# Patient Record
Sex: MALE | Race: BLACK OR AFRICAN AMERICAN | Employment: UNEMPLOYED | URBAN - METROPOLITAN AREA
[De-identification: names, ages, dates, MRNs, and addresses within clinical notes are randomized per-mention and may not be internally consistent; named-entity substitution may affect disease eponyms.]

---

## 2019-09-16 ENCOUNTER — OP HISTORICAL/CONVERTED ENCOUNTER (OUTPATIENT)
Dept: OTHER | Age: 64
End: 2019-09-16

## 2019-09-30 ENCOUNTER — OP HISTORICAL/CONVERTED ENCOUNTER (OUTPATIENT)
Dept: OTHER | Age: 64
End: 2019-09-30

## 2019-10-07 ENCOUNTER — OP HISTORICAL/CONVERTED ENCOUNTER (OUTPATIENT)
Dept: OTHER | Age: 64
End: 2019-10-07

## 2020-02-27 ENCOUNTER — IP HISTORICAL/CONVERTED ENCOUNTER (OUTPATIENT)
Dept: OTHER | Age: 65
End: 2020-02-27

## 2020-03-13 ENCOUNTER — OP HISTORICAL/CONVERTED ENCOUNTER (OUTPATIENT)
Dept: OTHER | Age: 65
End: 2020-03-13

## 2021-01-15 ENCOUNTER — APPOINTMENT (OUTPATIENT)
Dept: GENERAL RADIOLOGY | Age: 66
DRG: 177 | End: 2021-01-15
Attending: EMERGENCY MEDICINE
Payer: MEDICARE

## 2021-01-15 ENCOUNTER — HOSPITAL ENCOUNTER (INPATIENT)
Age: 66
LOS: 11 days | Discharge: HOME HEALTH | DRG: 177 | End: 2021-01-26
Attending: FAMILY MEDICINE | Admitting: FAMILY MEDICINE
Payer: MEDICARE

## 2021-01-15 DIAGNOSIS — J12.82 PNEUMONIA DUE TO COVID-19 VIRUS: ICD-10-CM

## 2021-01-15 DIAGNOSIS — U07.1 PNEUMONIA DUE TO COVID-19 VIRUS: ICD-10-CM

## 2021-01-15 DIAGNOSIS — R53.1 GENERALIZED WEAKNESS: ICD-10-CM

## 2021-01-15 DIAGNOSIS — J96.01 ACUTE RESPIRATORY FAILURE WITH HYPOXIA (HCC): Primary | ICD-10-CM

## 2021-01-15 PROBLEM — J18.9 BILATERAL PNEUMONIA: Status: ACTIVE | Noted: 2021-01-15

## 2021-01-15 LAB
ALBUMIN SERPL-MCNC: 2.8 G/DL (ref 3.5–5)
ALBUMIN/GLOB SERPL: 0.5 {RATIO} (ref 1.1–2.2)
ALP SERPL-CCNC: 61 U/L (ref 45–117)
ALT SERPL-CCNC: 27 U/L (ref 12–78)
ANION GAP SERPL CALC-SCNC: 14 MMOL/L (ref 5–15)
APTT PPP: 34.2 SEC (ref 23–35.7)
AST SERPL W P-5'-P-CCNC: 28 U/L (ref 15–37)
BASOPHILS # BLD: 0 K/UL (ref 0–0.1)
BASOPHILS NFR BLD: 0 % (ref 0–1)
BILIRUB SERPL-MCNC: 0.4 MG/DL (ref 0.2–1)
BNP SERPL-MCNC: 605 PG/ML
BUN SERPL-MCNC: 102 MG/DL (ref 6–20)
BUN/CREAT SERPL: 19 (ref 12–20)
CA-I BLD-MCNC: 8.8 MG/DL (ref 8.5–10.1)
CHLORIDE SERPL-SCNC: 98 MMOL/L (ref 97–108)
CO2 SERPL-SCNC: 20 MMOL/L (ref 21–32)
CREAT SERPL-MCNC: 5.37 MG/DL (ref 0.7–1.3)
CRP SERPL-MCNC: 13.5 MG/DL (ref 0–0.6)
D DIMER PPP FEU-MCNC: 2.69 UG/ML(FEU)
DIFFERENTIAL METHOD BLD: ABNORMAL
EOSINOPHIL # BLD: 0 K/UL (ref 0–0.4)
EOSINOPHIL NFR BLD: 0 % (ref 0–7)
ERYTHROCYTE [DISTWIDTH] IN BLOOD BY AUTOMATED COUNT: 15.2 % (ref 11.5–14.5)
GLOBULIN SER CALC-MCNC: 5.2 G/DL (ref 2–4)
GLUCOSE SERPL-MCNC: 99 MG/DL (ref 65–100)
HCT VFR BLD AUTO: 31.1 % (ref 36.6–50.3)
HGB BLD-MCNC: 10.5 G/DL (ref 12.1–17)
IMM GRANULOCYTES # BLD AUTO: 0 K/UL (ref 0–0.04)
IMM GRANULOCYTES NFR BLD AUTO: 0 % (ref 0–0.5)
INR PPP: 1.3 (ref 0.9–1.1)
LACTATE SERPL-SCNC: 1.2 MMOL/L (ref 0.4–2)
LDH SERPL L TO P-CCNC: 367 U/L (ref 85–241)
LYMPHOCYTES # BLD: 0.4 K/UL (ref 0.8–3.5)
LYMPHOCYTES NFR BLD: 6 % (ref 12–49)
MAGNESIUM SERPL-MCNC: 2.8 MG/DL (ref 1.6–2.4)
MCH RBC QN AUTO: 26.3 PG (ref 26–34)
MCHC RBC AUTO-ENTMCNC: 33.8 G/DL (ref 30–36.5)
MCV RBC AUTO: 77.8 FL (ref 80–99)
MONOCYTES # BLD: 0.2 K/UL (ref 0–1)
MONOCYTES NFR BLD: 3 % (ref 5–13)
NEUTS SEG # BLD: 5.9 K/UL (ref 1.8–8)
NEUTS SEG NFR BLD: 91 % (ref 32–75)
PLATELET # BLD AUTO: 281 K/UL (ref 150–400)
PMV BLD AUTO: 10.1 FL (ref 8.9–12.9)
POTASSIUM SERPL-SCNC: 3.8 MMOL/L (ref 3.5–5.1)
PROCALCITONIN SERPL-MCNC: 0.28 NG/ML
PROT SERPL-MCNC: 8 G/DL (ref 6.4–8.2)
PROTHROMBIN TIME: 15.7 SEC (ref 11.9–14.7)
RBC # BLD AUTO: 4 M/UL (ref 4.1–5.7)
SODIUM SERPL-SCNC: 132 MMOL/L (ref 136–145)
THERAPEUTIC RANGE,PTTT: NORMAL SEC (ref 68–109)
TROPONIN I SERPL-MCNC: <0.05 NG/ML
TSH SERPL DL<=0.05 MIU/L-ACNC: 1.08 UIU/ML (ref 0.36–3.74)
WBC # BLD AUTO: 6.5 K/UL (ref 4.1–11.1)

## 2021-01-15 PROCEDURE — 84145 PROCALCITONIN (PCT): CPT

## 2021-01-15 PROCEDURE — 85025 COMPLETE CBC W/AUTO DIFF WBC: CPT

## 2021-01-15 PROCEDURE — 96374 THER/PROPH/DIAG INJ IV PUSH: CPT

## 2021-01-15 PROCEDURE — 74011000250 HC RX REV CODE- 250: Performed by: FAMILY MEDICINE

## 2021-01-15 PROCEDURE — 36415 COLL VENOUS BLD VENIPUNCTURE: CPT

## 2021-01-15 PROCEDURE — 83615 LACTATE (LD) (LDH) ENZYME: CPT

## 2021-01-15 PROCEDURE — 83735 ASSAY OF MAGNESIUM: CPT

## 2021-01-15 PROCEDURE — 84484 ASSAY OF TROPONIN QUANT: CPT

## 2021-01-15 PROCEDURE — 82728 ASSAY OF FERRITIN: CPT

## 2021-01-15 PROCEDURE — 86140 C-REACTIVE PROTEIN: CPT

## 2021-01-15 PROCEDURE — 83605 ASSAY OF LACTIC ACID: CPT

## 2021-01-15 PROCEDURE — 65270000029 HC RM PRIVATE

## 2021-01-15 PROCEDURE — 83880 ASSAY OF NATRIURETIC PEPTIDE: CPT

## 2021-01-15 PROCEDURE — 93005 ELECTROCARDIOGRAM TRACING: CPT

## 2021-01-15 PROCEDURE — 85610 PROTHROMBIN TIME: CPT

## 2021-01-15 PROCEDURE — 84443 ASSAY THYROID STIM HORMONE: CPT

## 2021-01-15 PROCEDURE — 74011250636 HC RX REV CODE- 250/636: Performed by: FAMILY MEDICINE

## 2021-01-15 PROCEDURE — 87040 BLOOD CULTURE FOR BACTERIA: CPT

## 2021-01-15 PROCEDURE — 80053 COMPREHEN METABOLIC PANEL: CPT

## 2021-01-15 PROCEDURE — 99285 EMERGENCY DEPT VISIT HI MDM: CPT

## 2021-01-15 PROCEDURE — 71045 X-RAY EXAM CHEST 1 VIEW: CPT

## 2021-01-15 PROCEDURE — 85379 FIBRIN DEGRADATION QUANT: CPT

## 2021-01-15 PROCEDURE — 85730 THROMBOPLASTIN TIME PARTIAL: CPT

## 2021-01-15 PROCEDURE — 74011250636 HC RX REV CODE- 250/636: Performed by: PHYSICIAN ASSISTANT

## 2021-01-15 RX ORDER — ALBUTEROL SULFATE 90 UG/1
2 AEROSOL, METERED RESPIRATORY (INHALATION) ONCE
Status: DISPENSED | OUTPATIENT
Start: 2021-01-15 | End: 2021-01-16

## 2021-01-15 RX ORDER — DEXAMETHASONE SODIUM PHOSPHATE 4 MG/ML
6 INJECTION, SOLUTION INTRA-ARTICULAR; INTRALESIONAL; INTRAMUSCULAR; INTRAVENOUS; SOFT TISSUE ONCE
Status: COMPLETED | OUTPATIENT
Start: 2021-01-16 | End: 2021-01-16

## 2021-01-15 RX ORDER — MAGNESIUM SULFATE 100 %
4 CRYSTALS MISCELLANEOUS AS NEEDED
Status: DISCONTINUED | OUTPATIENT
Start: 2021-01-15 | End: 2021-01-26 | Stop reason: HOSPADM

## 2021-01-15 RX ORDER — SODIUM CHLORIDE 0.9 % (FLUSH) 0.9 %
5-40 SYRINGE (ML) INJECTION AS NEEDED
Status: DISCONTINUED | OUTPATIENT
Start: 2021-01-15 | End: 2021-01-21

## 2021-01-15 RX ORDER — DEXTROSE 50 % IN WATER (D50W) INTRAVENOUS SYRINGE
25-50 AS NEEDED
Status: DISCONTINUED | OUTPATIENT
Start: 2021-01-15 | End: 2021-01-26 | Stop reason: HOSPADM

## 2021-01-15 RX ORDER — DEXAMETHASONE SODIUM PHOSPHATE 10 MG/ML
6 INJECTION INTRAMUSCULAR; INTRAVENOUS ONCE
Status: COMPLETED | OUTPATIENT
Start: 2021-01-15 | End: 2021-01-15

## 2021-01-15 RX ORDER — HYDRALAZINE HYDROCHLORIDE 25 MG/1
25 TABLET, FILM COATED ORAL
Status: ACTIVE | OUTPATIENT
Start: 2021-01-15 | End: 2021-01-16

## 2021-01-15 RX ORDER — SODIUM CHLORIDE 0.9 % (FLUSH) 0.9 %
5-40 SYRINGE (ML) INJECTION EVERY 8 HOURS
Status: DISCONTINUED | OUTPATIENT
Start: 2021-01-15 | End: 2021-01-21

## 2021-01-15 RX ORDER — ONDANSETRON 2 MG/ML
4 INJECTION INTRAMUSCULAR; INTRAVENOUS
Status: DISCONTINUED | OUTPATIENT
Start: 2021-01-15 | End: 2021-01-21

## 2021-01-15 RX ORDER — ACETAMINOPHEN 650 MG/1
650 SUPPOSITORY RECTAL
Status: DISCONTINUED | OUTPATIENT
Start: 2021-01-15 | End: 2021-01-26 | Stop reason: HOSPADM

## 2021-01-15 RX ORDER — POLYETHYLENE GLYCOL 3350 17 G/17G
17 POWDER, FOR SOLUTION ORAL DAILY PRN
Status: DISCONTINUED | OUTPATIENT
Start: 2021-01-15 | End: 2021-01-26 | Stop reason: HOSPADM

## 2021-01-15 RX ORDER — HEPARIN SODIUM 5000 [USP'U]/ML
5000 INJECTION, SOLUTION INTRAVENOUS; SUBCUTANEOUS EVERY 8 HOURS
Status: DISCONTINUED | OUTPATIENT
Start: 2021-01-15 | End: 2021-01-26 | Stop reason: HOSPADM

## 2021-01-15 RX ORDER — ACETAMINOPHEN 325 MG/1
650 TABLET ORAL
Status: DISCONTINUED | OUTPATIENT
Start: 2021-01-15 | End: 2021-01-26 | Stop reason: HOSPADM

## 2021-01-15 RX ORDER — INSULIN LISPRO 100 [IU]/ML
INJECTION, SOLUTION INTRAVENOUS; SUBCUTANEOUS
Status: DISCONTINUED | OUTPATIENT
Start: 2021-01-16 | End: 2021-01-26 | Stop reason: HOSPADM

## 2021-01-15 RX ORDER — PROMETHAZINE HYDROCHLORIDE 25 MG/1
12.5 TABLET ORAL
Status: DISCONTINUED | OUTPATIENT
Start: 2021-01-15 | End: 2021-01-26 | Stop reason: HOSPADM

## 2021-01-15 RX ADMIN — AZITHROMYCIN DIHYDRATE 500 MG: 500 INJECTION, POWDER, LYOPHILIZED, FOR SOLUTION INTRAVENOUS at 21:27

## 2021-01-15 RX ADMIN — SODIUM CHLORIDE 1000 ML: 9 INJECTION, SOLUTION INTRAVENOUS at 18:30

## 2021-01-15 RX ADMIN — CEFTRIAXONE SODIUM 1 G: 1 INJECTION, POWDER, FOR SOLUTION INTRAMUSCULAR; INTRAVENOUS at 21:27

## 2021-01-15 RX ADMIN — HEPARIN SODIUM 5000 UNITS: 5000 INJECTION INTRAVENOUS; SUBCUTANEOUS at 21:27

## 2021-01-15 RX ADMIN — DEXAMETHASONE SODIUM PHOSPHATE 6 MG: 10 INJECTION, SOLUTION INTRAMUSCULAR; INTRAVENOUS at 18:30

## 2021-01-15 NOTE — ED NOTES
Pts niece would like to be contacted with updates. Patient Brent pace with this. [phone] Mike Reynoso.

## 2021-01-15 NOTE — ED TRIAGE NOTES
[facility] pt went to [facility] c/o cough for a week and weakness for 2-3 day, a rapid COVID was done and pt tested positive; upon EMS arrival pt's o2 sats were 92 on RA, after placing pt o 2 l/min pt;s O2 sats went up to 98%

## 2021-01-15 NOTE — ED PROVIDER NOTES
EMERGENCY DEPARTMENT HISTORY AND PHYSICAL EXAM      Date: 1/15/2021  Patient Name: Wong Chilel    History of Presenting Illness     Chief Complaint   Patient presents with    Positive For Covid-19       History Provided By: Patient and EMS    HPI: Wong Chilel, 77 y.o. male with a past medical history significant hypertension and CKD4, prostate cancer presents to the ED with cc of shortness of breath and generalized weakness, worsening over the last week. Patient tested positive for COVID-19 a few days ago. He has not been eating or drinking well over the last few days. Patient is not a cigarette smoker, no history of COPD or asthma. He does not wear oxygen at home. Upon EMS arrival, patient was hypoxic at 92%. Oxygen saturation increased to 98% after 2 L via nasal cannula. Current associated symptoms include mild diarrhea. Patient specifically denies chest pain, abdominal pain, nausea, vomiting, recent travel, leg swelling, syncope, palpitations, wheezing. There are no other complaints, changes, or physical findings at this time.     PCP: Wei Palmer,     Current Facility-Administered Medications   Medication Dose Route Frequency Provider Last Rate Last Admin    albuterol (PROVENTIL HFA, VENTOLIN HFA, PROAIR HFA) inhaler 2 Puff  2 Puff Inhalation ONCE Pita Mckoy PA-C   Stopped at 01/15/21 2044    sodium chloride (NS) flush 5-40 mL  5-40 mL IntraVENous Q8H Benjamín Sarabia MD        sodium chloride (NS) flush 5-40 mL  5-40 mL IntraVENous PRN Benjamín Sarabia MD        acetaminophen (TYLENOL) tablet 650 mg  650 mg Oral Q6H PRN Bnejamín Sarabia MD        Or    acetaminophen (TYLENOL) suppository 650 mg  650 mg Rectal Q6H PRN Benjamín Sarabia MD        polyethylene glycol (MIRALAX) packet 17 g  17 g Oral DAILY PRN Benjamín Sarabia MD        promethazine (PHENERGAN) tablet 12.5 mg  12.5 mg Oral Q6H PRN Benjamín Sarabia MD        Or    ondansetron [facility]) injection 4 mg  4 mg IntraVENous Q6H PRN Jolanta Dyer MD        heparin (porcine) injection 5,000 Units  5,000 Units SubCUTAneous Q8H Jolanta Dyer MD   5,000 Units at 01/15/21 2127    azithromycin (ZITHROMAX) 500 mg in 0.9% sodium chloride 250 mL (VIAL-MATE)  500 mg IntraVENous Q24H Jolanta Dyer MD   500 mg at 01/15/21 2127    cefTRIAXone (ROCEPHIN) 1 g in sterile water (preservative free) 10 mL IV syringe  1 g IntraVENous Q24H Jolanta Dyer MD   1 g at 01/15/21 2127       Past History     Past Medical History:  Past Medical History:   Diagnosis Date    CKD (chronic kidney disease) stage 4, GFR 15-29 ml/min ([facility])     DM (diabetes mellitus) [facility] [address]     HTN (hypertension)        Past Surgical History:  History reviewed. No pertinent surgical history. Family History:  Family History   Problem Relation Age of Onset    Coronary Artery Disease Mother     Heart Failure Mother     Pacemaker Sister        Social History:  Social History     Tobacco Use    Smoking status: Never Smoker    Smokeless tobacco: Never Used   Substance Use Topics    Alcohol use: Not Currently    Drug use: Not Currently       Allergies:  No Known Allergies      Review of Systems   Review of Systems   Constitutional: Positive for activity change, appetite change and fatigue. Negative for chills and fever. HENT: Negative for congestion, ear pain, rhinorrhea and trouble swallowing. Eyes: Negative for pain and visual disturbance. Respiratory: Positive for cough and shortness of breath. Negative for wheezing. Cardiovascular: Negative for chest pain. Gastrointestinal: Positive for diarrhea. Negative for abdominal pain, nausea and vomiting. Genitourinary: Negative for decreased urine volume, difficulty urinating, dysuria and hematuria. Musculoskeletal: Negative for arthralgias and myalgias. Skin: Negative for rash. Neurological: Positive for weakness. Negative for headaches. Hematological: Negative for adenopathy. Psychiatric/Behavioral: The patient is not nervous/anxious. All other systems reviewed and are negative. Physical Exam   Physical Exam  Vitals signs and nursing note reviewed. Constitutional:       General: He is not in acute distress. Appearance: He is well-developed. He is ill-appearing. HENT:      Head: Normocephalic and atraumatic. Mouth/Throat:      Mouth: Mucous membranes are moist.   Eyes:      Extraocular Movements: Extraocular movements intact. Pupils: Pupils are equal, round, and reactive to light. Cardiovascular:      Rate and Rhythm: Normal rate and regular rhythm. Pulses: Normal pulses. Heart sounds: Normal heart sounds. Pulmonary:      Effort: Pulmonary effort is normal. No tachypnea or respiratory distress. Breath sounds: Decreased breath sounds present. No wheezing, rhonchi or rales. Abdominal:      General: Abdomen is flat. Bowel sounds are normal.      Palpations: Abdomen is soft. Tenderness: There is no abdominal tenderness. Musculoskeletal:      Right lower leg: No edema. Left lower leg: No edema. Skin:     General: Skin is warm and dry. Capillary Refill: Capillary refill takes less than 2 seconds. Findings: No rash. Neurological:      General: No focal deficit present. Mental Status: He is alert and oriented to person, place, and time. Cranial Nerves: No cranial nerve deficit. Motor: Motor function is intact.    Psychiatric:         Mood and Affect: Mood normal.         Behavior: Behavior normal.         Diagnostic Study Results     Labs -     Recent Results (from the past 48 hour(s))   CBC WITH AUTOMATED DIFF    Collection Time: 01/15/21  5:15 PM   Result Value Ref Range    WBC 6.5 4.1 - 11.1 K/uL    RBC 4.00 (L) 4.10 - 5.70 M/uL    HGB 10.5 (L) 12.1 - 17.0 g/dL    HCT 31.1 (L) 36.6 - 50.3 %    MCV 77.8 (L) 80.0 - 99.0 FL    MCH 26.3 26.0 - 34.0 PG    MCHC 33.8 30.0 - 36.5 g/dL    RDW 15.2 (H) 11.5 - 14.5 % PLATELET 218 896 - 044 K/uL    MPV 10.1 8.9 - 12.9 FL    NEUTROPHILS 91 (H) 32 - 75 %    LYMPHOCYTES 6 (L) 12 - 49 %    MONOCYTES 3 (L) 5 - 13 %    EOSINOPHILS 0 0 - 7 %    BASOPHILS 0 0 - 1 %    IMMATURE GRANULOCYTES 0 0.0 - 0.5 %    ABS. NEUTROPHILS 5.9 1.8 - 8.0 K/UL    ABS. LYMPHOCYTES 0.4 (L) 0.8 - 3.5 K/UL    ABS. MONOCYTES 0.2 0.0 - 1.0 K/UL    ABS. EOSINOPHILS 0.0 0.0 - 0.4 K/UL    ABS. BASOPHILS 0.0 0.0 - 0.1 K/UL    ABS. IMM. GRANS. 0.0 0.00 - 0.04 K/UL    DF AUTOMATED     METABOLIC PANEL, COMPREHENSIVE    Collection Time: 01/15/21  5:15 PM   Result Value Ref Range    Sodium 132 (L) 136 - 145 mmol/L    Potassium 3.8 3.5 - 5.1 mmol/L    Chloride 98 97 - 108 mmol/L    CO2 20 (L) 21 - 32 mmol/L    Anion gap 14 5 - 15 mmol/L    Glucose 99 65 - 100 mg/dL     (H) 6 - 20 mg/dL    Creatinine 5.37 (H) 0.70 - 1.30 mg/dL    BUN/Creatinine ratio 19 12 - 20      GFR est AA 13 (L) >60 ml/min/1.73m2    GFR est non-AA 11 (L) >60 ml/min/1.73m2    Calcium 8.8 8.5 - 10.1 mg/dL    Bilirubin, total 0.4 0.2 - 1.0 mg/dL    AST (SGOT) 28 15 - 37 U/L    ALT (SGPT) 27 12 - 78 U/L    Alk.  phosphatase 61 45 - 117 U/L    Protein, total 8.0 6.4 - 8.2 g/dL    Albumin 2.8 (L) 3.5 - 5.0 g/dL    Globulin 5.2 (H) 2.0 - 4.0 g/dL    A-G Ratio 0.5 (L) 1.1 - 2.2     TROPONIN I    Collection Time: 01/15/21  5:30 PM   Result Value Ref Range    Troponin-I, Qt. <0.05 <0.05 ng/mL   BNP    Collection Time: 01/15/21  5:30 PM   Result Value Ref Range    NT pro- (H) <125 pg/mL   MAGNESIUM    Collection Time: 01/15/21  5:30 PM   Result Value Ref Range    Magnesium 2.8 (H) 1.6 - 2.4 mg/dL   TSH 3RD GENERATION    Collection Time: 01/15/21  5:30 PM   Result Value Ref Range    TSH 1.08 0.36 - 3.74 uIU/mL   LD    Collection Time: 01/15/21  5:30 PM   Result Value Ref Range     (H) 85 - 241 U/L   C REACTIVE PROTEIN, QT    Collection Time: 01/15/21  5:30 PM   Result Value Ref Range    C-Reactive protein 13.50 (H) 0.00 - 0.60 mg/dL   D DIMER    Collection Time: 01/15/21  5:30 PM   Result Value Ref Range    D DIMER 2.69 (H) <0.50 ug/ml(FEU)   LACTIC ACID    Collection Time: 01/15/21  5:30 PM   Result Value Ref Range    Lactic acid 1.2 0.4 - 2.0 mmol/L   PROCALCITONIN    Collection Time: 01/15/21  5:30 PM   Result Value Ref Range    Procalcitonin 0.28 (H) 0 ng/mL   PROTHROMBIN TIME + INR    Collection Time: 01/15/21  5:30 PM   Result Value Ref Range    Prothrombin time 15.7 (H) 11.9 - 14.7 sec    INR 1.3 (H) 0.9 - 1.1     PTT    Collection Time: 01/15/21  5:30 PM   Result Value Ref Range    aPTT 34.2 23.0 - 35.7 sec    aPTT, therapeutic range   68 - 109 sec       Radiologic Studies -   XR Results (most recent):  Results from Hospital Encounter encounter on 01/15/21   XR CHEST SNGL V    Narrative Chest, frontal view, 1/15/2021    History: Covid-19 positive. Comparison: Including chest 11/26/2015. Findings: There is surgical hardware in the cervical spine. The cardiac  silhouette is within normal limits. The lungs are underexpanded. There are  airspace opacities have been the lungs most pronounced in the perihilar regions  and bases compatible with Covid-19 pneumonia. Hydrostatic edema could also be  present. No pneumothorax is identified. Degenerative changes are present in  the thoracic spine. Impression Impression: Airspace opacities in the lungs compatible with Covid-19 pneumonia. CT Results  (Last 48 hours)    None            Medical Decision Making and ED Course   I am the first provider for this patient. I reviewed the vital signs, available nursing notes, past medical history, past surgical history, family history and social history. Vital Signs-Reviewed the patient's vital signs.   Patient Vitals for the past 12 hrs:   Temp Pulse Resp BP SpO2   01/15/21 2124     97 %   01/15/21 2123     (!) 87 %   01/15/21 2033  75 24 (!) 145/86 95 %   01/15/21 1758  83 27 136/88 96 %   01/15/21 1649 98.3 °F (36.8 °C) 85 16 (!) 148/89 98 %       EKG interpretation: (Preliminary)  Normal sinus rhythm at 78 bpm, no ST-T wave changes, read by Dr. Lila Crane at 2105    Records Reviewed: Nursing Notes, Old Medical Records, Previous Radiology Studies and Previous Laboratory Studies    Provider Notes (Medical Decision Making):       MDM  Number of Diagnoses or Management Options  Acute respiratory failure with hypoxia ([facility] [address]  Generalized weakness  Pneumonia due to COVID-19 virus  Diagnosis management comments: Differentials include COVID-19 pneumonia, acute respiratory failure secondary to COVID-19, pleural effusion, pulmonary edema, congestive heart failure, electrolyte disturbance, dehydration    This is a 78-year-old male with a history of diabetes and hypertension, recently tested positive for COVID-19. Presenting with hypoxia via EMS. Attempted to wean patient off of oxygen. He dropped down to 86% on room air without exertion. Will admit for oxygen therapy and further observation. Amount and/or Complexity of Data Reviewed  Clinical lab tests: ordered and reviewed  Tests in the radiology section of CPT®: ordered and reviewed          ED Course:   Initial assessment performed. The patients presenting problems have been discussed, and they are in agreement with the care plan formulated and outlined with them. I have encouraged them to ask questions as they arise throughout their visit. 9:11 PM  Progress Note:  Patient was re-evaluated at bedside. Saturating 86% on room air. Consult Note:  9:11 PM  Orlando Everett PA-C spoke with Dr. Tanvi Shafer  Specialty: Internal Medicine  Discussed pt's hx, disposition, and available diagnostic and imaging results. Reviewed care plans. Dr. Tanvi Shafer will evaluate the patient for admission. Admit Note:  9:11 PM  Pt is being admitted by Dr. Tanvi Shafer. The results of their tests and reason(s) for their admission have been discussed with pt and/or available family.  They convey agreement and understanding for the need to be admitted and for admission diagnosis. Procedures       Rashawn Lozada PA-C    Procedures       CRITICAL CARE NOTE :  6:22 PM  Amount of Critical Care Time: _30___(minutes)__    IMPENDING DETERIORATION -Airway and Respiratory  ASSOCIATED RISK FACTORS - Hypoxia  MANAGEMENT- Bedside Assessment and Supervision of Care  INTERPRETATION -  Xrays, ECG and Blood Pressure  INTERVENTIONS - Supplemental oxygen  CASE REVIEW - Hospitalist and Nursing  TREATMENT RESPONSE -Improved and Stable  PERFORMED BY - Mid-Level    NOTES   :  I have spent critical care time involved in lab review, consultations with specialist, family decision- making, bedside attention and documentation. This time excludes time spent in any separate billed procedures. During this entire length of time I was immediately available to the patient . Rashawn Lozada PA-C        Disposition     Admitted         Diagnosis     Clinical Impression:   1. Acute respiratory failure with hypoxia ([address]    2. Pneumonia due to COVID-19 virus    3. Generalized weakness        Attestations:    Rashawn Lozada PA-C    Please note that this dictation was completed with [employer], the computer voice recognition software. Quite often unanticipated grammatical, syntax, homophones, and other interpretive errors are inadvertently transcribed by the computer software. Please disregard these errors. Please excuse any errors that have escaped final proofreading. Thank you.

## 2021-01-16 LAB
ANION GAP SERPL CALC-SCNC: 14 MMOL/L (ref 5–15)
BUN SERPL-MCNC: 101 MG/DL (ref 6–20)
BUN/CREAT SERPL: 20 (ref 12–20)
CA-I BLD-MCNC: 8.3 MG/DL (ref 8.5–10.1)
CHLORIDE SERPL-SCNC: 102 MMOL/L (ref 97–108)
CO2 SERPL-SCNC: 18 MMOL/L (ref 21–32)
COVID-19 RAPID TEST, COVR: DETECTED
CREAT SERPL-MCNC: 5 MG/DL (ref 0.7–1.3)
ERYTHROCYTE [DISTWIDTH] IN BLOOD BY AUTOMATED COUNT: 15.3 % (ref 11.5–14.5)
FERRITIN SERPL-MCNC: 350 NG/ML (ref 26–388)
GLUCOSE BLD STRIP.AUTO-MCNC: 145 MG/DL (ref 65–100)
GLUCOSE BLD STRIP.AUTO-MCNC: 149 MG/DL (ref 65–100)
GLUCOSE BLD STRIP.AUTO-MCNC: 177 MG/DL (ref 65–100)
GLUCOSE SERPL-MCNC: 157 MG/DL (ref 65–100)
HCT VFR BLD AUTO: 27.3 % (ref 36.6–50.3)
HGB BLD-MCNC: 9.8 G/DL (ref 12.1–17)
MAGNESIUM SERPL-MCNC: 2.8 MG/DL (ref 1.6–2.4)
MCH RBC QN AUTO: 28 PG (ref 26–34)
MCHC RBC AUTO-ENTMCNC: 35.9 G/DL (ref 30–36.5)
MCV RBC AUTO: 78 FL (ref 80–99)
PERFORMED BY, TECHID: ABNORMAL
PLATELET # BLD AUTO: 264 K/UL (ref 150–400)
PMV BLD AUTO: 10.2 FL (ref 8.9–12.9)
POTASSIUM SERPL-SCNC: 3.3 MMOL/L (ref 3.5–5.1)
RBC # BLD AUTO: 3.5 M/UL (ref 4.1–5.7)
SARS-COV-2, COV2: NORMAL
SODIUM SERPL-SCNC: 134 MMOL/L (ref 136–145)
SPECIMEN SOURCE: ABNORMAL
WBC # BLD AUTO: 5.2 K/UL (ref 4.1–11.1)

## 2021-01-16 PROCEDURE — 94760 N-INVAS EAR/PLS OXIMETRY 1: CPT

## 2021-01-16 PROCEDURE — 80048 BASIC METABOLIC PNL TOTAL CA: CPT

## 2021-01-16 PROCEDURE — 74011250636 HC RX REV CODE- 250/636: Performed by: PHYSICIAN ASSISTANT

## 2021-01-16 PROCEDURE — 65270000029 HC RM PRIVATE

## 2021-01-16 PROCEDURE — 74011250637 HC RX REV CODE- 250/637: Performed by: PHYSICIAN ASSISTANT

## 2021-01-16 PROCEDURE — 87635 SARS-COV-2 COVID-19 AMP PRB: CPT

## 2021-01-16 PROCEDURE — 82962 GLUCOSE BLOOD TEST: CPT

## 2021-01-16 PROCEDURE — 74011000250 HC RX REV CODE- 250: Performed by: FAMILY MEDICINE

## 2021-01-16 PROCEDURE — 83735 ASSAY OF MAGNESIUM: CPT

## 2021-01-16 PROCEDURE — 36415 COLL VENOUS BLD VENIPUNCTURE: CPT

## 2021-01-16 PROCEDURE — 85027 COMPLETE CBC AUTOMATED: CPT

## 2021-01-16 PROCEDURE — 74011250636 HC RX REV CODE- 250/636: Performed by: FAMILY MEDICINE

## 2021-01-16 RX ORDER — PANTOPRAZOLE SODIUM 40 MG/1
40 TABLET, DELAYED RELEASE ORAL
Status: DISCONTINUED | OUTPATIENT
Start: 2021-01-16 | End: 2021-01-16

## 2021-01-16 RX ORDER — LEVOTHYROXINE SODIUM 25 UG/1
50 TABLET ORAL
Status: DISCONTINUED | OUTPATIENT
Start: 2021-01-17 | End: 2021-01-26 | Stop reason: HOSPADM

## 2021-01-16 RX ORDER — IVERMECTIN 3 MG/1
12 TABLET ORAL ONCE
Status: DISPENSED | OUTPATIENT
Start: 2021-01-16 | End: 2021-01-16

## 2021-01-16 RX ORDER — POTASSIUM CHLORIDE 750 MG/1
40 TABLET, FILM COATED, EXTENDED RELEASE ORAL ONCE
Status: COMPLETED | OUTPATIENT
Start: 2021-01-16 | End: 2021-01-16

## 2021-01-16 RX ORDER — ZINC SULFATE 50(220)MG
1 CAPSULE ORAL DAILY
Status: DISCONTINUED | OUTPATIENT
Start: 2021-01-16 | End: 2021-01-24

## 2021-01-16 RX ORDER — CALCITRIOL 0.25 UG/1
0.25 CAPSULE ORAL DAILY
COMMUNITY

## 2021-01-16 RX ORDER — HYDRALAZINE HYDROCHLORIDE 50 MG/1
100 TABLET, FILM COATED ORAL 3 TIMES DAILY
COMMUNITY

## 2021-01-16 RX ORDER — FAMOTIDINE 20 MG/1
20 TABLET, FILM COATED ORAL 2 TIMES DAILY
COMMUNITY

## 2021-01-16 RX ORDER — ALLOPURINOL 100 MG/1
100 TABLET ORAL DAILY
Status: DISCONTINUED | OUTPATIENT
Start: 2021-01-16 | End: 2021-01-26 | Stop reason: HOSPADM

## 2021-01-16 RX ORDER — FUROSEMIDE 40 MG/1
40 TABLET ORAL DAILY
COMMUNITY
End: 2021-01-26

## 2021-01-16 RX ORDER — CALCITRIOL 0.25 UG/1
0.25 CAPSULE ORAL DAILY
Status: DISCONTINUED | OUTPATIENT
Start: 2021-01-16 | End: 2021-01-26 | Stop reason: HOSPADM

## 2021-01-16 RX ORDER — ASCORBIC ACID 500 MG
500 TABLET ORAL DAILY
Status: DISCONTINUED | OUTPATIENT
Start: 2021-01-16 | End: 2021-01-24

## 2021-01-16 RX ORDER — LEVOTHYROXINE SODIUM 50 UG/1
50 TABLET ORAL
COMMUNITY

## 2021-01-16 RX ORDER — FAMOTIDINE 20 MG/1
20 TABLET, FILM COATED ORAL 2 TIMES DAILY
Status: DISCONTINUED | OUTPATIENT
Start: 2021-01-16 | End: 2021-01-26 | Stop reason: HOSPADM

## 2021-01-16 RX ORDER — CHOLECALCIFEROL TAB 125 MCG (5000 UNIT) 125 MCG
5000 TAB ORAL DAILY
Status: DISCONTINUED | OUTPATIENT
Start: 2021-01-16 | End: 2021-01-26 | Stop reason: HOSPADM

## 2021-01-16 RX ORDER — DEXAMETHASONE SODIUM PHOSPHATE 4 MG/ML
4 INJECTION, SOLUTION INTRA-ARTICULAR; INTRALESIONAL; INTRAMUSCULAR; INTRAVENOUS; SOFT TISSUE EVERY 8 HOURS
Status: DISCONTINUED | OUTPATIENT
Start: 2021-01-16 | End: 2021-01-20

## 2021-01-16 RX ORDER — AMLODIPINE BESYLATE 5 MG/1
10 TABLET ORAL DAILY
Status: DISCONTINUED | OUTPATIENT
Start: 2021-01-16 | End: 2021-01-26 | Stop reason: HOSPADM

## 2021-01-16 RX ORDER — ALLOPURINOL 100 MG/1
100 TABLET ORAL DAILY
COMMUNITY

## 2021-01-16 RX ORDER — HYDRALAZINE HYDROCHLORIDE 50 MG/1
100 TABLET, FILM COATED ORAL 3 TIMES DAILY
Status: DISCONTINUED | OUTPATIENT
Start: 2021-01-16 | End: 2021-01-26 | Stop reason: HOSPADM

## 2021-01-16 RX ORDER — ATENOLOL 100 MG/1
50 TABLET ORAL DAILY
COMMUNITY

## 2021-01-16 RX ORDER — AMLODIPINE BESYLATE 5 MG/1
5 TABLET ORAL DAILY
Status: DISCONTINUED | OUTPATIENT
Start: 2021-01-16 | End: 2021-01-16

## 2021-01-16 RX ORDER — ATENOLOL 50 MG/1
50 TABLET ORAL DAILY
Status: DISCONTINUED | OUTPATIENT
Start: 2021-01-16 | End: 2021-01-24

## 2021-01-16 RX ORDER — AMLODIPINE BESYLATE 10 MG/1
10 TABLET ORAL DAILY
COMMUNITY

## 2021-01-16 RX ADMIN — Medication 1 CAPSULE: at 09:02

## 2021-01-16 RX ADMIN — PANTOPRAZOLE SODIUM 40 MG: 40 TABLET, DELAYED RELEASE ORAL at 09:02

## 2021-01-16 RX ADMIN — FAMOTIDINE 20 MG: 20 TABLET, FILM COATED ORAL at 20:33

## 2021-01-16 RX ADMIN — ALLOPURINOL 100 MG: 100 TABLET ORAL at 11:27

## 2021-01-16 RX ADMIN — CHOLECALCIFEROL TAB 125 MCG (5000 UNIT) 5000 UNITS: 125 TAB at 09:02

## 2021-01-16 RX ADMIN — CEFTRIAXONE SODIUM 1 G: 1 INJECTION, POWDER, FOR SOLUTION INTRAMUSCULAR; INTRAVENOUS at 20:32

## 2021-01-16 RX ADMIN — POTASSIUM CHLORIDE 40 MEQ: 750 TABLET, FILM COATED, EXTENDED RELEASE ORAL at 09:02

## 2021-01-16 RX ADMIN — OXYCODONE HYDROCHLORIDE AND ACETAMINOPHEN 500 MG: 500 TABLET ORAL at 09:02

## 2021-01-16 RX ADMIN — HYDRALAZINE HYDROCHLORIDE 100 MG: 50 TABLET, FILM COATED ORAL at 20:33

## 2021-01-16 RX ADMIN — FAMOTIDINE 20 MG: 20 TABLET, FILM COATED ORAL at 11:26

## 2021-01-16 RX ADMIN — DEXAMETHASONE SODIUM PHOSPHATE 4 MG: 4 INJECTION, SOLUTION INTRA-ARTICULAR; INTRALESIONAL; INTRAMUSCULAR; INTRAVENOUS; SOFT TISSUE at 20:33

## 2021-01-16 RX ADMIN — DEXAMETHASONE SODIUM PHOSPHATE 4 MG: 4 INJECTION, SOLUTION INTRA-ARTICULAR; INTRALESIONAL; INTRAMUSCULAR; INTRAVENOUS; SOFT TISSUE at 16:20

## 2021-01-16 RX ADMIN — HEPARIN SODIUM 5000 UNITS: 5000 INJECTION INTRAVENOUS; SUBCUTANEOUS at 20:33

## 2021-01-16 RX ADMIN — HEPARIN SODIUM 5000 UNITS: 5000 INJECTION INTRAVENOUS; SUBCUTANEOUS at 16:20

## 2021-01-16 RX ADMIN — Medication 10 ML: at 20:57

## 2021-01-16 RX ADMIN — DEXAMETHASONE SODIUM PHOSPHATE 6 MG: 4 INJECTION, SOLUTION INTRA-ARTICULAR; INTRALESIONAL; INTRAMUSCULAR; INTRAVENOUS; SOFT TISSUE at 09:04

## 2021-01-16 RX ADMIN — AZITHROMYCIN DIHYDRATE 500 MG: 500 INJECTION, POWDER, LYOPHILIZED, FOR SOLUTION INTRAVENOUS at 20:32

## 2021-01-16 RX ADMIN — AMLODIPINE BESYLATE 5 MG: 5 TABLET ORAL at 09:02

## 2021-01-16 NOTE — CONSULTS
PULMONARY CONSULT  [facility]    Name: Mable Anaya MRN: [id number]   : 1955 Hospital: [address] [place] [address]   Date: 2021  Admission date: 1/15/2021 Hospital Day: 2       HPI:     Hospital Problems  Date Reviewed: 1/15/2021          Codes Class Noted POA    Bilateral pneumonia ICD-10-CM: J18.9  ICD-9-CM: 558  1/15/2021 Yes        Acute respiratory failure with hypoxia [facility] ICD-10-CM: J96.01  ICD-9-CM: 518.81  1/15/2021 Yes        COVID-19 ICD-10-CM: U07.1  ICD-9-CM: 079.89  1/15/2021 Yes                   [x] High complexity decision making was performed  [x] See my orders for details      Subjective/Initial History:     I was asked by Edsel Osler, MD to see Mable Anaya  a 77 y.o.  male in consultation     Excerpts from admission 1/15/2021 or consult notes as follows:   69-year-old male came in because of shortness of breath and dyspnea nonproductive cough patient was tested positive for COVID-19 several days ago then he started having worsening of his symptoms of shortness of breath dyspnea cough chest x-ray shows bilateral infiltrate consistent with COVID-19 he was hypoxic he was put on oxygen via nasal cannula 4 L so now pulmonary consult was called for further evaluation.       No Known Allergies     MAR reviewed and pertinent medications noted or modified as needed     Current Facility-Administered Medications   Medication    zinc sulfate (ZINCATE) 220 (50) mg capsule 1 Cap    ascorbic acid (vitamin C) (VITAMIN C) tablet 500 mg    cholecalciferol (VITAMIN D3) tablet 5,000 Units    dexamethasone (DECADRON) 4 mg/mL injection 4 mg    allopurinoL (ZYLOPRIM) tablet 100 mg    amLODIPine (NORVASC) tablet 10 mg    atenoloL (TENORMIN) tablet 50 mg    calcitRIOL (ROCALTROL) capsule 0.25 mcg    famotidine (PEPCID) tablet 20 mg    hydrALAZINE (APRESOLINE) tablet 100 mg    [START ON 2021] levothyroxine (SYNTHROID) tablet 50 mcg    ivermectin (STROMECTOL) tablet 6 mg    sodium chloride (NS) flush 5-40 mL    sodium chloride (NS) flush 5-40 mL    acetaminophen (TYLENOL) tablet 650 mg    Or    acetaminophen (TYLENOL) suppository 650 mg    polyethylene glycol (MIRALAX) packet 17 g    promethazine (PHENERGAN) tablet 12.5 mg    Or    ondansetron (ZOFRAN) injection 4 mg    heparin (porcine) injection 5,000 Units    azithromycin (ZITHROMAX) 500 mg in 0.9% sodium chloride 250 mL (VIAL-MATE)    cefTRIAXone (ROCEPHIN) 1 g in sterile water (preservative free) 10 mL IV syringe    glucose chewable tablet 16 g    dextrose (D50W) injection syrg 12.5-25 g    glucagon (GLUCAGEN) injection 1 mg    insulin lispro (HUMALOG) injection    hydrALAZINE (APRESOLINE) tablet 25 mg     Current Outpatient Medications   Medication Sig    atenoloL (TENORMIN) 100 mg tablet Take 50 mg by mouth daily.  hydrALAZINE (APRESOLINE) 50 mg tablet Take 100 mg by mouth three (3) times daily.  famotidine (Pepcid AC) 20 mg tablet Take 20 mg by mouth two (2) times a day.  levothyroxine (SYNTHROID) 50 mcg tablet Take 50 mcg by mouth Daily (before breakfast).  calcitRIOL (ROCALTROL) 0.25 mcg capsule Take 0.25 mcg by mouth daily.  allopurinoL (ZYLOPRIM) 100 mg tablet Take 100 mg by mouth daily. 1.5 tab    furosemide (Lasix) 40 mg tablet Take 40 mg by mouth daily.  amLODIPine (Norvasc) 10 mg tablet Take 10 mg by mouth daily. Patient PCP: Guillermina Sanderson DO  PMH:  has a past medical history of CKD (chronic kidney disease) stage 4, GFR 15-29 ml/min ([address] DM (diabetes mellitus) ([address] H/O cervical spine surgery, and HTN (hypertension). PSH:   has a past surgical history that includes hx orthopaedic. FHX: family history includes Coronary Artery Disease in his mother; Heart Failure in his mother; Pacemaker in his sister. SHX:  reports that he has never smoked. He has never used smokeless tobacco. He reports previous alcohol use. He reports previous drug use. ROS:    Review of Systems   Constitutional: Positive for malaise/fatigue. HENT: Negative. Eyes: Negative. Respiratory: Positive for cough and shortness of breath. Cardiovascular: Positive for orthopnea. Gastrointestinal: Negative. Genitourinary: Negative. Musculoskeletal: Negative. Neurological: Negative. Endo/Heme/Allergies: Negative. Psychiatric/Behavioral: Negative. Objective:     Vital Signs: Telemetry:    normal sinus rhythm Intake/Output:   Visit Vitals  BP (!) 140/74   Pulse (!) 57   Temp 98.3 °F (36.8 °C)   Resp 18   Ht 5' 7\" (1.702 m)   Wt 114.3 kg (252 lb)   SpO2 98%   BMI 39.47 kg/m²       Temp (24hrs), Av.3 °F (36.8 °C), Min:98.3 °F (36.8 °C), Max:98.3 °F (36.8 °C)        O2 Device: Nasal cannula O2 Flow Rate (L/min): 4 l/min       Wt Readings from Last 4 Encounters:   01/15/21 114.3 kg (252 lb)          Intake/Output Summary (Last 24 hours) at 2021 1133  Last data filed at 1/15/2021 1930  Gross per 24 hour   Intake 1000 ml   Output    Net 1000 ml       Last shift:      No intake/output data recorded. Last 3 shifts:  190 -  0700  In: 1000 [I.V.:1000]  Out: -        Physical Exam:     Physical Exam   Constitutional: He appears distressed. HENT:   Head: Normocephalic and atraumatic. Eyes: Pupils are equal, round, and reactive to light. Conjunctivae are normal.   Neck: Normal range of motion. Neck supple. Cardiovascular: Normal rate and regular rhythm. Pulmonary/Chest: He is in respiratory distress. Abdominal: Soft. Bowel sounds are normal.   Musculoskeletal:         General: Edema present. Neurological: He is alert.         Labs:    Recent Labs     21  0101 01/15/21  1730 01/15/21  1715   WBC 5.2  --  6.5   HGB 9.8*  --  10.5*     --  281   INR  --  1.3*  --    APTT  --  34.2  --      Recent Labs     21  0101 01/15/21  1730 01/15/21  1715   *  --  132*   K 3.3*  --  3.8     --  98   CO2 18*  --  20*   GLU 157*  --  99   *  --  102*   CREA 5.00*  --  5.37*   CA 8.3*  --  8.8   MG 2.8* 2.8*  --    LAC  --  1.2  --    ALB  --   --  2.8*   ALT  --   --  27     No results for input(s): PH, PCO2, PO2, HCO3, FIO2 in the last 72 hours. Recent Labs     01/15/21  1730   TROIQ <0.05     No results found for: BNPP, BNP   No results found for: CULT  Lab Results   Component Value Date/Time    TSH 1.08 01/15/2021 05:30 PM       Imaging:    CXR Results  (Last 48 hours)               01/15/21 1726  XR CHEST SNGL V Final result    Impression:  Impression: Airspace opacities in the lungs compatible with Covid-19 pneumonia. Narrative:  Chest, frontal view, 1/15/2021       History: Covid-19 positive. Comparison: Including chest 11/26/2015. Findings: There is surgical hardware in the cervical spine. The cardiac   silhouette is within normal limits. The lungs are underexpanded. There are   airspace opacities have been the lungs most pronounced in the perihilar regions   and bases compatible with Covid-19 pneumonia. Hydrostatic edema could also be   present. No pneumothorax is identified. Degenerative changes are present in   the thoracic spine. No results found for this or any previous visit. IMPRESSION:   1. Acute hypoxic respiratory failure  2. COVID-19 pneumonia  3. History of hypertension diabetes mellitus  Body mass index is 39.47 kg/m². 4. Acute on chronic kidney disease creatinine is 5 his GFR is 14 not a candidate for remdesivir  5. Will start patient on ivermectin 12 mg 1 dose  6. Pt is requiring Drug therapy requiring intensive monitoring for toxicity  7. Pt is unstable, unpredictable needing inpatient monitoring; is acutely ill and at high risk of sudden decline and decompensation with severe consequenses and continued end organ dysfunction and failure  8. Prognosis guarded       RECOMMENDATIONS/PLAN:     1.  Patient is on nasal Cannula oxygen as salvage oxygen delivery device to provide high concentration of oxygen to overcome refractory hypoxia;  2. Continue with Decadron Zithromax and Rocephin  3. Intubate and place on vent if NIV fails  4. Agree with Empiric IV antibiotics pending culture results   5. Follow culture results  6. Supplemental O2 to keep sats > 93%  7. Aspiration precautions  8. Labs to follow electrolytes, renal function and and blood counts  9. Glucose monitoring and SSI  10. Bronchial hygiene with respiratory therapy techniques, bronchodilators  11. DVT, SUP prophylaxis         This care involved high complexity medical decision making: I personally:  · Reviewed the flowsheet and previous days notes  · Reviewed and summarized records or history from previous days note or discussions with staff, family  · High Risk Drug therapy requiring intensive monitoring for toxicity: eg steroids, pressors, antibiotics  · Reviewed and/or ordered Clinical lab tests  · Reviewed images and/or ordered Radiology tests  · Reviewed the patients ECG / Telemetry  · Reviewed and/or adjusted NiPPV settings  · Called and arranged for Radiologic procedures or interventions  · performed or ordered Diagnostic endoscopies with identified risk factors.   · discussed my assessment/management with : Nursing, Hospitalist and Family for coordination of care       Time spent in ER 55 min        Yifan Rivera MD

## 2021-01-16 NOTE — CONSULTS
PULMONARY CONSULT  [facility]    Name: Ernestina Milton MRN: [id number]   : 1955 Hospital: [facility]   Date: 2021  Admission date: 1/15/2021 Hospital Day: 2       HPI:     Hospital Problems  Date Reviewed: 1/15/2021          Codes Class Noted POA    Bilateral pneumonia ICD-10-CM: J18.9  ICD-9-CM: 932  1/15/2021 Yes        Acute respiratory failure with hypoxia [facility] ICD-10-CM: J96.01  ICD-9-CM: 518.81  1/15/2021 Yes        COVID-19 ICD-10-CM: U07.1  ICD-9-CM: 079.89  1/15/2021 Yes                   [x] High complexity decision making was performed  [x] See my orders for details      Subjective/Initial History:     I was asked by Aida Lewis MD to see Ernestina Milton  a 77 y.o.  male in consultation     Excerpts from admission 1/15/2021 or consult notes as follows:   68-year-old male came in because of shortness of breath and dyspnea nonproductive cough patient was tested positive for COVID-19 several days ago then he started having worsening of his symptoms of shortness of breath dyspnea cough chest x-ray shows bilateral infiltrate consistent with COVID-19 he was hypoxic he was put on oxygen via nasal cannula 4 L so now pulmonary consult was called for further evaluation.       No Known Allergies     MAR reviewed and pertinent medications noted or modified as needed     Current Facility-Administered Medications   Medication    zinc sulfate (ZINCATE) 220 (50) mg capsule 1 Cap    ascorbic acid (vitamin C) (VITAMIN C) tablet 500 mg    cholecalciferol (VITAMIN D3) tablet 5,000 Units    dexamethasone (DECADRON) 4 mg/mL injection 4 mg    allopurinoL (ZYLOPRIM) tablet 100 mg    amLODIPine (NORVASC) tablet 10 mg    atenoloL (TENORMIN) tablet 50 mg    calcitRIOL (ROCALTROL) capsule 0.25 mcg    famotidine (PEPCID) tablet 20 mg    hydrALAZINE (APRESOLINE) tablet 100 mg    [START ON 2021] levothyroxine (SYNTHROID) tablet 50 mcg    sodium chloride (NS) flush 5-40 mL    sodium chloride (NS) flush 5-40 mL    acetaminophen (TYLENOL) tablet 650 mg    Or    acetaminophen (TYLENOL) suppository 650 mg    polyethylene glycol (MIRALAX) packet 17 g    promethazine (PHENERGAN) tablet 12.5 mg    Or    ondansetron (ZOFRAN) injection 4 mg    heparin (porcine) injection 5,000 Units    azithromycin (ZITHROMAX) 500 mg in 0.9% sodium chloride 250 mL (VIAL-MATE)    cefTRIAXone (ROCEPHIN) 1 g in sterile water (preservative free) 10 mL IV syringe    glucose chewable tablet 16 g    dextrose (D50W) injection syrg 12.5-25 g    glucagon (GLUCAGEN) injection 1 mg    insulin lispro (HUMALOG) injection    hydrALAZINE (APRESOLINE) tablet 25 mg     Current Outpatient Medications   Medication Sig    atenoloL (TENORMIN) 100 mg tablet Take 50 mg by mouth daily.  hydrALAZINE (APRESOLINE) 50 mg tablet Take 100 mg by mouth three (3) times daily.  famotidine (Pepcid AC) 20 mg tablet Take 20 mg by mouth two (2) times a day.  levothyroxine (SYNTHROID) 50 mcg tablet Take 50 mcg by mouth Daily (before breakfast).  calcitRIOL (ROCALTROL) 0.25 mcg capsule Take 0.25 mcg by mouth daily.  allopurinoL (ZYLOPRIM) 100 mg tablet Take 100 mg by mouth daily. 1.5 tab    furosemide (Lasix) 40 mg tablet Take 40 mg by mouth daily.  amLODIPine (Norvasc) 10 mg tablet Take 10 mg by mouth daily. Patient PCP: Jeremy Bull DO  PMH:  has a past medical history of CKD (chronic kidney disease) stage 4, GFR 15-29 ml/min ([facility] [address] DM (diabetes mellitus) ([facility] [address] H/O cervical spine surgery, and HTN (hypertension). PSH:   has a past surgical history that includes hx orthopaedic. FHX: family history includes Coronary Artery Disease in his mother; Heart Failure in his mother; Pacemaker in his sister. SHX:  reports that he has never smoked. He has never used smokeless tobacco. He reports previous alcohol use. He reports previous drug use.      ROS:    Review of Systems   Constitutional: Positive for malaise/fatigue. HENT: Negative. Eyes: Negative. Respiratory: Positive for cough and shortness of breath. Cardiovascular: Positive for orthopnea. Gastrointestinal: Negative. Genitourinary: Negative. Musculoskeletal: Negative. Neurological: Negative. Endo/Heme/Allergies: Negative. Psychiatric/Behavioral: Negative. Objective:     Vital Signs: Telemetry:    normal sinus rhythm Intake/Output:   Visit Vitals  BP (!) 140/74   Pulse (!) 57   Temp 98.3 °F (36.8 °C)   Resp 18   Ht 5' 7\" (1.702 m)   Wt 114.3 kg (252 lb)   SpO2 98%   BMI 39.47 kg/m²       Temp (24hrs), Av.3 °F (36.8 °C), Min:98.3 °F (36.8 °C), Max:98.3 °F (36.8 °C)        O2 Device: Nasal cannula O2 Flow Rate (L/min): 4 l/min       Wt Readings from Last 4 Encounters:   01/15/21 114.3 kg (252 lb)          Intake/Output Summary (Last 24 hours) at 2021 1123  Last data filed at 1/15/2021 1930  Gross per 24 hour   Intake 1000 ml   Output    Net 1000 ml       Last shift:      No intake/output data recorded. Last 3 shifts:  190 -  0700  In: 1000 [I.V.:1000]  Out: -        Physical Exam:     Physical Exam   Constitutional: He appears distressed. HENT:   Head: Normocephalic and atraumatic. Eyes: Pupils are equal, round, and reactive to light. Conjunctivae are normal.   Neck: Normal range of motion. Neck supple. Cardiovascular: Normal rate and regular rhythm. Pulmonary/Chest: He is in respiratory distress. Abdominal: Soft. Bowel sounds are normal.   Musculoskeletal:         General: Edema present. Neurological: He is alert.         Labs:    Recent Labs     01/16/21  0101 01/15/21  1730 01/15/21  1715   WBC 5.2  --  6.5   HGB 9.8*  --  10.5*     --  281   INR  --  1.3*  --    APTT  --  34.2  --      Recent Labs     01/16/21  0101 01/15/21  1730 01/15/21  1715   *  --  132*   K 3.3*  --  3.8     --  98   CO2 18*  --  20*   *  --  99   *  --  102*   CREA 5.00* --  5.37*   CA 8.3*  --  8.8   MG 2.8* 2.8*  --    LAC  --  1.2  --    ALB  --   --  2.8*   ALT  --   --  27     No results for input(s): PH, PCO2, PO2, HCO3, FIO2 in the last 72 hours. Recent Labs     01/15/21  1730   TROIQ <0.05     No results found for: BNPP, BNP   No results found for: CULT  Lab Results   Component Value Date/Time    TSH 1.08 01/15/2021 05:30 PM       Imaging:    CXR Results  (Last 48 hours)               01/15/21 1726  XR CHEST SNGL V Final result    Impression:  Impression: Airspace opacities in the lungs compatible with Covid-19 pneumonia. Narrative:  Chest, frontal view, 1/15/2021       History: Covid-19 positive. Comparison: Including chest 11/26/2015. Findings: There is surgical hardware in the cervical spine. The cardiac   silhouette is within normal limits. The lungs are underexpanded. There are   airspace opacities have been the lungs most pronounced in the perihilar regions   and bases compatible with Covid-19 pneumonia. Hydrostatic edema could also be   present. No pneumothorax is identified. Degenerative changes are present in   the thoracic spine. No results found for this or any previous visit. IMPRESSION:   1. Acute hypoxic respiratory failure  2. COVID-19 pneumonia  3. Chronic Obstructive Pulmonary Disease with Severe Acute Exacerbation requiring inpatient hospitalization and management; has very poor airway clearance. Increased work of breathing  4. Body mass index is 39.47 kg/m². 5. Acute on chronic kidney disease creatinine is 5 his GFR is 14 not a candidate for remdesivir  6. Will start patient on ivermectin 12 mg 1 dose  7. Pt is requiring Drug therapy requiring intensive monitoring for toxicity  8. Pt is unstable, unpredictable needing inpatient monitoring; is acutely ill and at high risk of sudden decline and decompensation with severe consequenses and continued end organ dysfunction and failure  9.  Prognosis guarded RECOMMENDATIONS/PLAN:     1. Patient is on nasal Cannula oxygen as salvage oxygen delivery device to provide high concentration of oxygen to overcome refractory hypoxia;  2. Continue with Decadron Zithromax and Rocephin  3. Intubate and place on vent if NIV fails  4. Agree with Empiric IV antibiotics pending culture results   5. Follow culture results  6. Supplemental O2 to keep sats > 93%  7. Aspiration precautions  8. Labs to follow electrolytes, renal function and and blood counts  9. Glucose monitoring and SSI  10. Bronchial hygiene with respiratory therapy techniques, bronchodilators  11. DVT, SUP prophylaxis         This care involved high complexity medical decision making: I personally:  · Reviewed the flowsheet and previous days notes  · Reviewed and summarized records or history from previous days note or discussions with staff, family  · High Risk Drug therapy requiring intensive monitoring for toxicity: eg steroids, pressors, antibiotics  · Reviewed and/or ordered Clinical lab tests  · Reviewed images and/or ordered Radiology tests  · Reviewed the patients ECG / Telemetry  · Reviewed and/or adjusted NiPPV settings  · Called and arranged for Radiologic procedures or interventions  · performed or ordered Diagnostic endoscopies with identified risk factors.   · discussed my assessment/management with : Nursing, Hospitalist and Family for coordination of care       Time spent in ER 55 min        Rashid Malloy MD

## 2021-01-16 NOTE — ED NOTES
TRANSFER - OUT REPORT:    Verbal report given to Maurice Miranda on [place]  being transferred to [phone] for routine progression of care       Report consisted of patients Situation, Background, Assessment and   Recommendations(SBAR). Information from the following report(s) SBAR was reviewed with the receiving nurse. Lines:   Peripheral IV 01/16/21 Right Antecubital (Active)   Site Assessment Clean, dry, & intact 01/16/21 0943   Phlebitis Assessment 0 01/16/21 0943   Infiltration Assessment 0 01/16/21 0943   Dressing Status Clean, dry, & intact 01/16/21 1168        Opportunity for questions and clarification was provided.       Patient transported with:   [employer]

## 2021-01-16 NOTE — PROGRESS NOTES
Hospitalist Progress Note    Subjective:   Daily Progress Note: 1/16/2021 8:26 AM    Hospital Course: Patient is a 78-year-old black male with a history of type 2 diabetes, hypertension, CKD stage IV presented to the ER on 1/15/2021 for generalized weakness, nonproductive cough, shortness of breath. Patient reports that he tested positive for Covid several days ago. In the last 24 hours he had decreased oral intake, loss of appetite, worsening shortness of breath, subjective fever and chills. In the ED patient's oxygen saturation was in the 80s while ambulating and therefore was placed on supplemental oxygen. Chest x-ray showed signs concerning for bilateral pneumonia suspicious for COVID-19. He was given 1 L of hydration with 1 dose of Decadron. D-dimer elevated 2.69. , lactic acid 1.2, procalcitonin 0.28, C-reactive protein 13.50. Patient continues on supplemental oxygen. Patient is on IV Decadron, azithromycin, Rocephin. Add on zinc, vitamin C, vitamin D. Will consult pulmonology to see if patient is a candidate for remdesivir. Subjective: Patient says he is feeling better. Says he is a little SOB.  No chest pain    Current Facility-Administered Medications   Medication Dose Route Frequency    potassium chloride SR (KLOR-CON 10) tablet 40 mEq  40 mEq Oral ONCE    zinc sulfate (ZINCATE) 220 (50) mg capsule 1 Cap  1 Cap Oral DAILY    ascorbic acid (vitamin C) (VITAMIN C) tablet 500 mg  500 mg Oral DAILY    cholecalciferol (VITAMIN D3) tablet 5,000 Units  5,000 Units Oral DAILY    albuterol (PROVENTIL HFA, VENTOLIN HFA, PROAIR HFA) inhaler 2 Puff  2 Puff Inhalation ONCE    sodium chloride (NS) flush 5-40 mL  5-40 mL IntraVENous Q8H    sodium chloride (NS) flush 5-40 mL  5-40 mL IntraVENous PRN    acetaminophen (TYLENOL) tablet 650 mg  650 mg Oral Q6H PRN    Or    acetaminophen (TYLENOL) suppository 650 mg  650 mg Rectal Q6H PRN    polyethylene glycol (MIRALAX) packet 17 g  17 g Oral DAILY PRN    promethazine (PHENERGAN) tablet 12.5 mg  12.5 mg Oral Q6H PRN    Or    ondansetron (ZOFRAN) injection 4 mg  4 mg IntraVENous Q6H PRN    heparin (porcine) injection 5,000 Units  5,000 Units SubCUTAneous Q8H    azithromycin (ZITHROMAX) 500 mg in 0.9% sodium chloride 250 mL (VIAL-MATE)  500 mg IntraVENous Q24H    cefTRIAXone (ROCEPHIN) 1 g in sterile water (preservative free) 10 mL IV syringe  1 g IntraVENous Q24H    glucose chewable tablet 16 g  4 Tab Oral PRN    dextrose (D50W) injection syrg 12.5-25 g  25-50 mL IntraVENous PRN    glucagon (GLUCAGEN) injection 1 mg  1 mg IntraMUSCular PRN    insulin lispro (HUMALOG) injection   SubCUTAneous AC&HS    hydrALAZINE (APRESOLINE) tablet 25 mg  25 mg Oral Q6H PRN    dexamethasone (DECADRON) 4 mg/mL injection 6 mg  6 mg IntraVENous ONCE     No current outpatient medications on file. Review of Systems  Constitutional: No fevers, No chills, No sweats, No fatigue, No Weakness  Eyes: No redness  Ears, nose, mouth, throat, and face: No nasal congestion, No sore throat, No voice change  Respiratory: ++ Shortness of Breath, ++ cough, No wheezing  Cardiovascular: No chest pain, No palpitations, No extremity edema  Gastrointestinal: No nausea, No vomiting, No diarrhea, No abdominal pain  Genitourinary: No frequency, No dysuria, No hematuria  Integument/breast: No skin lesion(s)   Neurological: No Confusion, No headaches, No dizziness      Objective:     Visit Vitals  BP (!) 151/77 (BP 1 Location: Left arm, BP Patient Position: At rest)   Pulse 60   Temp 98.3 °F (36.8 °C)   Resp 20   Ht 5' 7\" (1.702 m)   Wt 114.3 kg (252 lb)   SpO2 96%   BMI 39.47 kg/m²    O2 Flow Rate (L/min): 4 l/min O2 Device: Nasal cannula    Temp (24hrs), Av.3 °F (36.8 °C), Min:98.3 °F (36.8 °C), Max:98.3 °F (36.8 °C)      No intake/output data recorded.   1 -  0700  In: 1000 [I.V.:1000]  Out: -     PHYSICAL EXAM:  Constitutional: No acute distress  Skin: Extremities and face reveal no rashes. HEENT: Sclerae anicteric. Extra-occular muscles are intact. No oral ulcers. The neck is supple and no masses. Cardiovascular: Regular rate and rhythm. Respiratory:  Nonlabored, diminished  GI: Abdomen nondistended, soft, and nontender. Normal active bowel sounds. Musculoskeletal: No pitting edema of the lower legs. Able to move all ext  Neurological:  Patient is alert and oriented. Cranial nerves II-XII grossly intact  Psychiatric: Mood appears appropriate       Data Review    Recent Results (from the past 24 hour(s))   CBC WITH AUTOMATED DIFF    Collection Time: 01/15/21  5:15 PM   Result Value Ref Range    WBC 6.5 4.1 - 11.1 K/uL    RBC 4.00 (L) 4.10 - 5.70 M/uL    HGB 10.5 (L) 12.1 - 17.0 g/dL    HCT 31.1 (L) 36.6 - 50.3 %    MCV 77.8 (L) 80.0 - 99.0 FL    MCH 26.3 26.0 - 34.0 PG    MCHC 33.8 30.0 - 36.5 g/dL    RDW 15.2 (H) 11.5 - 14.5 %    PLATELET 277 831 - 183 K/uL    MPV 10.1 8.9 - 12.9 FL    NEUTROPHILS 91 (H) 32 - 75 %    LYMPHOCYTES 6 (L) 12 - 49 %    MONOCYTES 3 (L) 5 - 13 %    EOSINOPHILS 0 0 - 7 %    BASOPHILS 0 0 - 1 %    IMMATURE GRANULOCYTES 0 0.0 - 0.5 %    ABS. NEUTROPHILS 5.9 1.8 - 8.0 K/UL    ABS. LYMPHOCYTES 0.4 (L) 0.8 - 3.5 K/UL    ABS. MONOCYTES 0.2 0.0 - 1.0 K/UL    ABS. EOSINOPHILS 0.0 0.0 - 0.4 K/UL    ABS. BASOPHILS 0.0 0.0 - 0.1 K/UL    ABS. IMM.  GRANS. 0.0 0.00 - 0.04 K/UL    DF AUTOMATED     METABOLIC PANEL, COMPREHENSIVE    Collection Time: 01/15/21  5:15 PM   Result Value Ref Range    Sodium 132 (L) 136 - 145 mmol/L    Potassium 3.8 3.5 - 5.1 mmol/L    Chloride 98 97 - 108 mmol/L    CO2 20 (L) 21 - 32 mmol/L    Anion gap 14 5 - 15 mmol/L    Glucose 99 65 - 100 mg/dL     (H) 6 - 20 mg/dL    Creatinine 5.37 (H) 0.70 - 1.30 mg/dL    BUN/Creatinine ratio 19 12 - 20      GFR est AA 13 (L) >60 ml/min/1.73m2    GFR est non-AA 11 (L) >60 ml/min/1.73m2    Calcium 8.8 8.5 - 10.1 mg/dL    Bilirubin, total 0.4 0.2 - 1.0 mg/dL    AST (SGOT) 28 15 - 37 U/L    ALT (SGPT) 27 12 - 78 U/L    Alk.  phosphatase 61 45 - 117 U/L    Protein, total 8.0 6.4 - 8.2 g/dL    Albumin 2.8 (L) 3.5 - 5.0 g/dL    Globulin 5.2 (H) 2.0 - 4.0 g/dL    A-G Ratio 0.5 (L) 1.1 - 2.2     TROPONIN I    Collection Time: 01/15/21  5:30 PM   Result Value Ref Range    Troponin-I, Qt. <0.05 <0.05 ng/mL   BNP    Collection Time: 01/15/21  5:30 PM   Result Value Ref Range    NT pro- (H) <125 pg/mL   MAGNESIUM    Collection Time: 01/15/21  5:30 PM   Result Value Ref Range    Magnesium 2.8 (H) 1.6 - 2.4 mg/dL   TSH 3RD GENERATION    Collection Time: 01/15/21  5:30 PM   Result Value Ref Range    TSH 1.08 0.36 - 3.74 uIU/mL   LD    Collection Time: 01/15/21  5:30 PM   Result Value Ref Range     (H) 85 - 241 U/L   C REACTIVE PROTEIN, QT    Collection Time: 01/15/21  5:30 PM   Result Value Ref Range    C-Reactive protein 13.50 (H) 0.00 - 0.60 mg/dL   D DIMER    Collection Time: 01/15/21  5:30 PM   Result Value Ref Range    D DIMER 2.69 (H) <0.50 ug/ml(FEU)   LACTIC ACID    Collection Time: 01/15/21  5:30 PM   Result Value Ref Range    Lactic acid 1.2 0.4 - 2.0 mmol/L   PROCALCITONIN    Collection Time: 01/15/21  5:30 PM   Result Value Ref Range    Procalcitonin 0.28 (H) 0 ng/mL   PROTHROMBIN TIME + INR    Collection Time: 01/15/21  5:30 PM   Result Value Ref Range    Prothrombin time 15.7 (H) 11.9 - 14.7 sec    INR 1.3 (H) 0.9 - 1.1     PTT    Collection Time: 01/15/21  5:30 PM   Result Value Ref Range    aPTT 34.2 23.0 - 35.7 sec    aPTT, therapeutic range   68 - 086 sec   METABOLIC PANEL, BASIC    Collection Time: 01/16/21  1:01 AM   Result Value Ref Range    Sodium 134 (L) 136 - 145 mmol/L    Potassium 3.3 (L) 3.5 - 5.1 mmol/L    Chloride 102 97 - 108 mmol/L    CO2 18 (L) 21 - 32 mmol/L    Anion gap 14 5 - 15 mmol/L    Glucose 157 (H) 65 - 100 mg/dL     (H) 6 - 20 mg/dL    Creatinine 5.00 (H) 0.70 - 1.30 mg/dL    BUN/Creatinine ratio 20 12 - 20      GFR est AA 14 (L) >60 ml/min/1.73m2    GFR est non-AA 12 (L) >60 ml/min/1.73m2    Calcium 8.3 (L) 8.5 - 10.1 mg/dL   MAGNESIUM    Collection Time: 01/16/21  1:01 AM   Result Value Ref Range    Magnesium 2.8 (H) 1.6 - 2.4 mg/dL   CBC W/O DIFF    Collection Time: 01/16/21  1:01 AM   Result Value Ref Range    WBC 5.2 4.1 - 11.1 K/uL    RBC 3.50 (L) 4.10 - 5.70 M/uL    HGB 9.8 (L) 12.1 - 17.0 g/dL    HCT 27.3 (L) 36.6 - 50.3 %    MCV 78.0 (L) 80.0 - 99.0 FL    MCH 28.0 26.0 - 34.0 PG    MCHC 35.9 30.0 - 36.5 g/dL    RDW 15.3 (H) 11.5 - 14.5 %    PLATELET 326 330 - 692 K/uL    MPV 10.2 8.9 - 12.9 FL   GLUCOSE, POC    Collection Time: 01/16/21  8:09 AM   Result Value Ref Range    Glucose (POC) 145 (H) 65 - 100 mg/dL    Performed by [address] [place] [address]        Radiology review: Chest xray    Assessment:   1. Bilateral pneumonia concerning for COVID-19  2. Type 2 diabetes  3. Hypertension  4. Obesity  5. CKD stage IV    Plan:    1. Oxygen saturation within normal limits on 2. He has been afebrile. WBC within normal limits. D-dimer 2.69. , procalcitonin 0.28, lactic acid 1.2, C-reactive protein 13.50. He is on IV Rocephin and azithromycin. Will add on IV Decadron. On zinc, vitamin C, vitamin D. Consult pulmonology  2. Blood glucose level stable. Continue with insulin sliding scale. May need to make further adjustments as he is on IV steroids. 3.  Patient cannot remember his home medication for blood pressure control. Blood pressure is elevated. We will start Norvasc. 4.   with a serum creatinine of 5.00. Potassium 3.3. Will replenish. We will continue to monitor renal function. May consider nephrology consult. 5.  CBC BMP in a.m. CODE STATUS Full     DVT prophylaxis: Heparin  Ulcer prophylaxis: Protonix    Care Plan discussed with: Patient/Family and Nurse    Total time spent with patient: 34 minutes.

## 2021-01-16 NOTE — H&P
History and Physical    Patient: Malinda Camp MRN: [id number]  SSN: [id number]    YOB: 1955  Age: 77 y.o. Sex: male      Subjective:      Chief Complaint: Shortness of breath. HPI: Malinda Camp is a 77 y.o. male with past medical history of noninsulin-dependent diabetes mellitus type 2, hypertension and chronic kidney disease stage IV (unknown baseline creatinine) presenting to the ER with complaints of generalized weakness, nonproductive cough and shortness of breath. Mr. Bazzi  reports a positive COVID-19 test several days ago. Over the past 24 hours, he has had increased generalized weakness, decreased p.o. intake, loss of appetite, worsening shortness of breath and dyspnea on exertion. He has had a subjective fever and chills. This evening, emergency medical services were called. On arrival to patient's home, EMS reports that oxygen saturation was 92% on room air. Patient was placed on 2 L of oxygen via nasal cannula and oxygen saturations improved to 98%. On arrival to the ER, temperature is 98.3 °F, blood pressure 148/89, pulse 85, respirations 16 and oxygen saturation was 98% on 2 L of oxygen via nasal cannula. Chest x-ray has bilateral opacities. Abnormal laboratory work includes increased procalcitonin, CRP and LDH. Mr. Rose Mary Palomares was hydrated with 1 L normal saline and given 1 dose of Decadron IV. With ambulation, Mr. Rose Mary Palomares became hypoxic with oxygen saturations into the mid 80s. Hospital service has been asked to admit Mr. Rose Mary Palomares for further treatment and evaluation of acute hypoxic respiratory failure secondary to COVID-19 and bilateral pneumonia. Mr. Rose Mary Palomares was a poor historian. He is unsure of his home medication list.  He does endorse being on an oral medication for diabetes mellitus type 2. I reviewed past medical history, past surgical history, family history and social history with patient at the time of admission.   Mr. Rose Mary Palomares lives alone and is a full code. Niece, Ms. Anupama Cao, can be reached at [phone] in the event of an emergency. Mr. Chung Valente uses a walker to assist with ambulation. Past Medical History:   Diagnosis Date    CKD (chronic kidney disease) stage 4, GFR 15-29 ml/min ([facility])     DM [facility]diabetes mellitus) ([facility] [address]     H/O cervical spine surgery     HTN (hypertension)      Past Surgical History:   Procedure Laterality Date    HX ORTHOPAEDIC      Spine Surgery      Family History   Problem Relation Age of Onset    Coronary Artery Disease Mother     Heart Failure Mother     Pacemaker Sister      Social History     Tobacco Use    Smoking status: Never Smoker    Smokeless tobacco: Never Used   Substance Use Topics    Alcohol use: Not Currently      Prior to Admission medications    Not on File        No Known Allergies    Review of Systems:  Constitutional: Positive for subjective fever, chills, fatigue, weakness Denies unexplained weight loss, night sweats. Head, Eyes, Ears, Nose, Mouth, Throat: Denies nasal congestion, sore throat, rhinorrhea, earache, ringing of the ears, difficulty hearing, facial pain, facial swelling. Respiratory: Positive for shortness of breath,dyspnea on exertion, non-productive cough. Denies sputum production, hemoptysis. Denies use of oxygen at home. Cardiovascular: Denies chest pain, irregular heart beat, racing pulse, lower extremity edema, dizziness, dyspnea on exertion, orthopnea. Gastrointestinal: Positive for anorexia, decrease PO intake. Denies nausea, vomiting, diarrhea, constipation, abdominal pain, acid reflux, melena, hematochezia, change in bowel habits. Endocrine: Denies intolerance to heat or cold. Denies polyuria, polydipsia, polyphagia. Denies recent weight changes. Genitourinary: Denies increased urinary frequency, dysuria, hematuria, urinary incontinence, increased urinary frequency. Integument/Breast: Denies rash, itching or new skin lesions.   Musculoskeletal: Denies joint swelling, joint pain, myalgias, neck pain, back pain. Neurological: Denies headaches, dizziness, confusion, tremors, numbness/tingling, paresthesias, weakness, problems with balance, loss of consciousness. Hematologic: Denies easy bleeding, easy bruising, lymphadenopathy. Behavioral/Psychiatric: Denies anxiety, depression, increased irritability, mood swings, delusions, hallucination, SI/HI. Objective:     Vitals:    01/15/21 2033 01/15/21 2123 01/15/21 2124 01/15/21 2130   BP: (!) 145/86   (!) 145/84   Pulse: 75   81   Resp: 24   19   Temp:       SpO2: 95% (!) 87% 97% 96%   Weight:       Height:            Physical Exam:  General: Alert and Oriented x 3. Cooperative and friendly. No acute distress. Nourished and well developed. Head/Eyes: Normocephalic, atraumatic, EOMI, PERRLA. Nose/Mouth: Turbinates within normal limits, No drainage. Mucous membranes are moist.   Throat and Neck: No masses, JVD, thyromegaly or lymphadenopathy appreciated. Cervical spine has good range of motion without pain. Lungs: Decreased breath sounds bilaterally with rhonchi. No wheezing or crackles. Good air movement bilaterally. Symmetric chest rise with respirations. Heart: Regular rate and rhythm. Normal S1/S2. No appreciated murmurs, rubs or gallops. Abdomen: Soft, non-tender, non-distended. Bowel sounds present in all four quadrants. No masses appreciated. Extremities:  Atraumatic. Able to move all extremities symmetrically. No abnormal bony protuberances appreciated. Back: No pain with palpation over spinous processes or paraspinal musculature. No CVA tenderness. Skin: Clean, dry and intact without appreciated lesions. Neurologic: A&Ox3. Cranial nerves 2-12 are grossly intact. No focal deficits. Psychiatric: Normal affect, normal thought process, good eye contact.      Recent Results (from the past 24 hour(s))   CBC WITH AUTOMATED DIFF    Collection Time: 01/15/21  5:15 PM   Result Value Ref Range    WBC 6.5 4.1 - 11.1 K/uL RBC 4.00 (L) 4.10 - 5.70 M/uL    HGB 10.5 (L) 12.1 - 17.0 g/dL    HCT 31.1 (L) 36.6 - 50.3 %    MCV 77.8 (L) 80.0 - 99.0 FL    MCH 26.3 26.0 - 34.0 PG    MCHC 33.8 30.0 - 36.5 g/dL    RDW 15.2 (H) 11.5 - 14.5 %    PLATELET 152 034 - 906 K/uL    MPV 10.1 8.9 - 12.9 FL    NEUTROPHILS 91 (H) 32 - 75 %    LYMPHOCYTES 6 (L) 12 - 49 %    MONOCYTES 3 (L) 5 - 13 %    EOSINOPHILS 0 0 - 7 %    BASOPHILS 0 0 - 1 %    IMMATURE GRANULOCYTES 0 0.0 - 0.5 %    ABS. NEUTROPHILS 5.9 1.8 - 8.0 K/UL    ABS. LYMPHOCYTES 0.4 (L) 0.8 - 3.5 K/UL    ABS. MONOCYTES 0.2 0.0 - 1.0 K/UL    ABS. EOSINOPHILS 0.0 0.0 - 0.4 K/UL    ABS. BASOPHILS 0.0 0.0 - 0.1 K/UL    ABS. IMM. GRANS. 0.0 0.00 - 0.04 K/UL    DF AUTOMATED     METABOLIC PANEL, COMPREHENSIVE    Collection Time: 01/15/21  5:15 PM   Result Value Ref Range    Sodium 132 (L) 136 - 145 mmol/L    Potassium 3.8 3.5 - 5.1 mmol/L    Chloride 98 97 - 108 mmol/L    CO2 20 (L) 21 - 32 mmol/L    Anion gap 14 5 - 15 mmol/L    Glucose 99 65 - 100 mg/dL     (H) 6 - 20 mg/dL    Creatinine 5.37 (H) 0.70 - 1.30 mg/dL    BUN/Creatinine ratio 19 12 - 20      GFR est AA 13 (L) >60 ml/min/1.73m2    GFR est non-AA 11 (L) >60 ml/min/1.73m2    Calcium 8.8 8.5 - 10.1 mg/dL    Bilirubin, total 0.4 0.2 - 1.0 mg/dL    AST (SGOT) 28 15 - 37 U/L    ALT (SGPT) 27 12 - 78 U/L    Alk.  phosphatase 61 45 - 117 U/L    Protein, total 8.0 6.4 - 8.2 g/dL    Albumin 2.8 (L) 3.5 - 5.0 g/dL    Globulin 5.2 (H) 2.0 - 4.0 g/dL    A-G Ratio 0.5 (L) 1.1 - 2.2     TROPONIN I    Collection Time: 01/15/21  5:30 PM   Result Value Ref Range    Troponin-I, Qt. <0.05 <0.05 ng/mL   BNP    Collection Time: 01/15/21  5:30 PM   Result Value Ref Range    NT pro- (H) <125 pg/mL   MAGNESIUM    Collection Time: 01/15/21  5:30 PM   Result Value Ref Range    Magnesium 2.8 (H) 1.6 - 2.4 mg/dL   TSH 3RD GENERATION    Collection Time: 01/15/21  5:30 PM   Result Value Ref Range    TSH 1.08 0.36 - 3.74 uIU/mL   LD    Collection Time: 01/15/21  5:30 PM   Result Value Ref Range     (H) 85 - 241 U/L   C REACTIVE PROTEIN, QT    Collection Time: 01/15/21  5:30 PM   Result Value Ref Range    C-Reactive protein 13.50 (H) 0.00 - 0.60 mg/dL   D DIMER    Collection Time: 01/15/21  5:30 PM   Result Value Ref Range    D DIMER 2.69 (H) <0.50 ug/ml(FEU)   LACTIC ACID    Collection Time: 01/15/21  5:30 PM   Result Value Ref Range    Lactic acid 1.2 0.4 - 2.0 mmol/L   PROCALCITONIN    Collection Time: 01/15/21  5:30 PM   Result Value Ref Range    Procalcitonin 0.28 (H) 0 ng/mL   PROTHROMBIN TIME + INR    Collection Time: 01/15/21  5:30 PM   Result Value Ref Range    Prothrombin time 15.7 (H) 11.9 - 14.7 sec    INR 1.3 (H) 0.9 - 1.1     PTT    Collection Time: 01/15/21  5:30 PM   Result Value Ref Range    aPTT 34.2 23.0 - 35.7 sec    aPTT, therapeutic range   68 - 109 sec       Assessment:     Ariadna Cuevas is a 77 y.o. male who presents with shortness of breath, subjective fever, chills, generalized weakness and decreased p.o. intake. Mr. Laqueta Epley had a positive COVID-19 test several days ago. Admit for acute hypoxic respiratory failure secondary to COVID-19 and bilateral pneumonia. Plan:     1. Admit to telemetry bed. 2. Order albuterol PRN SOB or wheezing. Order scheduled IV Decadron. 3. Continue oxygen via nasal cannula for oxygen saturations <93%. 4. Continue isolation precautions. 5. Monitor blood cultures. Order sputum culture. Start IV ceftriaxone and azithromycin. 6. For history of non-insulin-dependent diabetes mellitus type 2, check blood glucose with meals and at bedtime. Order sensitive sliding scale insulin. 7. For history of hypertension, patient is unsure of home hypertensive medications. Order hydralazine 25 mg p.o. every 6 hours as needed for systolic blood pressure greater than 140. GI PPX: Diet ordered. DVT PPX: Heparin SQ.      Signed By: Jaylen Christopher MD     January 15, 2021

## 2021-01-16 NOTE — PROGRESS NOTES
Reason for Admission:   Pneumonitis, COVID 19                   RUR Score: 15%                 PCP: First and Last name:     Name of Practice: [address]   Are you a current patient: Yes/No: yes   Approximate date of last visit: 1/14/21   Can you participate in a virtual visit if needed:     Do you (patient/family) have any concerns for transition/discharge? no                Plan for utilizing home health:       Current Advanced Directive/Advance Care Plan:              Transition of Care Plan:   Home with follow up office visit. Spoke with the patient. The patient lives alone,modified independent in ADL,  ambulate with walker (s/p Cervical Spine surgery). His niece, Juma [facility] [phone] is his NOK .

## 2021-01-17 ENCOUNTER — APPOINTMENT (OUTPATIENT)
Dept: GENERAL RADIOLOGY | Age: 66
DRG: 177 | End: 2021-01-17
Attending: INTERNAL MEDICINE
Payer: MEDICARE

## 2021-01-17 LAB
ANION GAP SERPL CALC-SCNC: 11 MMOL/L (ref 5–15)
ARTERIAL PATENCY WRIST A: ABNORMAL
BASE DEFICIT BLDA-SCNC: 6.2 MMOL/L (ref 0–2)
BASOPHILS # BLD: 0 K/UL (ref 0–0.1)
BASOPHILS NFR BLD: 0 % (ref 0–1)
BDY SITE: ABNORMAL
BUN SERPL-MCNC: 92 MG/DL (ref 6–20)
BUN/CREAT SERPL: 22 (ref 12–20)
CA-I BLD-MCNC: 8.3 MG/DL (ref 8.5–10.1)
CHLORIDE SERPL-SCNC: 107 MMOL/L (ref 97–108)
CO2 SERPL-SCNC: 20 MMOL/L (ref 21–32)
CREAT SERPL-MCNC: 4.15 MG/DL (ref 0.7–1.3)
CRP SERPL-MCNC: 6.52 MG/DL (ref 0–0.6)
DIFFERENTIAL METHOD BLD: ABNORMAL
EOSINOPHIL # BLD: 0 K/UL (ref 0–0.4)
EOSINOPHIL NFR BLD: 0 % (ref 0–7)
ERYTHROCYTE [DISTWIDTH] IN BLOOD BY AUTOMATED COUNT: 15.2 % (ref 11.5–14.5)
GAS FLOW.O2 O2 DELIVERY SYS: 1 L/MIN
GLUCOSE BLD STRIP.AUTO-MCNC: 150 MG/DL (ref 65–100)
GLUCOSE BLD STRIP.AUTO-MCNC: 152 MG/DL (ref 65–100)
GLUCOSE BLD STRIP.AUTO-MCNC: 153 MG/DL (ref 65–100)
GLUCOSE BLD STRIP.AUTO-MCNC: 156 MG/DL (ref 65–100)
GLUCOSE BLD STRIP.AUTO-MCNC: 174 MG/DL (ref 65–100)
GLUCOSE SERPL-MCNC: 154 MG/DL (ref 65–100)
HCO3 BLDA-SCNC: 19 MMOL/L (ref 22–26)
HCT VFR BLD AUTO: 27.6 % (ref 36.6–50.3)
HGB BLD-MCNC: 9.5 G/DL (ref 12.1–17)
IMM GRANULOCYTES # BLD AUTO: 0.1 K/UL (ref 0–0.04)
IMM GRANULOCYTES NFR BLD AUTO: 1 % (ref 0–0.5)
LYMPHOCYTES # BLD: 0.4 K/UL (ref 0.8–3.5)
LYMPHOCYTES NFR BLD: 4 % (ref 12–49)
MCH RBC QN AUTO: 26.6 PG (ref 26–34)
MCHC RBC AUTO-ENTMCNC: 34.4 G/DL (ref 30–36.5)
MCV RBC AUTO: 77.3 FL (ref 80–99)
MONOCYTES # BLD: 0.3 K/UL (ref 0–1)
MONOCYTES NFR BLD: 4 % (ref 5–13)
NEUTS SEG # BLD: 7.4 K/UL (ref 1.8–8)
NEUTS SEG NFR BLD: 91 % (ref 32–75)
PCO2 BLDA: 29 MMHG (ref 35–45)
PERFORMED BY, TECHID: ABNORMAL
PH BLDA: 7.39 [PH] (ref 7.35–7.45)
PLATELET # BLD AUTO: 322 K/UL (ref 150–400)
PMV BLD AUTO: 10.3 FL (ref 8.9–12.9)
PO2 BLDA: 64 MMHG (ref 75–100)
POTASSIUM SERPL-SCNC: 3.5 MMOL/L (ref 3.5–5.1)
PROCALCITONIN SERPL-MCNC: 0.11 NG/ML
RBC # BLD AUTO: 3.57 M/UL (ref 4.1–5.7)
SAO2 % BLD: 93 %
SAO2% DEVICE SAO2% SENSOR NAME: ABNORMAL
SODIUM SERPL-SCNC: 138 MMOL/L (ref 136–145)
WBC # BLD AUTO: 8 K/UL (ref 4.1–11.1)

## 2021-01-17 PROCEDURE — 74011250637 HC RX REV CODE- 250/637: Performed by: PHYSICIAN ASSISTANT

## 2021-01-17 PROCEDURE — 82803 BLOOD GASES ANY COMBINATION: CPT

## 2021-01-17 PROCEDURE — 74011250636 HC RX REV CODE- 250/636: Performed by: PHYSICIAN ASSISTANT

## 2021-01-17 PROCEDURE — 74011000250 HC RX REV CODE- 250: Performed by: FAMILY MEDICINE

## 2021-01-17 PROCEDURE — 71045 X-RAY EXAM CHEST 1 VIEW: CPT

## 2021-01-17 PROCEDURE — 85025 COMPLETE CBC W/AUTO DIFF WBC: CPT

## 2021-01-17 PROCEDURE — 74011250636 HC RX REV CODE- 250/636: Performed by: FAMILY MEDICINE

## 2021-01-17 PROCEDURE — 86140 C-REACTIVE PROTEIN: CPT

## 2021-01-17 PROCEDURE — 65270000029 HC RM PRIVATE

## 2021-01-17 PROCEDURE — 80048 BASIC METABOLIC PNL TOTAL CA: CPT

## 2021-01-17 PROCEDURE — 36415 COLL VENOUS BLD VENIPUNCTURE: CPT

## 2021-01-17 PROCEDURE — 82962 GLUCOSE BLOOD TEST: CPT

## 2021-01-17 PROCEDURE — 84145 PROCALCITONIN (PCT): CPT

## 2021-01-17 RX ADMIN — DEXAMETHASONE SODIUM PHOSPHATE 4 MG: 4 INJECTION, SOLUTION INTRA-ARTICULAR; INTRALESIONAL; INTRAMUSCULAR; INTRAVENOUS; SOFT TISSUE at 05:15

## 2021-01-17 RX ADMIN — DEXAMETHASONE SODIUM PHOSPHATE 4 MG: 4 INJECTION, SOLUTION INTRA-ARTICULAR; INTRALESIONAL; INTRAMUSCULAR; INTRAVENOUS; SOFT TISSUE at 20:33

## 2021-01-17 RX ADMIN — LEVOTHYROXINE SODIUM 50 MCG: 0.03 TABLET ORAL at 09:20

## 2021-01-17 RX ADMIN — CEFTRIAXONE SODIUM 1 G: 1 INJECTION, POWDER, FOR SOLUTION INTRAMUSCULAR; INTRAVENOUS at 20:38

## 2021-01-17 RX ADMIN — HYDRALAZINE HYDROCHLORIDE 100 MG: 50 TABLET, FILM COATED ORAL at 20:31

## 2021-01-17 RX ADMIN — FAMOTIDINE 20 MG: 20 TABLET, FILM COATED ORAL at 09:19

## 2021-01-17 RX ADMIN — CHOLECALCIFEROL TAB 125 MCG (5000 UNIT) 5000 UNITS: 125 TAB at 09:20

## 2021-01-17 RX ADMIN — CALCITRIOL CAPSULES 0.25 MCG 0.25 MCG: 0.25 CAPSULE ORAL at 09:20

## 2021-01-17 RX ADMIN — DEXAMETHASONE SODIUM PHOSPHATE 4 MG: 4 INJECTION, SOLUTION INTRA-ARTICULAR; INTRALESIONAL; INTRAMUSCULAR; INTRAVENOUS; SOFT TISSUE at 16:30

## 2021-01-17 RX ADMIN — HYDRALAZINE HYDROCHLORIDE 100 MG: 50 TABLET, FILM COATED ORAL at 16:31

## 2021-01-17 RX ADMIN — ALLOPURINOL 100 MG: 100 TABLET ORAL at 09:19

## 2021-01-17 RX ADMIN — OXYCODONE HYDROCHLORIDE AND ACETAMINOPHEN 500 MG: 500 TABLET ORAL at 09:20

## 2021-01-17 RX ADMIN — HYDRALAZINE HYDROCHLORIDE 100 MG: 50 TABLET, FILM COATED ORAL at 09:19

## 2021-01-17 RX ADMIN — AMLODIPINE BESYLATE 10 MG: 5 TABLET ORAL at 09:20

## 2021-01-17 RX ADMIN — HEPARIN SODIUM 5000 UNITS: 5000 INJECTION INTRAVENOUS; SUBCUTANEOUS at 05:15

## 2021-01-17 RX ADMIN — FAMOTIDINE 20 MG: 20 TABLET, FILM COATED ORAL at 20:31

## 2021-01-17 RX ADMIN — Medication 10 ML: at 14:00

## 2021-01-17 RX ADMIN — AZITHROMYCIN DIHYDRATE 500 MG: 500 INJECTION, POWDER, LYOPHILIZED, FOR SOLUTION INTRAVENOUS at 20:32

## 2021-01-17 RX ADMIN — HEPARIN SODIUM 5000 UNITS: 5000 INJECTION INTRAVENOUS; SUBCUTANEOUS at 20:32

## 2021-01-17 RX ADMIN — Medication 10 ML: at 05:15

## 2021-01-17 RX ADMIN — Medication 1 CAPSULE: at 09:20

## 2021-01-17 RX ADMIN — HEPARIN SODIUM 5000 UNITS: 5000 INJECTION INTRAVENOUS; SUBCUTANEOUS at 16:30

## 2021-01-17 NOTE — PROGRESS NOTES
Admission skin assessment done by author and KAELA Shelyb RN. Skin is intact. Moisture associated redness noted in abdominal folds and groin.

## 2021-01-17 NOTE — PROGRESS NOTES
Hospitalist Progress Note    Subjective:   Daily Progress Note: 1/17/2021 8:26 AM    Hospital Course: Patient is a 73-year-old black male with a history of type 2 diabetes, hypertension, CKD stage IV presented to the ER on 1/15/2021 for generalized weakness, nonproductive cough, shortness of breath. Patient reports that he tested positive for Covid several days ago. In the last 24 hours he had decreased oral intake, loss of appetite, worsening shortness of breath, subjective fever and chills. In the ED patient's oxygen saturation was in the 80s while ambulating and therefore was placed on supplemental oxygen. Chest x-ray showed signs concerning for bilateral pneumonia suspicious for COVID-19. He was given 1 L of hydration with 1 dose of Decadron. D-dimer elevated 2.69. , lactic acid 1.2, procalcitonin 0.28, C-reactive protein 13.50. Patient continues on supplemental oxygen. Patient is on IV Decadron, azithromycin, Rocephin. Add on zinc, vitamin C, vitamin D. Pulmonary Consulted. Subjective: Patient denies any SOB, chest pain or cough. Is on 4L NC at 95%.      Current Facility-Administered Medications   Medication Dose Route Frequency    zinc sulfate (ZINCATE) 220 (50) mg capsule 1 Cap  1 Cap Oral DAILY    ascorbic acid (vitamin C) (VITAMIN C) tablet 500 mg  500 mg Oral DAILY    cholecalciferol (VITAMIN D3) tablet 5,000 Units  5,000 Units Oral DAILY    dexamethasone (DECADRON) 4 mg/mL injection 4 mg  4 mg IntraVENous Q8H    allopurinoL (ZYLOPRIM) tablet 100 mg  100 mg Oral DAILY    amLODIPine (NORVASC) tablet 10 mg  10 mg Oral DAILY    [Held by provider] atenoloL (TENORMIN) tablet 50 mg  50 mg Oral DAILY    calcitRIOL (ROCALTROL) capsule 0.25 mcg  0.25 mcg Oral DAILY    famotidine (PEPCID) tablet 20 mg  20 mg Oral BID    hydrALAZINE (APRESOLINE) tablet 100 mg  100 mg Oral TID    levothyroxine (SYNTHROID) tablet 50 mcg  50 mcg Oral ACB    sodium chloride (NS) flush 5-40 mL  5-40 mL IntraVENous Q8H    sodium chloride (NS) flush 5-40 mL  5-40 mL IntraVENous PRN    acetaminophen (TYLENOL) tablet 650 mg  650 mg Oral Q6H PRN    Or    acetaminophen (TYLENOL) suppository 650 mg  650 mg Rectal Q6H PRN    polyethylene glycol (MIRALAX) packet 17 g  17 g Oral DAILY PRN    promethazine (PHENERGAN) tablet 12.5 mg  12.5 mg Oral Q6H PRN    Or    ondansetron (ZOFRAN) injection 4 mg  4 mg IntraVENous Q6H PRN    heparin (porcine) injection 5,000 Units  5,000 Units SubCUTAneous Q8H    azithromycin (ZITHROMAX) 500 mg in 0.9% sodium chloride 250 mL (VIAL-MATE)  500 mg IntraVENous Q24H    cefTRIAXone (ROCEPHIN) 1 g in sterile water (preservative free) 10 mL IV syringe  1 g IntraVENous Q24H    glucose chewable tablet 16 g  4 Tab Oral PRN    dextrose (D50W) injection syrg 12.5-25 g  25-50 mL IntraVENous PRN    glucagon (GLUCAGEN) injection 1 mg  1 mg IntraMUSCular PRN    insulin lispro (HUMALOG) injection   SubCUTAneous AC&HS        Review of Systems  Constitutional: No fevers, No chills, No sweats, No fatigue, +Weakness  Eyes: No redness  Ears, nose, mouth, throat, and face: No nasal congestion, No sore throat, No voice change  Respiratory: ++ Shortness of Breath, ++ cough, No wheezing  Cardiovascular: No chest pain, No palpitations, No extremity edema  Gastrointestinal: No nausea, No vomiting, No diarrhea, No abdominal pain  Genitourinary: No frequency, No dysuria, No hematuria  Integument/breast: No skin lesion(s)   Neurological: No Confusion, No headaches, No dizziness      Objective:     Visit Vitals  /76   Pulse 73   Temp 97.8 °F (36.6 °C)   Resp 20   Ht 5' 7\" (1.702 m)   Wt 114.3 kg (252 lb)   SpO2 93%   BMI 39.47 kg/m²    O2 Flow Rate (L/min): 1 l/min O2 Device: Nasal cannula    Temp (24hrs), Av.8 °F (36.6 °C), Min:97.8 °F (36.6 °C), Max:97.8 °F (36.6 °C)      No intake/output data recorded.   01/15 1901 -  0700  In: 1000 [I.V.:1000]  Out: -     PHYSICAL EXAM:  Constitutional: No acute distress  Skin: Extremities and face reveal no rashes. HEENT: Sclerae anicteric. Extra-occular muscles are intact. No oral ulcers. The neck is supple and no masses. Cardiovascular: Regular rate and rhythm. Respiratory:  Nonlabored, diminished, no wheezing  GI: Abdomen nondistended, soft, and nontender. Normal active bowel sounds. Musculoskeletal: No pitting edema of the lower legs. Able to move all ext  Neurological:  Patient is alert and oriented. Cranial nerves II-XII grossly intact  Psychiatric: Mood appears appropriate       Data Review    Recent Results (from the past 24 hour(s))   SARS-COV-2    Collection Time: 01/16/21  9:00 AM   Result Value Ref Range    SARS-CoV-2 Nasopharyngeal     COVID-19 RAPID TEST    Collection Time: 01/16/21  9:00 AM   Result Value Ref Range    Specimen source Nasopharyngeal      COVID-19 rapid test DETECTED (A) Not Detected     GLUCOSE, POC    Collection Time: 01/16/21 12:28 PM   Result Value Ref Range    Glucose (POC) 149 (H) 65 - 100 mg/dL    Performed by Christophe Stewart    GLUCOSE, POC    Collection Time: 01/16/21  8:06 PM   Result Value Ref Range    Glucose (POC) 177 (H) 65 - 100 mg/dL    Performed by [facility]    BLOOD GAS, ARTERIAL    Collection Time: 01/17/21  4:20 AM   Result Value Ref Range    pH 7.39 7.35 - 7.45      PCO2 29 (L) 35 - 45 mmHg    PO2 64 (L) 75 - 100 mmHg    O2 SAT 93 (L) >95 %    BICARBONATE 19 (L) 22 - 26 mmol/L    BASE DEFICIT 6.2 (H) 0 - 2 mmol/L    O2 METHOD Nasal Cannula      O2 FLOW RATE 1 L/min    SITE Right Radial      OMKAR'S TEST PASS         Radiology review: Chest xray    Assessment:   1. Bilateral pneumonia concerning for COVID-19  2. Type 2 diabetes  3. Hypertension  4. Obesity  5. CKD stage IV  6. Hypothyroidism. Plan:    1. Oxygen saturation within normal limits on 4L. Wean to 3L. He has been afebrile. WBC within normal limits. D-dimer 2.69.   , procalcitonin 0.28, lactic acid 1.2, C-reactive protein 13. 50. He is on IV Rocephin and azithromycin,  IV Decadron. On zinc, vitamin C, vitamin D. Consult pulmonology  2. Blood glucose level stable. Continue with insulin sliding scale. May need to make further adjustments as he is on IV steroids. 3.  BP stable. Holding atenolol due to bradycardia. On norvasc and hydralzine. 4.  Renal function pending for today. We will continue to monitor. May consider nephology consult if worsens. 5.  On synthroid  6. CBC and BMP in the AM   7. PT/OT     CODE STATUS Full     DVT prophylaxis: Heparin  Ulcer prophylaxis: Protonix    Care Plan discussed with: Patient/Family and Nurse    Total time spent with patient: 33 minutes.

## 2021-01-17 NOTE — CONSULTS
PULMONARY NOTE  [facility]    Name: Wong Chilel MRN: [id number]   : 1955 Hospital: [facility]   Date: 2021  Admission date: 1/15/2021 Hospital Day: 3       HPI:     Hospital Problems  Date Reviewed: 1/15/2021          Codes Class Noted POA    Bilateral pneumonia ICD-10-CM: J18.9  ICD-9-CM: 796  1/15/2021 Yes        Acute respiratory failure with hypoxia [facility] ICD-10-CM: J96.01  ICD-9-CM: 518.81  1/15/2021 Yes        COVID-19 ICD-10-CM: U07.1  ICD-9-CM: 079.89  1/15/2021 Yes                   [x] High complexity decision making was performed  [x] See my orders for details      Subjective/Initial History:     I was asked by Roni Spencer MD to see Wong Chilel  a 77 y.o. African American male in consultation     Excerpts from admission 1/15/2021 or consult notes as follows:   61-year-old male came in because of shortness of breath and dyspnea nonproductive cough patient was tested positive for COVID-19 several days ago then he started having worsening of his symptoms of shortness of breath dyspnea cough chest x-ray shows bilateral infiltrate consistent with COVID-19 he was hypoxic he was put on oxygen via nasal cannula 4 L so now pulmonary consult was called for further evaluation.       No Known Allergies     MAR reviewed and pertinent medications noted or modified as needed     Current Facility-Administered Medications   Medication    zinc sulfate (ZINCATE) 220 (50) mg capsule 1 Cap    ascorbic acid (vitamin C) (VITAMIN C) tablet 500 mg    cholecalciferol (VITAMIN D3) tablet 5,000 Units    dexamethasone (DECADRON) 4 mg/mL injection 4 mg    allopurinoL (ZYLOPRIM) tablet 100 mg    amLODIPine (NORVASC) tablet 10 mg    [Held by provider] atenoloL (TENORMIN) tablet 50 mg    calcitRIOL (ROCALTROL) capsule 0.25 mcg    famotidine (PEPCID) tablet 20 mg    hydrALAZINE (APRESOLINE) tablet 100 mg    levothyroxine (SYNTHROID) tablet 50 mcg    sodium chloride (NS) flush 5-40 mL    sodium chloride (NS) flush 5-40 mL    acetaminophen (TYLENOL) tablet 650 mg    Or    acetaminophen (TYLENOL) suppository 650 mg    polyethylene glycol (MIRALAX) packet 17 g    promethazine (PHENERGAN) tablet 12.5 mg    Or    ondansetron (ZOFRAN) injection 4 mg    heparin (porcine) injection 5,000 Units    azithromycin (ZITHROMAX) 500 mg in 0.9% sodium chloride 250 mL (VIAL-MATE)    cefTRIAXone (ROCEPHIN) 1 g in sterile water (preservative free) 10 mL IV syringe    glucose chewable tablet 16 g    dextrose (D50W) injection syrg 12.5-25 g    glucagon (GLUCAGEN) injection 1 mg    insulin lispro (HUMALOG) injection      Patient PCP: Tonia Biggs DO  PMH:  has a past medical history of CKD (chronic kidney disease) stage 4, GFR 15-29 ml/min ([facility] [address] DM (diabetes mellitus) ([facility] [address] H/O cervical spine surgery, and HTN (hypertension). PSH:   has a past surgical history that includes hx orthopaedic. FHX: family history includes Coronary Artery Disease in his mother; Heart Failure in his mother; Pacemaker in his sister. SHX:  reports that he has never smoked. He has never used smokeless tobacco. He reports previous alcohol use. He reports previous drug use. ROS:    Review of Systems   Constitutional: Positive for malaise/fatigue. HENT: Negative. Eyes: Negative. Respiratory: Positive for cough and shortness of breath. Cardiovascular: Positive for orthopnea. Gastrointestinal: Negative. Genitourinary: Negative. Musculoskeletal: Negative. Neurological: Negative. Endo/Heme/Allergies: Negative. Psychiatric/Behavioral: Negative.          Objective:     Vital Signs: Telemetry:    normal sinus rhythm Intake/Output:   Visit Vitals  BP (!) 143/85 (BP 1 Location: Left arm, BP Patient Position: At rest)   Pulse 84   Temp (!) 96.3 °F (35.7 °C)   Resp 20   Ht 5' 7\" (1.702 m)   Wt 114.3 kg (252 lb)   SpO2 93%   BMI 39.47 kg/m²       Temp (24hrs), Av.3 °F (36.3 °C), Min:96.3 °F (35.7 °C), Max:97.8 °F (36.6 °C)        O2 Device: Nasal cannula O2 Flow Rate (L/min): 3 l/min       Wt Readings from Last 4 Encounters:   01/15/21 114.3 kg (252 lb)        No intake or output data in the 24 hours ending 01/17/21 1219    Last shift:      No intake/output data recorded. Last 3 shifts: 01/15 1901 - 01/17 0700  In: 1000 [I.V.:1000]  Out: -        Physical Exam:     Physical Exam   Constitutional: He appears distressed. HENT:   Head: Normocephalic and atraumatic. Eyes: Pupils are equal, round, and reactive to light. Conjunctivae are normal.   Neck: Normal range of motion. Neck supple. Cardiovascular: Normal rate and regular rhythm. Pulmonary/Chest: He is in respiratory distress. Abdominal: Soft. Bowel sounds are normal.   Musculoskeletal:         General: Edema present. Neurological: He is alert. Labs:    Recent Labs     01/17/21  1000 01/16/21  0101 01/15/21  1730 01/15/21  1715   WBC 8.0 5.2  --  6.5   HGB 9.5* 9.8*  --  10.5*    264  --  281   INR  --   --  1.3*  --    APTT  --   --  34.2  --      Recent Labs     01/17/21  1000 01/16/21  0101 01/15/21  1730 01/15/21  1715    134*  --  132*   K 3.5 3.3*  --  3.8    102  --  98   CO2 20* 18*  --  20*   * 157*  --  99   BUN 92* 101*  --  102*   CREA 4.15* 5.00*  --  5.37*   CA 8.3* 8.3*  --  8.8   MG  --  2.8* 2.8*  --    LAC  --   --  1.2  --    ALB  --   --   --  2.8*   ALT  --   --   --  27     Recent Labs     01/17/21  0420   PH 7.39   PCO2 29*   PO2 64*   HCO3 19*     Recent Labs     01/15/21  1730   TROIQ <0.05     No results found for: BNPP, BNP   Lab Results   Component Value Date/Time    Culture result: No growth 1 day 01/15/2021 05:30 PM     Lab Results   Component Value Date/Time    TSH 1.08 01/15/2021 05:30 PM       Imaging:    CXR Results  (Last 48 hours)               01/17/21 1020  XR CHEST PORT Final result    Impression:  Findings/impression:       Lungs are underinflated.  Scattered interstitial/hazy airspace disease, decreased   compared to prior examination. Small pleural effusions are suspected. No evidence of pneumothorax. Cardiomediastinal contours are stable. Visualized osseous structures are unchanged. Narrative:  Study: XR CHEST PORT       Clinical indication: covid       Comparison: Chest x-ray 1/15/2021.           01/15/21 1726  XR CHEST SNGL V Final result    Impression:  Impression: Airspace opacities in the lungs compatible with Covid-19 pneumonia. Narrative:  Chest, frontal view, 1/15/2021       History: Covid-19 positive. Comparison: Including chest 11/26/2015. Findings: There is surgical hardware in the cervical spine. The cardiac   silhouette is within normal limits. The lungs are underexpanded. There are   airspace opacities have been the lungs most pronounced in the perihilar regions   and bases compatible with Covid-19 pneumonia. Hydrostatic edema could also be   present. No pneumothorax is identified. Degenerative changes are present in   the thoracic spine. No results found for this or any previous visit. IMPRESSION:   1. Acute hypoxic respiratory failure  2. COVID-19 pneumonia  3. History of hypertension diabetes mellitus  Body mass index is 39.47 kg/m². 4. Acute on chronic kidney disease creatinine is 5 his GFR is 14 not a candidate for remdesivir  5. He recieved  ivermectin 12 mg 1 dose yesterday  6. Bradycardia resolved off beta-blocker  7. Pt is requiring Drug therapy requiring intensive monitoring for toxicity  8. Pt is unstable, unpredictable needing inpatient monitoring; is acutely ill and at high risk of sudden decline and decompensation with severe consequenses and continued end organ dysfunction and failure  9. Prognosis guarded       RECOMMENDATIONS/PLAN:     1.  Patient is on 3 L nasal Cannula oxygen as salvage oxygen delivery device to provide high concentration of oxygen to overcome refractory hypoxia;  2. Continue with Decadron Zithromax and Rocephin  3. Intubate and place on vent if NIV fails  4. Agree with Empiric IV antibiotics pending culture results   5. Follow culture results  6. Supplemental O2 to keep sats > 93%  7. Aspiration precautions  8. Labs to follow electrolytes, renal function and and blood counts  9. Glucose monitoring and SSI  10. Bronchial hygiene with respiratory therapy techniques, bronchodilators  11. DVT, SUP prophylaxis         This care involved high complexity medical decision making: I personally:  · Reviewed the flowsheet and previous days notes  · Reviewed and summarized records or history from previous days note or discussions with staff, family  · High Risk Drug therapy requiring intensive monitoring for toxicity: eg steroids, pressors, antibiotics  · Reviewed and/or ordered Clinical lab tests  · Reviewed images and/or ordered Radiology tests  · Reviewed the patients ECG / Telemetry  · Reviewed and/or adjusted NiPPV settings  · Called and arranged for Radiologic procedures or interventions  · performed or ordered Diagnostic endoscopies with identified risk factors.   · discussed my assessment/management with : Nursing, Hospitalist and Family for coordination of care  · Time spent in patient care 30 min          Jefferson Francisco MD

## 2021-01-18 LAB
ANION GAP SERPL CALC-SCNC: 12 MMOL/L (ref 5–15)
ATRIAL RATE: 78 BPM
BASOPHILS # BLD: 0 K/UL (ref 0–0.1)
BASOPHILS NFR BLD: 0 % (ref 0–1)
BUN SERPL-MCNC: 88 MG/DL (ref 6–20)
BUN/CREAT SERPL: 20 (ref 12–20)
CA-I BLD-MCNC: 8.4 MG/DL (ref 8.5–10.1)
CALCULATED P AXIS, ECG09: 60 DEGREES
CALCULATED R AXIS, ECG10: 49 DEGREES
CALCULATED T AXIS, ECG11: 68 DEGREES
CHLORIDE SERPL-SCNC: 106 MMOL/L (ref 97–108)
CO2 SERPL-SCNC: 19 MMOL/L (ref 21–32)
CREAT SERPL-MCNC: 4.31 MG/DL (ref 0.7–1.3)
DIAGNOSIS, 93000: NORMAL
DIFFERENTIAL METHOD BLD: ABNORMAL
EOSINOPHIL # BLD: 0 K/UL (ref 0–0.4)
EOSINOPHIL NFR BLD: 0 % (ref 0–7)
ERYTHROCYTE [DISTWIDTH] IN BLOOD BY AUTOMATED COUNT: 15.3 % (ref 11.5–14.5)
GLUCOSE BLD STRIP.AUTO-MCNC: 140 MG/DL (ref 65–100)
GLUCOSE BLD STRIP.AUTO-MCNC: 150 MG/DL (ref 65–100)
GLUCOSE BLD STRIP.AUTO-MCNC: 160 MG/DL (ref 65–100)
GLUCOSE BLD STRIP.AUTO-MCNC: 169 MG/DL (ref 65–100)
GLUCOSE BLD STRIP.AUTO-MCNC: 177 MG/DL (ref 65–100)
GLUCOSE SERPL-MCNC: 145 MG/DL (ref 65–100)
HCT VFR BLD AUTO: 29.8 % (ref 36.6–50.3)
HGB BLD-MCNC: 10.4 G/DL (ref 12.1–17)
IMM GRANULOCYTES # BLD AUTO: 0 K/UL
IMM GRANULOCYTES NFR BLD AUTO: 0 %
LYMPHOCYTES # BLD: 0.6 K/UL (ref 0.8–3.5)
LYMPHOCYTES NFR BLD: 5 % (ref 12–49)
MCH RBC QN AUTO: 26.9 PG (ref 26–34)
MCHC RBC AUTO-ENTMCNC: 34.9 G/DL (ref 30–36.5)
MCV RBC AUTO: 77 FL (ref 80–99)
MONOCYTES # BLD: 0.1 K/UL (ref 0–1)
MONOCYTES NFR BLD: 1 % (ref 5–13)
NEUTS BAND NFR BLD MANUAL: 2 % (ref 0–6)
NEUTS SEG # BLD: 11 K/UL (ref 1.8–8)
NEUTS SEG NFR BLD: 92 % (ref 32–75)
NRBC # BLD: 0.06 K/UL (ref 0–0.01)
NRBC BLD-RTO: 0.5 PER 100 WBC
P-R INTERVAL, ECG05: 158 MS
PERFORMED BY, TECHID: ABNORMAL
PLATELET # BLD AUTO: 409 K/UL (ref 150–400)
PMV BLD AUTO: 9.6 FL (ref 8.9–12.9)
POTASSIUM SERPL-SCNC: 3.6 MMOL/L (ref 3.5–5.1)
Q-T INTERVAL, ECG07: 408 MS
QRS DURATION, ECG06: 106 MS
QTC CALCULATION (BEZET), ECG08: 465 MS
RBC # BLD AUTO: 3.87 M/UL (ref 4.1–5.7)
RBC MORPH BLD: ABNORMAL
SODIUM SERPL-SCNC: 137 MMOL/L (ref 136–145)
VENTRICULAR RATE, ECG03: 78 BPM
WBC # BLD AUTO: 11.7 K/UL (ref 4.1–11.1)

## 2021-01-18 PROCEDURE — 82962 GLUCOSE BLOOD TEST: CPT

## 2021-01-18 PROCEDURE — 65270000029 HC RM PRIVATE

## 2021-01-18 PROCEDURE — 74011250636 HC RX REV CODE- 250/636: Performed by: FAMILY MEDICINE

## 2021-01-18 PROCEDURE — 97530 THERAPEUTIC ACTIVITIES: CPT

## 2021-01-18 PROCEDURE — 97165 OT EVAL LOW COMPLEX 30 MIN: CPT

## 2021-01-18 PROCEDURE — 80048 BASIC METABOLIC PNL TOTAL CA: CPT

## 2021-01-18 PROCEDURE — 94760 N-INVAS EAR/PLS OXIMETRY 1: CPT

## 2021-01-18 PROCEDURE — 97161 PT EVAL LOW COMPLEX 20 MIN: CPT

## 2021-01-18 PROCEDURE — 36415 COLL VENOUS BLD VENIPUNCTURE: CPT

## 2021-01-18 PROCEDURE — 77010033678 HC OXYGEN DAILY

## 2021-01-18 PROCEDURE — 85025 COMPLETE CBC W/AUTO DIFF WBC: CPT

## 2021-01-18 PROCEDURE — 74011250636 HC RX REV CODE- 250/636: Performed by: PHYSICIAN ASSISTANT

## 2021-01-18 PROCEDURE — 74011000250 HC RX REV CODE- 250: Performed by: FAMILY MEDICINE

## 2021-01-18 PROCEDURE — 74011250637 HC RX REV CODE- 250/637: Performed by: PHYSICIAN ASSISTANT

## 2021-01-18 RX ADMIN — LEVOTHYROXINE SODIUM 50 MCG: 0.03 TABLET ORAL at 05:52

## 2021-01-18 RX ADMIN — FAMOTIDINE 20 MG: 20 TABLET, FILM COATED ORAL at 19:38

## 2021-01-18 RX ADMIN — HEPARIN SODIUM 5000 UNITS: 5000 INJECTION INTRAVENOUS; SUBCUTANEOUS at 15:45

## 2021-01-18 RX ADMIN — OXYCODONE HYDROCHLORIDE AND ACETAMINOPHEN 500 MG: 500 TABLET ORAL at 10:13

## 2021-01-18 RX ADMIN — HYDRALAZINE HYDROCHLORIDE 100 MG: 50 TABLET, FILM COATED ORAL at 19:38

## 2021-01-18 RX ADMIN — CHOLECALCIFEROL TAB 125 MCG (5000 UNIT) 5000 UNITS: 125 TAB at 10:13

## 2021-01-18 RX ADMIN — Medication 1 CAPSULE: at 10:13

## 2021-01-18 RX ADMIN — ALLOPURINOL 100 MG: 100 TABLET ORAL at 10:13

## 2021-01-18 RX ADMIN — Medication 10 ML: at 00:41

## 2021-01-18 RX ADMIN — AMLODIPINE BESYLATE 10 MG: 5 TABLET ORAL at 10:13

## 2021-01-18 RX ADMIN — DEXAMETHASONE SODIUM PHOSPHATE 4 MG: 4 INJECTION, SOLUTION INTRA-ARTICULAR; INTRALESIONAL; INTRAMUSCULAR; INTRAVENOUS; SOFT TISSUE at 15:45

## 2021-01-18 RX ADMIN — CEFTRIAXONE SODIUM 1 G: 1 INJECTION, POWDER, FOR SOLUTION INTRAMUSCULAR; INTRAVENOUS at 20:00

## 2021-01-18 RX ADMIN — CALCITRIOL CAPSULES 0.25 MCG 0.25 MCG: 0.25 CAPSULE ORAL at 10:13

## 2021-01-18 RX ADMIN — FAMOTIDINE 20 MG: 20 TABLET, FILM COATED ORAL at 10:13

## 2021-01-18 RX ADMIN — AZITHROMYCIN DIHYDRATE 500 MG: 500 INJECTION, POWDER, LYOPHILIZED, FOR SOLUTION INTRAVENOUS at 19:37

## 2021-01-18 RX ADMIN — HEPARIN SODIUM 5000 UNITS: 5000 INJECTION INTRAVENOUS; SUBCUTANEOUS at 19:38

## 2021-01-18 RX ADMIN — HYDRALAZINE HYDROCHLORIDE 100 MG: 50 TABLET, FILM COATED ORAL at 15:45

## 2021-01-18 RX ADMIN — Medication 10 ML: at 06:02

## 2021-01-18 RX ADMIN — Medication 10 ML: at 15:46

## 2021-01-18 RX ADMIN — DEXAMETHASONE SODIUM PHOSPHATE 4 MG: 4 INJECTION, SOLUTION INTRA-ARTICULAR; INTRALESIONAL; INTRAMUSCULAR; INTRAVENOUS; SOFT TISSUE at 19:38

## 2021-01-18 RX ADMIN — HEPARIN SODIUM 5000 UNITS: 5000 INJECTION INTRAVENOUS; SUBCUTANEOUS at 05:52

## 2021-01-18 RX ADMIN — Medication 10 ML: at 19:50

## 2021-01-18 RX ADMIN — DEXAMETHASONE SODIUM PHOSPHATE 4 MG: 4 INJECTION, SOLUTION INTRA-ARTICULAR; INTRALESIONAL; INTRAMUSCULAR; INTRAVENOUS; SOFT TISSUE at 05:52

## 2021-01-18 RX ADMIN — HYDRALAZINE HYDROCHLORIDE 100 MG: 50 TABLET, FILM COATED ORAL at 10:13

## 2021-01-18 NOTE — PROGRESS NOTES
Medical Progress Note      NAME: Wong Chilel   :  1955  MRM:  [id number]    Date/Time: 2021  9:30 AM         Subjective:   I was asked by Roni Spencer MD to see Wong Chilel  a 77 y.o.    male in consultation      Excerpts from admission 1/15/2021 or consult notes as follows:   78-year-old male came in because of shortness of breath and dyspnea nonproductive cough patient was tested positive for COVID-19 several days ago then he started having worsening of his symptoms of shortness of breath dyspnea cough chest x-ray shows bilateral infiltrate consistent with COVID-19 he was hypoxic he was put on oxygen via nasal cannula 4 L so now pulmonary consult was called for further evaluation.        No Known Allergies      MAR reviewed and pertinent medications noted or modified as needed          Current Facility-Administered Medications   Medication    zinc sulfate (ZINCATE) 220 (50) mg capsule 1 Cap    ascorbic acid (vitamin C) (VITAMIN C) tablet 500 mg    cholecalciferol (VITAMIN D3) tablet 5,000 Units    dexamethasone (DECADRON) 4 mg/mL injection 4 mg    allopurinoL (ZYLOPRIM) tablet 100 mg    amLODIPine (NORVASC) tablet 10 mg    [Held by provider] atenoloL (TENORMIN) tablet 50 mg    calcitRIOL (ROCALTROL) capsule 0.25 mcg    famotidine (PEPCID) tablet 20 mg    hydrALAZINE (APRESOLINE) tablet 100 mg    levothyroxine (SYNTHROID) tablet 50 mcg    sodium chloride (NS) flush 5-40 mL    sodium chloride (NS) flush 5-40 mL    acetaminophen (TYLENOL) tablet 650 mg     Or    acetaminophen (TYLENOL) suppository 650 mg    polyethylene glycol (MIRALAX) packet 17 g    promethazine (PHENERGAN) tablet 12.5 mg     Or    ondansetron (ZOFRAN) injection 4 mg    heparin (porcine) injection 5,000 Units    azithromycin (ZITHROMAX) 500 mg in 0.9% sodium chloride 250 mL (VIAL-MATE)    cefTRIAXone (ROCEPHIN) 1 g in sterile water (preservative free) 10 mL IV syringe    glucose chewable tablet 16 g    dextrose (D50W) injection syrg 12.5-25 g    glucagon (GLUCAGEN) injection 1 mg    insulin lispro (HUMALOG) injection      Patient PCP: Zena Slade DO  PMH:  has a past medical history of CKD (chronic kidney disease) stage 4, GFR 15-29 ml/min ([facility] [address] DM (diabetes mellitus) ([facility] [address] H/O cervical spine surgery, and HTN (hypertension). PSH:   has a past surgical history that includes hx orthopaedic. FHX: family history includes Coronary Artery Disease in his mother; Heart Failure in his mother; Pacemaker in his sister. SHX:  reports that he has never smoked. He has never used smokeless tobacco. He reports previous alcohol use. He reports previous drug use. ROS:     Review of Systems   Constitutional: Positive for malaise/fatigue. HENT: Negative. Eyes: Negative. Respiratory: Positive for cough and shortness of breath. Cardiovascular: Positive for orthopnea. Gastrointestinal: Negative. Genitourinary: Negative. Musculoskeletal: Negative. Neurological: Negative. Endo/Heme/Allergies: Negative. Psychiatric/Behavioral: Negative. Objective:       Vitals:      Last 24hrs VS reviewed since prior progress note. Most recent are:    Visit Vitals  /74 (BP 1 Location: Left arm)   Pulse 86   Temp 97.4 °F (36.3 °C)   Resp 20   Ht 5' 7\" (1.702 m)   Wt 114.3 kg (252 lb)   SpO2 95%   BMI 39.47 kg/m²     SpO2 Readings from Last 6 Encounters:   01/18/21 95%    O2 Flow Rate (L/min): 3 l/min       Intake/Output Summary (Last 24 hours) at 1/18/2021 1054  Last data filed at 1/17/2021 1449  Gross per 24 hour   Intake    Output 200 ml   Net -200 ml          Exam:   Physical Exam   Constitutional: He appears distressed. HENT:   Head: Normocephalic and atraumatic. Eyes: Pupils are equal, round, and reactive to light. Conjunctivae are normal.   Neck: Normal range of motion. Neck supple. Cardiovascular: Normal rate and regular rhythm.    Pulmonary/Chest: He is in respiratory distress. Abdominal: Soft. Bowel sounds are normal.   Musculoskeletal:         General: Edema present. Neurological: He is alert.              Lab Data Reviewed: (see below)      Medications:  Current Facility-Administered Medications   Medication Dose Route Frequency    zinc sulfate (ZINCATE) 220 (50) mg capsule 1 Cap  1 Cap Oral DAILY    ascorbic acid (vitamin C) (VITAMIN C) tablet 500 mg  500 mg Oral DAILY    cholecalciferol (VITAMIN D3) tablet 5,000 Units  5,000 Units Oral DAILY    dexamethasone (DECADRON) 4 mg/mL injection 4 mg  4 mg IntraVENous Q8H    allopurinoL (ZYLOPRIM) tablet 100 mg  100 mg Oral DAILY    amLODIPine (NORVASC) tablet 10 mg  10 mg Oral DAILY    [Held by provider] atenoloL (TENORMIN) tablet 50 mg  50 mg Oral DAILY    calcitRIOL (ROCALTROL) capsule 0.25 mcg  0.25 mcg Oral DAILY    famotidine (PEPCID) tablet 20 mg  20 mg Oral BID    hydrALAZINE (APRESOLINE) tablet 100 mg  100 mg Oral TID    levothyroxine (SYNTHROID) tablet 50 mcg  50 mcg Oral ACB    sodium chloride (NS) flush 5-40 mL  5-40 mL IntraVENous Q8H    sodium chloride (NS) flush 5-40 mL  5-40 mL IntraVENous PRN    acetaminophen (TYLENOL) tablet 650 mg  650 mg Oral Q6H PRN    Or    acetaminophen (TYLENOL) suppository 650 mg  650 mg Rectal Q6H PRN    polyethylene glycol (MIRALAX) packet 17 g  17 g Oral DAILY PRN    promethazine (PHENERGAN) tablet 12.5 mg  12.5 mg Oral Q6H PRN    Or    ondansetron (ZOFRAN) injection 4 mg  4 mg IntraVENous Q6H PRN    heparin (porcine) injection 5,000 Units  5,000 Units SubCUTAneous Q8H    azithromycin (ZITHROMAX) 500 mg in 0.9% sodium chloride 250 mL (VIAL-MATE)  500 mg IntraVENous Q24H    cefTRIAXone (ROCEPHIN) 1 g in sterile water (preservative free) 10 mL IV syringe  1 g IntraVENous Q24H    glucose chewable tablet 16 g  4 Tab Oral PRN    dextrose (D50W) injection syrg 12.5-25 g  25-50 mL IntraVENous PRN    glucagon (GLUCAGEN) injection 1 mg  1 mg IntraMUSCular PRN    insulin lispro (HUMALOG) injection   SubCUTAneous AC&HS       ______________________________________________________________________      Lab Review:     Recent Labs     01/17/21  1000 01/16/21  0101 01/15/21  1715   WBC 8.0 5.2 6.5   HGB 9.5* 9.8* 10.5*   HCT 27.6* 27.3* 31.1*    264 281     Recent Labs     01/17/21  1000 01/16/21  0101 01/15/21  1730 01/15/21  1715    134*  --  132*   K 3.5 3.3*  --  3.8    102  --  98   CO2 20* 18*  --  20*   * 157*  --  99   BUN 92* 101*  --  102*   CREA 4.15* 5.00*  --  5.37*   CA 8.3* 8.3*  --  8.8   MG  --  2.8* 2.8*  --    ALB  --   --   --  2.8*   ALT  --   --   --  27   INR  --   --  1.3*  --      No components found for: [place]  Recent Labs     01/17/21  0420   PH 7.39   PCO2 29*   PO2 64*   HCO3 19*     Recent Labs     01/15/21  1730   INR 1.3*             IMPRESSION:   1. Acute hypoxic respiratory failure  2. COVID-19 pneumonia  3. History of hypertension diabetes mellitus  5. Body mass index is 39.47 kg/m². 4. Acute on chronic kidney disease creatinine is 5 his GFR is 14 not a candidate for remdesivir  5. He recieved ivermectin 12 mg 1 dose  6. Bradycardia resolved off beta-blocker  7. Pt is requiring Drug therapy requiring intensive monitoring for toxicity  8. Pt is unstable, unpredictable needing inpatient monitoring; is acutely ill and at high risk of sudden decline and decompensation with severe consequenses and continued end organ dysfunction and failure  9. Prognosis guarded        RECOMMENDATIONS/PLAN:     1. Patient is on 3 L nasal Cannula oxygen as salvage oxygen delivery device to provide high concentration of oxygen to overcome refractory hypoxia;  2. Continue with Decadron Zithromax and Rocephin. Patient received 1 dose of 12mg ivermectin. 3. Intubate and place on vent if NIV fails  4. Renal function improving slightly, will continue to monitor  5. Agree with Empiric IV antibiotics pending culture results   6.  Follow culture results, no preliminary growth  7. Supplemental O2 to keep sats > 93%  8. Aspiration precautions  9. Labs to follow electrolytes, renal function and and blood counts  10. Glucose monitoring and SSI  11. Bronchial hygiene with respiratory therapy techniques, bronchodilators  12.  DVT, SUP prophylaxis

## 2021-01-18 NOTE — PROGRESS NOTES
Hospitalist Progress Note    Subjective:   Daily Progress Note: 1/18/2021 8:26 AM    Hospital Course: Patient is a 68-year-old black male with a history of type 2 diabetes, hypertension, CKD stage IV presented to the ER on 1/15/2021 for generalized weakness, nonproductive cough, shortness of breath. Patient reports that he tested positive for Covid several days ago. In the last 24 hours he had decreased oral intake, loss of appetite, worsening shortness of breath, subjective fever and chills. In the ED patient's oxygen saturation was in the 80s while ambulating and therefore was placed on supplemental oxygen. Chest x-ray showed signs concerning for bilateral pneumonia suspicious for COVID-19. He was given 1 L of hydration with 1 dose of Decadron. D-dimer elevated 2.69. , lactic acid 1.2, procalcitonin 0.28, C-reactive protein 13.50. Patient continues on supplemental oxygen. Patient is on IV Decadron, azithromycin, Rocephin. on zinc, vitamin C, vitamin D. Pulmonary Consulted. Subjective: Patient denies any SOB, chest pain. Does have a cough, nonproductive.      Current Facility-Administered Medications   Medication Dose Route Frequency    zinc sulfate (ZINCATE) 220 (50) mg capsule 1 Cap  1 Cap Oral DAILY    ascorbic acid (vitamin C) (VITAMIN C) tablet 500 mg  500 mg Oral DAILY    cholecalciferol (VITAMIN D3) tablet 5,000 Units  5,000 Units Oral DAILY    dexamethasone (DECADRON) 4 mg/mL injection 4 mg  4 mg IntraVENous Q8H    allopurinoL (ZYLOPRIM) tablet 100 mg  100 mg Oral DAILY    amLODIPine (NORVASC) tablet 10 mg  10 mg Oral DAILY    [Held by provider] atenoloL (TENORMIN) tablet 50 mg  50 mg Oral DAILY    calcitRIOL (ROCALTROL) capsule 0.25 mcg  0.25 mcg Oral DAILY    famotidine (PEPCID) tablet 20 mg  20 mg Oral BID    hydrALAZINE (APRESOLINE) tablet 100 mg  100 mg Oral TID    levothyroxine (SYNTHROID) tablet 50 mcg  50 mcg Oral ACB    sodium chloride (NS) flush 5-40 mL  5-40 mL IntraVENous Q8H    sodium chloride (NS) flush 5-40 mL  5-40 mL IntraVENous PRN    acetaminophen (TYLENOL) tablet 650 mg  650 mg Oral Q6H PRN    Or    acetaminophen (TYLENOL) suppository 650 mg  650 mg Rectal Q6H PRN    polyethylene glycol (MIRALAX) packet 17 g  17 g Oral DAILY PRN    promethazine (PHENERGAN) tablet 12.5 mg  12.5 mg Oral Q6H PRN    Or    ondansetron (ZOFRAN) injection 4 mg  4 mg IntraVENous Q6H PRN    heparin (porcine) injection 5,000 Units  5,000 Units SubCUTAneous Q8H    azithromycin (ZITHROMAX) 500 mg in 0.9% sodium chloride 250 mL (VIAL-MATE)  500 mg IntraVENous Q24H    cefTRIAXone (ROCEPHIN) 1 g in sterile water (preservative free) 10 mL IV syringe  1 g IntraVENous Q24H    glucose chewable tablet 16 g  4 Tab Oral PRN    dextrose (D50W) injection syrg 12.5-25 g  25-50 mL IntraVENous PRN    glucagon (GLUCAGEN) injection 1 mg  1 mg IntraMUSCular PRN    insulin lispro (HUMALOG) injection   SubCUTAneous AC&HS        Review of Systems  Constitutional: No fevers, No chills, No sweats, No fatigue, +Weakness  Eyes: No redness  Ears, nose, mouth, throat, and face: No nasal congestion, No sore throat, No voice change  Respiratory: No Shortness of Breath, ++ cough, No wheezing  Cardiovascular: No chest pain, No palpitations, No extremity edema  Gastrointestinal: No nausea, No vomiting, No diarrhea, No abdominal pain  Genitourinary: No frequency, No dysuria, No hematuria  Integument/breast: No skin lesion(s)   Neurological: No Confusion, No headaches, No dizziness      Objective:     Visit Vitals  BP (!) 141/90   Pulse 84   Temp 97 °F (36.1 °C)   Resp 20   Ht 5' 7\" (1.702 m)   Wt 114.3 kg (252 lb)   SpO2 96%   BMI 39.47 kg/m²    O2 Flow Rate (L/min): 3 l/min O2 Device: Nasal cannula    Temp (24hrs), Av.9 °F (36.1 °C), Min:96.3 °F (35.7 °C), Max:97.4 °F (36.3 °C)      No intake/output data recorded.    1901 -  0700  In: -   Out: 200 [Urine:200]    PHYSICAL EXAM:  Constitutional: No acute distress  Skin: Extremities and face reveal no rashes. HEENT: Sclerae anicteric. Extra-occular muscles are intact. No oral ulcers  Cardiovascular: RRR   Respiratory:  Nonlabored, diminished  GI: Abdomen nondistended, soft, and nontender. Normal active bowel sounds. Musculoskeletal: No pitting edema of the lower legs. Able to move all ext  Neurological:  Patient is alert and oriented. Cranial nerves II-XII grossly intact  Psychiatric: Mood appears appropriate       Data Review    Recent Results (from the past 24 hour(s))   GLUCOSE, POC    Collection Time: 01/17/21  9:03 AM   Result Value Ref Range    Glucose (POC) 150 (H) 65 - 100 mg/dL    Performed by ADE KAY    METABOLIC PANEL, BASIC    Collection Time: 01/17/21 10:00 AM   Result Value Ref Range    Sodium 138 136 - 145 mmol/L    Potassium 3.5 3.5 - 5.1 mmol/L    Chloride 107 97 - 108 mmol/L    CO2 20 (L) 21 - 32 mmol/L    Anion gap 11 5 - 15 mmol/L    Glucose 154 (H) 65 - 100 mg/dL    BUN 92 (H) 6 - 20 mg/dL    Creatinine 4.15 (H) 0.70 - 1.30 mg/dL    BUN/Creatinine ratio 22 (H) 12 - 20      GFR est AA 17 (L) >60 ml/min/1.73m2    GFR est non-AA 14 (L) >60 ml/min/1.73m2    Calcium 8.3 (L) 8.5 - 10.1 mg/dL   CBC WITH AUTOMATED DIFF    Collection Time: 01/17/21 10:00 AM   Result Value Ref Range    WBC 8.0 4.1 - 11.1 K/uL    RBC 3.57 (L) 4.10 - 5.70 M/uL    HGB 9.5 (L) 12.1 - 17.0 g/dL    HCT 27.6 (L) 36.6 - 50.3 %    MCV 77.3 (L) 80.0 - 99.0 FL    MCH 26.6 26.0 - 34.0 PG    MCHC 34.4 30.0 - 36.5 g/dL    RDW 15.2 (H) 11.5 - 14.5 %    PLATELET 502 271 - 767 K/uL    MPV 10.3 8.9 - 12.9 FL    NEUTROPHILS 91 (H) 32 - 75 %    LYMPHOCYTES 4 (L) 12 - 49 %    MONOCYTES 4 (L) 5 - 13 %    EOSINOPHILS 0 0 - 7 %    BASOPHILS 0 0 - 1 %    IMMATURE GRANULOCYTES 1 (H) 0.0 - 0.5 %    ABS. NEUTROPHILS 7.4 1.8 - 8.0 K/UL    ABS. LYMPHOCYTES 0.4 (L) 0.8 - 3.5 K/UL    ABS. MONOCYTES 0.3 0.0 - 1.0 K/UL    ABS. EOSINOPHILS 0.0 0.0 - 0.4 K/UL    ABS.  BASOPHILS 0.0 0.0 - 0.1 K/UL    ABS. IMM. GRANS. 0.1 (H) 0.00 - 0.04 K/UL    DF AUTOMATED     C REACTIVE PROTEIN, QT    Collection Time: 01/17/21 10:00 AM   Result Value Ref Range    C-Reactive protein 6.52 (H) 0.00 - 0.60 mg/dL   PROCALCITONIN    Collection Time: 01/17/21 10:00 AM   Result Value Ref Range    Procalcitonin 0.11 (H) 0 ng/mL   GLUCOSE, POC    Collection Time: 01/17/21 11:23 AM   Result Value Ref Range    Glucose (POC) 174 (H) 65 - 100 mg/dL    Performed by ADE KAY    GLUCOSE, POC    Collection Time: 01/17/21  4:27 PM   Result Value Ref Range    Glucose (POC) 153 (H) 65 - 100 mg/dL    Performed by [address] [place] [address] 434, POC    Collection Time: 01/17/21  8:29 PM   Result Value Ref Range    Glucose (POC) 152 (H) 65 - 100 mg/dL    Performed by Jagdeep Orr    GLUCOSE, POC    Collection Time: 01/17/21 10:40 PM   Result Value Ref Range    Glucose (POC) 156 (H) 65 - 100 mg/dL    Performed by Rufino. Lorraine [address]        Radiology review: Chest xray    Assessment:   1. Bilateral pneumonia concerning for COVID-19  2. Type 2 diabetes  3. Hypertension  4. Obesity  5. CKD stage IV  6. Hypothyroidism. Plan:    1. Oxygen saturation within normal limits on 4L. Tried to wean yesterday but oxygen levels in the low 90s. He does not use oxygen at home. He has been afebrile. WBC within normal limits. D-dimer 2.69. , procalcitonin 0.28 --> 0.11, lactic acid 1.2, C-reactive protein 13.50 --> 6.52. He is on IV Rocephin and azithromycin,  IV Decadron. On zinc, vitamin C, vitamin D. Consult pulmonology. Per pulmonology due to renal function patient is not a candidate for remdesivir. 2.  Blood glucose level stable. Continue with insulin sliding scale. May need to make further adjustments as he is on IV steroids. 3.  BP stable. Holding atenolol due to bradycardia. On norvasc and hydralzine. 4.  Renal function pending for today. We will continue to monitor. May consider nephology consult if worsens. 5.  On synthroid. TSH  6.  CBC and BMP in the AM   7. PT/OT     CODE STATUS Full     DVT prophylaxis: Heparin  Ulcer prophylaxis: Protonix    Care Plan discussed with: Patient/Family and Nurse    Total time spent with patient: 33 minutes.

## 2021-01-18 NOTE — PROGRESS NOTES
PHYSICAL THERAPY EVALUATION  Patient: Vaibhav Severino (65 y.o. male)  Date: 1/18/2021  Primary Diagnosis: COVID-19 [U07.1]  Bilateral pneumonia [J18.9]  Acute respiratory failure with hypoxia (HCC) [J96.01]        Precautions: droplet plus, falls        ASSESSMENT  Based on the objective data described below, the patient presents with impaired standing balance using RW for mobility, generalized deconditioning, decreased activity tolerance, SOB with exertion, no O2 use at baseline currently on 4L via nasal cannula, increased need for A with self care and functional mobility/transfer. Patient semi supine in bed upon PT/OT arrival and agreeable to working with therapy. Patient A&O x4 and per pt report, pt lives in a ground floor apartment in a senior living center with no HIRAL. Patient reports using a RW for ambulation since cervi 3cal surgery this past year, reports being independent for household ADLs/IADLs and mod I for mobility. Patient SBA rolling, CGA sup -> sit, SBA scooting EOB, CGA sit <> stand transfers. Pt amb 10 feet from bed to bedside chair with RW, gt belt, and CGA demonstrating short, shuffling step through gt pattern with no LOB or knee buckling noted. Patient would benefit from continued skilled PT services to address above deficits and improve safety and independence with amb and functional mobility/transfers. Recommend discharge to home with HHPT and family assist if able, when medically appropriate.     Current Level of Function Impacting Discharge: SBA to CGA     Other factors to consider for discharge: time since onset, PLOF, COVID +, new O2 use     PLAN :  Recommendations and Planned Interventions: bed mobility training, transfer training, gait training, therapeutic exercises, patient and family training/education and therapeutic activities      Frequency/Duration: Patient will be followed by physical therapy:  5 times a week to address goals.    Recommendation for discharge: (in order for the  patient to meet his/her long term goals)  HHPT with family A    This discharge recommendation:  Has been made in collaboration with the attending provider and/or case management    IF patient discharges home will need the following DME: none         SUBJECTIVE:   Patient stated i'm doing ok.     OBJECTIVE DATA SUMMARY:   HISTORY:    Past Medical History:   Diagnosis Date    CKD (chronic kidney disease) stage 4, [facility] 15-29 ml/min [facility])     DM [facility]di[facility] mellitus) [facility] [address]     H/O cervical spine surgery     HTN (hypertension)      Past Surgical History:   Procedure Laterality Date    HX ORTHOPAEDIC      Spine Surgery         Home Situation  Home Environment: Apartment(senior living)  # Steps to Enter: 0  One/Two Story Residence: One story  Living Alone: Yes  Support Systems: Family member(s)  Patient Expects to be Discharged to[de-identified] Private residence  Current DME Used/Available at Home: Walker, rolling, Shower chair, Grab bars    EXAMINATION/PRESENTATION/DECISION MAKING:   Critical Behavior:  Neurologic State: Alert  Orientation Level: Oriented X4  Cognition: Appropriate decision making  Safety/Judgement: Good awareness of safety precautions  Hearing: Auditory  Auditory Impairment: Hard of hearing, bilateral  Skin:  Intact where visible   Edema: none noted   Range Of Motion:  AROM: Generally decreased, functional           PROM: Generally decreased, functional           Strength:    Strength: Generally decreased, functional                    Tone & Sensation:                                  Coordination:     Vision:      Functional Mobility:  Bed Mobility:  Rolling: Stand-by assistance; Additional time  Supine to Sit: Contact guard assistance; Additional time     Scooting: Stand-by assistance  Transfers:  Sit to Stand: Contact guard assistance  Stand to Sit: Contact guard assistance        Bed to Chair: Contact guard assistance              Balance:   Sitting: Intact; With support  Standing: Intact; With support  Ambulation/Gait Training:  Distance (ft): 10 Feet (ft)(bed to bedside chair)  Assistive Device: Gait belt;Walker, rolling  Ambulation - Level of Assistance: Contact guard assistance                 Base of Support: Widened     Speed/Venessa: Shuffled; Slow         Therapeutic Exercises:   Not completed this session    Functional Measure:  [facility] AM-PAC 6 Clicks         Basic Mobility Inpatient Short Form  How much difficulty does the patient currently have. .. Unable A Lot A Little None   1. Turning over in bed (including adjusting bedclothes, sheets and blankets)? [] 1   [] 2   [x] 3   [] 4   2. Sitting down on and standing up from a chair with arms ( e.g., wheelchair, bedside commode, etc.)   [] 1   [] 2   [x] 3   [] 4   3. Moving from lying on back to sitting on the side of the bed? [] 1   [] 2   [x] 3   [] 4          How much help from another person does the patient currently need. .. Total A Lot A Little None   4. Moving to and from a bed to a chair (including a wheelchair)? [] 1   [] 2   [x] 3   [] 4   5. Need to walk in hospital room? [] 1   [] 2   [x] 3   [] 4   6. Climbing 3-5 steps with a railing? [] 1   [] 2   [x] 3   [] 4   © 2007, Trustees of [facility], under license to [employer]. All rights reserved     Score:  Initial: 18/24 Most Recent: X (Date: 1/18/2021 )   Interpretation of Tool:  Represents activities that are increasingly more difficult (i.e. Bed mobility, Transfers, Gait).   Score 24 23 22-20 19-15 14-10 9-7 6   Modifier CH CI CJ CK CL CM CN         Physical Therapy Evaluation Charge Determination   History Examination Presentation Decision-Making   HIGH Complexity :3+ comorbidities / personal factors will impact the outcome/ POC  HIGH Complexity : 4+ Standardized tests and measures addressing body structure, function, activity limitation and / or participation in recreation  LOW Complexity : Stable, uncomplicated  Other outcome measures [facility] 6 mod      Based on the above components, the patient evaluation is determined to be of the following complexity level: MEDIUM    Pain Ratin/10    Activity Tolerance:   Fair and desaturates with exertion and requires oxygen    After treatment patient left in no apparent distress:   Sitting in chair and Call bell within reach and nsg updated. GOALS:    Problem: Mobility Impaired (Adult and Pediatric)  Goal: *Acute Goals and Plan of Care (Insert Text)  Description: Pt will be I with LE HEP in 7 days. Pt will perform bed mobility with mod I in 7 days. Pt will perform transfers with mod I in 7 days. Pt will amb  feet with LRAD safely with mod I in 7 days. Outcome: Not Met       COMMUNICATION/EDUCATION:   The patients plan of care was discussed with: Occupational therapist and Registered nurse. Fall prevention education was provided and the patient/caregiver indicated understanding., Patient/family have participated as able in goal setting and plan of care. , and Patient/family agree to work toward stated goals and plan of care. PT/OT sessions occurred together for increased safety of pt and clinician.       Thank you for this referral.  Trevor Kenny, PT, DPT   Time Calculation: 25 mins

## 2021-01-18 NOTE — PROGRESS NOTES
Medical Progress Note      NAME: Juwan Benton   :  1955  MRM:  [id number]    Date/Time: 2021  9:30 AM         Subjective:   I was asked by Leia Dubose MD to see Juwan Benton  a 77 y.o.    male in consultation      Excerpts from admission 1/15/2021 or consult notes as follows:   49-year-old male came in because of shortness of breath and dyspnea nonproductive cough patient was tested positive for COVID-19 several days ago then he started having worsening of his symptoms of shortness of breath dyspnea cough chest x-ray shows bilateral infiltrate consistent with COVID-19 he was hypoxic he was put on oxygen via nasal cannula 4 L so now pulmonary consult was called for further evaluation.        No Known Allergies      MAR reviewed and pertinent medications noted or modified as needed          Current Facility-Administered Medications   Medication    zinc sulfate (ZINCATE) 220 (50) mg capsule 1 Cap    ascorbic acid (vitamin C) (VITAMIN C) tablet 500 mg    cholecalciferol (VITAMIN D3) tablet 5,000 Units    dexamethasone (DECADRON) 4 mg/mL injection 4 mg    allopurinoL (ZYLOPRIM) tablet 100 mg    amLODIPine (NORVASC) tablet 10 mg    [Held by provider] atenoloL (TENORMIN) tablet 50 mg    calcitRIOL (ROCALTROL) capsule 0.25 mcg    famotidine (PEPCID) tablet 20 mg    hydrALAZINE (APRESOLINE) tablet 100 mg    levothyroxine (SYNTHROID) tablet 50 mcg    sodium chloride (NS) flush 5-40 mL    sodium chloride (NS) flush 5-40 mL    acetaminophen (TYLENOL) tablet 650 mg     Or    acetaminophen (TYLENOL) suppository 650 mg    polyethylene glycol (MIRALAX) packet 17 g    promethazine (PHENERGAN) tablet 12.5 mg     Or    ondansetron (ZOFRAN) injection 4 mg    heparin (porcine) injection 5,000 Units    azithromycin (ZITHROMAX) 500 mg in 0.9% sodium chloride 250 mL (VIAL-MATE)    cefTRIAXone (ROCEPHIN) 1 g in sterile water (preservative free) 10 mL IV syringe    glucose chewable tablet 16 g    dextrose (D50W) injection syrg 12.5-25 g    glucagon (GLUCAGEN) injection 1 mg    insulin lispro (HUMALOG) injection      Patient PCP: Cecilia Yanez DO  PMH:  has a past medical history of CKD (chronic kidney disease) stage 4, GFR 15-29 ml/min ([facility] [address] DM (diabetes mellitus) ([facility] [address] H/O cervical spine surgery, and HTN (hypertension). PSH:   has a past surgical history that includes hx orthopaedic. FHX: family history includes Coronary Artery Disease in his mother; Heart Failure in his mother; Pacemaker in his sister. SHX:  reports that he has never smoked. He has never used smokeless tobacco. He reports previous alcohol use. He reports previous drug use. ROS:     Review of Systems   Constitutional: Positive for malaise/fatigue. HENT: Negative. Eyes: Negative. Respiratory: Positive for cough and shortness of breath. Cardiovascular: Positive for orthopnea. Gastrointestinal: Negative. Genitourinary: Negative. Musculoskeletal: Negative. Neurological: Negative. Endo/Heme/Allergies: Negative. Psychiatric/Behavioral: Negative. Objective:       Vitals:      Last 24hrs VS reviewed since prior progress note. Most recent are:    Visit Vitals  BP (!) 141/90   Pulse 84   Temp 97 °F (36.1 °C)   Resp 20   Ht 5' 7\" (1.702 m)   Wt 252 lb (114.3 kg)   SpO2 96%   BMI 39.47 kg/m²     SpO2 Readings from Last 6 Encounters:   01/18/21 96%    O2 Flow Rate (L/min): 3 l/min       Intake/Output Summary (Last 24 hours) at 1/18/2021 0930  Last data filed at 1/17/2021 1449  Gross per 24 hour   Intake    Output 200 ml   Net -200 ml          Exam:   Physical Exam   Constitutional: He appears distressed. HENT:   Head: Normocephalic and atraumatic. Eyes: Pupils are equal, round, and reactive to light. Conjunctivae are normal.   Neck: Normal range of motion. Neck supple. Cardiovascular: Normal rate and regular rhythm. Pulmonary/Chest: He is in respiratory distress. Abdominal: Soft. Bowel sounds are normal.   Musculoskeletal:         General: Edema present. Neurological: He is alert.              Lab Data Reviewed: (see below)      Medications:  Current Facility-Administered Medications   Medication Dose Route Frequency    zinc sulfate (ZINCATE) 220 (50) mg capsule 1 Cap  1 Cap Oral DAILY    ascorbic acid (vitamin C) (VITAMIN C) tablet 500 mg  500 mg Oral DAILY    cholecalciferol (VITAMIN D3) tablet 5,000 Units  5,000 Units Oral DAILY    dexamethasone (DECADRON) 4 mg/mL injection 4 mg  4 mg IntraVENous Q8H    allopurinoL (ZYLOPRIM) tablet 100 mg  100 mg Oral DAILY    amLODIPine (NORVASC) tablet 10 mg  10 mg Oral DAILY    [Held by provider] atenoloL (TENORMIN) tablet 50 mg  50 mg Oral DAILY    calcitRIOL (ROCALTROL) capsule 0.25 mcg  0.25 mcg Oral DAILY    famotidine (PEPCID) tablet 20 mg  20 mg Oral BID    hydrALAZINE (APRESOLINE) tablet 100 mg  100 mg Oral TID    levothyroxine (SYNTHROID) tablet 50 mcg  50 mcg Oral ACB    sodium chloride (NS) flush 5-40 mL  5-40 mL IntraVENous Q8H    sodium chloride (NS) flush 5-40 mL  5-40 mL IntraVENous PRN    acetaminophen (TYLENOL) tablet 650 mg  650 mg Oral Q6H PRN    Or    acetaminophen (TYLENOL) suppository 650 mg  650 mg Rectal Q6H PRN    polyethylene glycol (MIRALAX) packet 17 g  17 g Oral DAILY PRN    promethazine (PHENERGAN) tablet 12.5 mg  12.5 mg Oral Q6H PRN    Or    ondansetron (ZOFRAN) injection 4 mg  4 mg IntraVENous Q6H PRN    heparin (porcine) injection 5,000 Units  5,000 Units SubCUTAneous Q8H    azithromycin (ZITHROMAX) 500 mg in 0.9% sodium chloride 250 mL (VIAL-MATE)  500 mg IntraVENous Q24H    cefTRIAXone (ROCEPHIN) 1 g in sterile water (preservative free) 10 mL IV syringe  1 g IntraVENous Q24H    glucose chewable tablet 16 g  4 Tab Oral PRN    dextrose (D50W) injection syrg 12.5-25 g  25-50 mL IntraVENous PRN    glucagon (GLUCAGEN) injection 1 mg  1 mg IntraMUSCular PRN    insulin lispro (HUMALOG) injection   SubCUTAneous AC&HS       ______________________________________________________________________      Lab Review:     Recent Labs     01/17/21  1000 01/16/21  0101 01/15/21  1715   WBC 8.0 5.2 6.5   HGB 9.5* 9.8* 10.5*   HCT 27.6* 27.3* 31.1*    264 281     Recent Labs     01/17/21  1000 01/16/21  0101 01/15/21  1730 01/15/21  1715    134*  --  132*   K 3.5 3.3*  --  3.8    102  --  98   CO2 20* 18*  --  20*   * 157*  --  99   BUN 92* 101*  --  102*   CREA 4.15* 5.00*  --  5.37*   CA 8.3* 8.3*  --  8.8   MG  --  2.8* 2.8*  --    ALB  --   --   --  2.8*   ALT  --   --   --  27   INR  --   --  1.3*  --      No components found for: [place]  Recent Labs     01/17/21  0420   PH 7.39   PCO2 29*   PO2 64*   HCO3 19*     Recent Labs     01/15/21  1730   INR 1.3*             IMPRESSION:   1. Acute hypoxic respiratory failure  2. COVID-19 pneumonia  3. History of hypertension diabetes mellitus  5. Body mass index is 39.47 kg/m². 4. Acute on chronic kidney disease creatinine is 5 his GFR is 14 not a candidate for remdesivir  5. He recieved ivermectin 12 mg 1 dose  6. Bradycardia resolved off beta-blocker  7. Pt is requiring Drug therapy requiring intensive monitoring for toxicity  8. Pt is unstable, unpredictable needing inpatient monitoring; is acutely ill and at high risk of sudden decline and decompensation with severe consequenses and continued end organ dysfunction and failure  9. Prognosis guarded        RECOMMENDATIONS/PLAN:     1. Patient is on 3 L nasal Cannula oxygen as salvage oxygen delivery device to provide high concentration of oxygen to overcome refractory hypoxia;  2. Continue with Decadron Zithromax and Rocephin. Patient received 1 dose of 12mg ivermectin. 3. Intubate and place on vent if NIV fails  4. Renal function improving slightly, will continue to monitor  5. Agree with Empiric IV antibiotics pending culture results   6.  Follow culture results, no preliminary growth  7. Supplemental O2 to keep sats > 93%  8. Aspiration precautions  9. Labs to follow electrolytes, renal function and and blood counts  10. Glucose monitoring and SSI  11. Bronchial hygiene with respiratory therapy techniques, bronchodilators  12.  DVT, SUP prophylaxis

## 2021-01-18 NOTE — PROGRESS NOTES
OCCUPATIONAL THERAPY EVALUATION  Patient: Johann Bobby (62 y.o. male)  Date: 1/18/2021  Primary Diagnosis: COVID-19 [U07.1]  Bilateral pneumonia [J18.9]  Acute respiratory failure with hypoxia (HCC) [J96.01]        Precautions: COVID +, falls    ASSESSMENT  Based on the objective data described below, the patient presents with impaired standing balance using RW for mobility, generalized deconditioning, decreased activity tolerance, SOB with exertion, no O2 use at baseline currently on 4L via nasal cannula, increased need for A with self care and functional mobility/transfer. Patient semi supine in bed upon OT arrival and agreeable to working with therapy. Patient A&O x4 and per pt report, pt lives in a ground floor apartment in a senior living center with no HIRAL. Patient reports using a RW for ambulation since cervical surgery this past year, reports being independent for household ADLs/IADLs and mod I for mobility. Patient SBA rolling, CGA sup -> sit, SBA scooting EOB, CGA sit <> stand and CGA bed to chair transfer. Patient total A LE dressing to don socks EOB, once sitting in chair patient setup A grooming to wash face and brush teeth. Patient would benefit from continued skilled OT services to address above deficits and improve safety and independence with self care and functional mobility/transfers. Recommend discharge to home with [facility] and family assist if able, when medically appropriate. Current Level of Function Impacting Discharge (ADLs/self-care): setup A grooming, total A LE dressing.     Other factors to consider for discharge: time since onset, PLOF, COVID +, new O2 use        PLAN :  Recommendations and Planned Interventions: self care training, functional mobility training, therapeutic exercise, balance training, therapeutic activities, endurance activities, patient education, home safety training, and family training/education    Frequency/Duration: Patient will be followed by occupational therapy 4 times a week to address goals. Recommendation for discharge: (in order for the patient to meet his/her long term goals)  HHOT    This discharge recommendation:  Has been made in collaboration with the attending provider and/or case management    IF patient discharges home will need the following DME: patient owns DME required for discharge       SUBJECTIVE:   Patient stated i'm feeling better.     OBJECTIVE DATA SUMMARY:   HISTORY:   Past Medical History:   Diagnosis Date    CKD (chronic kidney disease) stage 4, GFR 15-29 ml/min ([facility])     DM (diabetes mellitus) ([facility] [address]     H/O cervical spine surgery     HTN (hypertension)      Past Surgical History:   Procedure Laterality Date    HX ORTHOPAEDIC      Spine Surgery       Expanded or extensive additional review of patient history:     Home Situation  Home Environment: Apartment(senior living)  # Steps to Enter: 0  One/Two Story Residence: One story  Living Alone: Yes  Support Systems: Family member(s)  Patient Expects to be Discharged to[de-identified] Private residence  Current DME Used/Available at Home: Walker, rolling, Shower chair, Grab bars    PLOF: Pt I for ADLS/IADLS, mod I with mobility prior to admission. Hand dominance: Right    EXAMINATION OF PERFORMANCE DEFICITS:  Cognitive/Behavioral Status:  Neurologic State: Alert  Orientation Level: Oriented X4  Cognition: Appropriate decision making  Safety/Judgement: Good awareness of safety precautions    Skin: intact where visible    Edema: none noted    Hearing: Auditory  Auditory Impairment: Hard of hearing, bilateral    Vision/Perceptual:    No c/o visual deficits at time of evaluation    Range of Motion:  Generally decreased, functional    Strength:  RUE Strength  Observation: grossly observed to be 3+/5     LUE Strength  Observation: grossly observed to be 3+/5    Coordination:  Generally intact    Tone & Sensation:  Tone: normal    Balance:  Sitting: Intact; With support  Standing: Intact; With support    Functional Mobility and Transfers for ADLs:  Bed Mobility:  Rolling: Stand-by assistance; Additional time  Supine to Sit: Contact guard assistance; Additional time  Scooting: Stand-by assistance    Transfers:  Sit to Stand: Contact guard assistance  Stand to Sit: Contact guard assistance  Bed to Chair: Contact guard assistance  Toilet Transfer : Contact guard assistance(simulated)    ADL Assessment:    Oral Facial Hygiene/Grooming: Setup    Lower Body Dressing: Total assistance    ADL Intervention and task modifications:    Grooming  Grooming Assistance: Set-up  Position Performed: Seated in chair  Washing Face: Set-up  Brushing Teeth: Set-up    Lower Body Dressing Assistance  Dressing Assistance: Total assistance(dependent)  Socks: Total assistance (dependent)  Position Performed: Seated edge of bed    Cognitive Retraining  Safety/Judgement: Good awareness of safety precautions    Therapeutic Exercise:  Patient may benefit from UE HEP to be initiated at next session as able     Functional Measure:    [address] [place] [id number] AM-PAC \"6 Clicks\"                                                       Daily Activity Inpatient Short Form  How much help from another person does the patient currently need. .. Total; A Lot A Little None   1. Putting on and taking off regular lower body clothing? [x]  1 [x]  2 []  3 []  4   2. Bathing (including washing, rinsing, drying)? []  1 [x]  2 []  3 []  4   3. Toileting, which includes using toilet, bedpan or urinal? [] 1 []  2 [x]  3 []  4   4. Putting on and taking off regular upper body clothing? []  1 []  2 [x]  3 []  4   5. Taking care of personal grooming such as brushing teeth? []  1 []  2 [x]  3 []  4   6. Eating meals? []  1 []  2 [x]  3 []  4   © 2007, Trustees of [address] [place] [address] [address] [id number], under license to [employer].  All rights reserved     Score: 16/24     Interpretation of Tool:  Represents clinically-significant functional categories (i.e. Activities of daily living). Percentage of Impairment CH    0%   CI    1-19% CJ    20-39% CK    40-59% CL    60-79% CM    80-99% CN     100%   [facility]  Score 6-24 24 23 20-22 15-19 10-14 7-9 6        Occupational Therapy Evaluation Charge Determination   History Examination Decision-Making   MEDIUM Complexity : Expanded review of history including physical, cognitive and psychosocial  history  LOW Complexity : 1-3 performance deficits relating to physical, cognitive , or psychosocial skils that result in activity limitations and / or participation restrictions  LOW Complexity : No comorbidities that affect functional and no verbal or physical assistance needed to complete eval tasks       Based on the above components, the patient evaluation is determined to be of the following complexity level: LOW     Pain Ratin/10    Activity Tolerance:   Fair and desaturates with exertion and requires oxygen    After treatment patient left in no apparent distress:    Sitting in chair, Call bell within reach, and nursing notified    COMMUNICATION/EDUCATION:   The patients plan of care was discussed with: Physical therapist and Registered nurse. Home safety education was provided and the patient/caregiver indicated understanding., Patient/family have participated as able in goal setting and plan of care. , and Patient/family agree to work toward stated goals and plan of care. This patients plan of care is appropriate for delegation to [facility]. OT/PT sessions occurred together for increased patient and clinician safety.      Problem: Self Care Deficits Care Plan (Adult)  Goal: *Acute Goals and Plan of Care (Insert Text)  Description: Pt will be mod I sup <> sit in prep for EOB ADLs  Pt will be mod I grooming sitting EOB/in chair  Pt will be mod A LE dressing sitting EOB/long sit  Pt will be mod I sit <>  prep for toileting LRAD  Pt will be mod I toileting/toilet transfer/cloth mgmt LRAD  Pt will be I following UE HEP in prep for self care tasks     Outcome: Not Met     Thank you for this referral.  Kd Mena OTR/L  Time Calculation: 25 mins

## 2021-01-19 ENCOUNTER — APPOINTMENT (OUTPATIENT)
Dept: GENERAL RADIOLOGY | Age: 66
DRG: 177 | End: 2021-01-19
Attending: INTERNAL MEDICINE
Payer: MEDICARE

## 2021-01-19 LAB
ANION GAP SERPL CALC-SCNC: 13 MMOL/L (ref 5–15)
BASOPHILS # BLD: 0 K/UL (ref 0–0.1)
BASOPHILS NFR BLD: 0 % (ref 0–1)
BUN SERPL-MCNC: 87 MG/DL (ref 6–20)
BUN/CREAT SERPL: 21 (ref 12–20)
CA-I BLD-MCNC: 8.6 MG/DL (ref 8.5–10.1)
CHLORIDE SERPL-SCNC: 102 MMOL/L (ref 97–108)
CO2 SERPL-SCNC: 18 MMOL/L (ref 21–32)
CREAT SERPL-MCNC: 4.17 MG/DL (ref 0.7–1.3)
CRP SERPL-MCNC: 2.27 MG/DL (ref 0–0.6)
DIFFERENTIAL METHOD BLD: ABNORMAL
EOSINOPHIL # BLD: 0 K/UL (ref 0–0.4)
EOSINOPHIL NFR BLD: 0 % (ref 0–7)
ERYTHROCYTE [DISTWIDTH] IN BLOOD BY AUTOMATED COUNT: 15.6 % (ref 11.5–14.5)
GLUCOSE BLD STRIP.AUTO-MCNC: 151 MG/DL (ref 65–100)
GLUCOSE BLD STRIP.AUTO-MCNC: 155 MG/DL (ref 65–100)
GLUCOSE BLD STRIP.AUTO-MCNC: 162 MG/DL (ref 65–100)
GLUCOSE BLD STRIP.AUTO-MCNC: 242 MG/DL (ref 65–100)
GLUCOSE SERPL-MCNC: 191 MG/DL (ref 65–100)
HCT VFR BLD AUTO: 34.7 % (ref 36.6–50.3)
HGB BLD-MCNC: 11.6 G/DL (ref 12.1–17)
IMM GRANULOCYTES # BLD AUTO: 0.1 K/UL (ref 0–0.04)
IMM GRANULOCYTES NFR BLD AUTO: 1 % (ref 0–0.5)
LDH SERPL L TO P-CCNC: 392 U/L (ref 85–241)
LYMPHOCYTES # BLD: 0.6 K/UL (ref 0.8–3.5)
LYMPHOCYTES NFR BLD: 5 % (ref 12–49)
MCH RBC QN AUTO: 26.1 PG (ref 26–34)
MCHC RBC AUTO-ENTMCNC: 33.4 G/DL (ref 30–36.5)
MCV RBC AUTO: 78 FL (ref 80–99)
MONOCYTES # BLD: 0.5 K/UL (ref 0–1)
MONOCYTES NFR BLD: 4 % (ref 5–13)
NEUTS SEG # BLD: 12.1 K/UL (ref 1.8–8)
NEUTS SEG NFR BLD: 90 % (ref 32–75)
NRBC # BLD: 0.05 K/UL (ref 0–0.01)
NRBC BLD-RTO: 0.4 PER 100 WBC
PERFORMED BY, TECHID: ABNORMAL
PLATELET # BLD AUTO: 471 K/UL (ref 150–400)
PMV BLD AUTO: 9.9 FL (ref 8.9–12.9)
POTASSIUM SERPL-SCNC: 4.3 MMOL/L (ref 3.5–5.1)
PROCALCITONIN SERPL-MCNC: 0.07 NG/ML
RBC # BLD AUTO: 4.45 M/UL (ref 4.1–5.7)
SODIUM SERPL-SCNC: 133 MMOL/L (ref 136–145)
WBC # BLD AUTO: 13.3 K/UL (ref 4.1–11.1)

## 2021-01-19 PROCEDURE — 85025 COMPLETE CBC W/AUTO DIFF WBC: CPT

## 2021-01-19 PROCEDURE — 80048 BASIC METABOLIC PNL TOTAL CA: CPT

## 2021-01-19 PROCEDURE — 74011250636 HC RX REV CODE- 250/636: Performed by: FAMILY MEDICINE

## 2021-01-19 PROCEDURE — 94762 N-INVAS EAR/PLS OXIMTRY CONT: CPT

## 2021-01-19 PROCEDURE — 82962 GLUCOSE BLOOD TEST: CPT

## 2021-01-19 PROCEDURE — 86140 C-REACTIVE PROTEIN: CPT

## 2021-01-19 PROCEDURE — 74011636637 HC RX REV CODE- 636/637: Performed by: FAMILY MEDICINE

## 2021-01-19 PROCEDURE — 94760 N-INVAS EAR/PLS OXIMETRY 1: CPT

## 2021-01-19 PROCEDURE — 74011000250 HC RX REV CODE- 250: Performed by: FAMILY MEDICINE

## 2021-01-19 PROCEDURE — 74011250637 HC RX REV CODE- 250/637: Performed by: PHYSICIAN ASSISTANT

## 2021-01-19 PROCEDURE — 36415 COLL VENOUS BLD VENIPUNCTURE: CPT

## 2021-01-19 PROCEDURE — 74011250636 HC RX REV CODE- 250/636: Performed by: PHYSICIAN ASSISTANT

## 2021-01-19 PROCEDURE — 71045 X-RAY EXAM CHEST 1 VIEW: CPT

## 2021-01-19 PROCEDURE — 65270000029 HC RM PRIVATE

## 2021-01-19 PROCEDURE — 74011250637 HC RX REV CODE- 250/637: Performed by: INTERNAL MEDICINE

## 2021-01-19 PROCEDURE — 83615 LACTATE (LD) (LDH) ENZYME: CPT

## 2021-01-19 PROCEDURE — 84145 PROCALCITONIN (PCT): CPT

## 2021-01-19 RX ORDER — IVERMECTIN 3 MG/1
12 TABLET ORAL ONCE
Status: COMPLETED | OUTPATIENT
Start: 2021-01-19 | End: 2021-01-19

## 2021-01-19 RX ORDER — IVERMECTIN 3 MG/1
6 TABLET ORAL ONCE
Status: DISCONTINUED | OUTPATIENT
Start: 2021-01-19 | End: 2021-01-19

## 2021-01-19 RX ADMIN — DEXAMETHASONE SODIUM PHOSPHATE 4 MG: 4 INJECTION, SOLUTION INTRA-ARTICULAR; INTRALESIONAL; INTRAMUSCULAR; INTRAVENOUS; SOFT TISSUE at 05:42

## 2021-01-19 RX ADMIN — HYDRALAZINE HYDROCHLORIDE 100 MG: 50 TABLET, FILM COATED ORAL at 18:10

## 2021-01-19 RX ADMIN — FAMOTIDINE 20 MG: 20 TABLET, FILM COATED ORAL at 20:14

## 2021-01-19 RX ADMIN — HEPARIN SODIUM 5000 UNITS: 5000 INJECTION INTRAVENOUS; SUBCUTANEOUS at 05:42

## 2021-01-19 RX ADMIN — DEXAMETHASONE SODIUM PHOSPHATE 4 MG: 4 INJECTION, SOLUTION INTRA-ARTICULAR; INTRALESIONAL; INTRAMUSCULAR; INTRAVENOUS; SOFT TISSUE at 16:01

## 2021-01-19 RX ADMIN — INSULIN LISPRO 2 UNITS: 100 INJECTION, SOLUTION INTRAVENOUS; SUBCUTANEOUS at 18:10

## 2021-01-19 RX ADMIN — CEFTRIAXONE SODIUM 1 G: 1 INJECTION, POWDER, FOR SOLUTION INTRAMUSCULAR; INTRAVENOUS at 20:15

## 2021-01-19 RX ADMIN — ALLOPURINOL 100 MG: 100 TABLET ORAL at 09:35

## 2021-01-19 RX ADMIN — Medication 10 ML: at 05:52

## 2021-01-19 RX ADMIN — AZITHROMYCIN DIHYDRATE 500 MG: 500 INJECTION, POWDER, LYOPHILIZED, FOR SOLUTION INTRAVENOUS at 20:15

## 2021-01-19 RX ADMIN — HYDRALAZINE HYDROCHLORIDE 100 MG: 50 TABLET, FILM COATED ORAL at 20:14

## 2021-01-19 RX ADMIN — AMLODIPINE BESYLATE 10 MG: 5 TABLET ORAL at 09:35

## 2021-01-19 RX ADMIN — LEVOTHYROXINE SODIUM 50 MCG: 0.03 TABLET ORAL at 05:42

## 2021-01-19 RX ADMIN — DEXAMETHASONE SODIUM PHOSPHATE 4 MG: 4 INJECTION, SOLUTION INTRA-ARTICULAR; INTRALESIONAL; INTRAMUSCULAR; INTRAVENOUS; SOFT TISSUE at 20:23

## 2021-01-19 RX ADMIN — Medication 10 ML: at 20:15

## 2021-01-19 RX ADMIN — CALCITRIOL CAPSULES 0.25 MCG 0.25 MCG: 0.25 CAPSULE ORAL at 09:35

## 2021-01-19 RX ADMIN — HEPARIN SODIUM 5000 UNITS: 5000 INJECTION INTRAVENOUS; SUBCUTANEOUS at 20:14

## 2021-01-19 RX ADMIN — IVERMECTIN 12 MG: 3 TABLET ORAL at 12:07

## 2021-01-19 RX ADMIN — HYDRALAZINE HYDROCHLORIDE 100 MG: 50 TABLET, FILM COATED ORAL at 09:35

## 2021-01-19 RX ADMIN — HEPARIN SODIUM 5000 UNITS: 5000 INJECTION INTRAVENOUS; SUBCUTANEOUS at 16:01

## 2021-01-19 RX ADMIN — LEVOTHYROXINE SODIUM 50 MCG: 0.03 TABLET ORAL at 09:35

## 2021-01-19 RX ADMIN — FAMOTIDINE 20 MG: 20 TABLET, FILM COATED ORAL at 09:35

## 2021-01-19 RX ADMIN — OXYCODONE HYDROCHLORIDE AND ACETAMINOPHEN 500 MG: 500 TABLET ORAL at 09:35

## 2021-01-19 RX ADMIN — CHOLECALCIFEROL TAB 125 MCG (5000 UNIT) 5000 UNITS: 125 TAB at 09:35

## 2021-01-19 RX ADMIN — Medication 1 CAPSULE: at 09:35

## 2021-01-19 RX ADMIN — Medication 10 ML: at 16:01

## 2021-01-19 NOTE — PROGRESS NOTES
Hospitalist Progress Note    Subjective:   Daily Progress Note: 1/19/2021 8:26 AM    Hospital Course: Patient is a 12-year-old black male with a history of type 2 diabetes, hypertension, CKD stage IV presented to the ER on 1/15/2021 for generalized weakness, nonproductive cough, shortness of breath. Patient reports that he tested positive for Covid several days ago. In the last 24 hours he had decreased oral intake, loss of appetite, worsening shortness of breath, subjective fever and chills. In the ED patient's oxygen saturation was in the 80s while ambulating and therefore was placed on supplemental oxygen. Chest x-ray showed signs concerning for bilateral pneumonia suspicious for COVID-19. He was given 1 L of hydration with 1 dose of Decadron. D-dimer elevated 2.69. , lactic acid 1.2, procalcitonin 0.28, C-reactive protein 13.50. Patient continues on supplemental oxygen. Patient is on IV Decadron, azithromycin, Rocephin. on zinc, vitamin C, vitamin D. Pulmonary Consulted. Subjective: Patient denies any SOB except with excercise, chest pain. Does have a cough, nonproductive.      Current Facility-Administered Medications   Medication Dose Route Frequency    ivermectin (STROMECTOL) tablet 12 mg  12 mg Oral ONCE    zinc sulfate (ZINCATE) 220 (50) mg capsule 1 Cap  1 Cap Oral DAILY    ascorbic acid (vitamin C) (VITAMIN C) tablet 500 mg  500 mg Oral DAILY    cholecalciferol (VITAMIN D3) tablet 5,000 Units  5,000 Units Oral DAILY    dexamethasone (DECADRON) 4 mg/mL injection 4 mg  4 mg IntraVENous Q8H    allopurinoL (ZYLOPRIM) tablet 100 mg  100 mg Oral DAILY    amLODIPine (NORVASC) tablet 10 mg  10 mg Oral DAILY    [Held by provider] atenoloL (TENORMIN) tablet 50 mg  50 mg Oral DAILY    calcitRIOL (ROCALTROL) capsule 0.25 mcg  0.25 mcg Oral DAILY    famotidine (PEPCID) tablet 20 mg  20 mg Oral BID    hydrALAZINE (APRESOLINE) tablet 100 mg  100 mg Oral TID    levothyroxine (SYNTHROID) tablet 50 mcg  50 mcg Oral ACB    sodium chloride (NS) flush 5-40 mL  5-40 mL IntraVENous Q8H    sodium chloride (NS) flush 5-40 mL  5-40 mL IntraVENous PRN    acetaminophen (TYLENOL) tablet 650 mg  650 mg Oral Q6H PRN    Or    acetaminophen (TYLENOL) suppository 650 mg  650 mg Rectal Q6H PRN    polyethylene glycol (MIRALAX) packet 17 g  17 g Oral DAILY PRN    promethazine (PHENERGAN) tablet 12.5 mg  12.5 mg Oral Q6H PRN    Or    ondansetron (ZOFRAN) injection 4 mg  4 mg IntraVENous Q6H PRN    heparin (porcine) injection 5,000 Units  5,000 Units SubCUTAneous Q8H    azithromycin (ZITHROMAX) 500 mg in 0.9% sodium chloride 250 mL (VIAL-MATE)  500 mg IntraVENous Q24H    cefTRIAXone (ROCEPHIN) 1 g in sterile water (preservative free) 10 mL IV syringe  1 g IntraVENous Q24H    glucose chewable tablet 16 g  4 Tab Oral PRN    dextrose (D50W) injection syrg 12.5-25 g  25-50 mL IntraVENous PRN    glucagon (GLUCAGEN) injection 1 mg  1 mg IntraMUSCular PRN    insulin lispro (HUMALOG) injection   SubCUTAneous AC&HS        Review of Systems  Constitutional: No fevers, No chills, No sweats, No fatigue, +Weakness  Eyes: No redness  Ears, nose, mouth, throat, and face: No nasal congestion, No sore throat, No voice change  Respiratory: No Shortness of Breath, ++ cough, No wheezing  Cardiovascular: No chest pain, No palpitations, No extremity edema  Gastrointestinal: No nausea, No vomiting, No diarrhea, No abdominal pain  Genitourinary: No frequency, No dysuria, No hematuria  Integument/breast: No skin lesion(s)   Neurological: No Confusion, No headaches, No dizziness      Objective:     Visit Vitals  BP (!) 147/86 (BP 1 Location: Right arm, BP Patient Position: At rest;Supine)   Pulse 91   Temp (!) 96.5 °F (35.8 °C)   Resp 19   Ht 5' 7\" (1.702 m)   Wt 114.3 kg (252 lb)   SpO2 94%   BMI 39.47 kg/m²    O2 Flow Rate (L/min): 3 l/min O2 Device: Nasal cannula    Temp (24hrs), Av.6 °F (36.4 °C), Min:96.5 °F (35.8 °C), Max:98.4 °F (36.9 °C)      No intake/output data recorded. No intake/output data recorded. PHYSICAL EXAM:  Constitutional: No acute distress  Skin: Extremities and face reveal no rashes. HEENT: Sclerae anicteric. Extra-occular muscles are intact. No oral ulcers  Cardiovascular: RRR   Respiratory:  Nonlabored, diminished  GI: Abdomen nondistended, soft, and nontender. Normal active bowel sounds. Musculoskeletal: No pitting edema of the lower legs. Able to move all ext  Neurological:  Patient is alert and oriented.  Cranial nerves II-XII grossly intact  Psychiatric: Mood appears appropriate       Data Review    Recent Results (from the past 24 hour(s))   GLUCOSE, POC    Collection Time: 01/18/21 11:37 AM   Result Value Ref Range    Glucose (POC) 160 (H) 65 - 100 mg/dL    Performed by [address] [place] [address], [employer]    Collection Time: 01/18/21 11:38 AM   Result Value Ref Range    Sodium 137 136 - 145 mmol/L    Potassium 3.6 3.5 - 5.1 mmol/L    Chloride 106 97 - 108 mmol/L    CO2 19 (L) 21 - 32 mmol/L    Anion gap 12 5 - 15 mmol/L    Glucose 145 (H) 65 - 100 mg/dL    BUN 88 (H) 6 - 20 mg/dL    Creatinine 4.31 (H) 0.70 - 1.30 mg/dL    BUN/Creatinine ratio 20 12 - 20      GFR est AA 17 (L) >60 ml/min/1.73m2    GFR est non-AA 14 (L) >60 ml/min/1.73m2    Calcium 8.4 (L) 8.5 - 10.1 mg/dL   CBC WITH AUTOMATED DIFF    Collection Time: 01/18/21 11:38 AM   Result Value Ref Range    WBC 11.7 (H) 4.1 - 11.1 K/uL    RBC 3.87 (L) 4.10 - 5.70 M/uL    HGB 10.4 (L) 12.1 - 17.0 g/dL    HCT 29.8 (L) 36.6 - 50.3 %    MCV 77.0 (L) 80.0 - 99.0 FL    MCH 26.9 26.0 - 34.0 PG    MCHC 34.9 30.0 - 36.5 g/dL    RDW 15.3 (H) 11.5 - 14.5 %    PLATELET 851 (H) 916 - 400 K/uL    MPV 9.6 8.9 - 12.9 FL    NRBC 0.5 (H) 0  WBC    ABSOLUTE NRBC 0.06 (H) 0.00 - 0.01 K/uL    NEUTROPHILS 92 (H) 32 - 75 %    BAND NEUTROPHILS 2 0 - 6 %    LYMPHOCYTES 5 (L) 12 - 49 %    MONOCYTES 1 (L) 5 - 13 %    EOSINOPHILS 0 0 - 7 %    BASOPHILS 0 0 - 1 %    IMMATURE GRANULOCYTES 0 %    ABS. NEUTROPHILS 11.0 (H) 1.8 - 8.0 K/UL    ABS. LYMPHOCYTES 0.6 (L) 0.8 - 3.5 K/UL    ABS. MONOCYTES 0.1 0.0 - 1.0 K/UL    ABS. EOSINOPHILS 0.0 0.0 - 0.4 K/UL    ABS. BASOPHILS 0.0 0.0 - 0.1 K/UL    ABS. IMM. GRANS. 0.0 K/UL    DF AUTOMATED      RBC COMMENTS Anisocytosis  1+       GLUCOSE, POC    Collection Time: 01/18/21  3:34 PM   Result Value Ref Range    Glucose (POC) 150 (H) 65 - 100 mg/dL    Performed by Zena Liriano, POC    Collection Time: 01/18/21  7:33 PM   Result Value Ref Range    Glucose (POC) 169 (H) 65 - 100 mg/dL    Performed by Etelvina Watkins, POC    Collection Time: 01/18/21  9:49 PM   Result Value Ref Range    Glucose (POC) 177 (H) 65 - 100 mg/dL    Performed by Lynette Mcgovern    GLUCOSE, POC    Collection Time: 01/19/21  9:21 AM   Result Value Ref Range    Glucose (POC) 155 (H) 65 - 100 mg/dL    Performed by Sasha Smith        Radiology review: Chest xray    Assessment:   1. Bilateral pneumonia concerning for COVID-19  2. Type 2 diabetes  3. Hypertension  4. Obesity  5. CKD stage IV  6. Hypothyroidism. Plan:    1. Oxygen saturation within normal limits on 3L. IV Rocephin and azithromycin,  IV Decadron. On zinc, vitamin C, vitamin D. Consult pulmonology. Per pulmonology due to renal function patient is not a candidate for remdesivir  Continue ivermectin per pulmonary  2. Blood glucose level stable. Continue with insulin sliding scale. May need to make further adjustments as he is on IV steroids. 3.  BP stable. Holding atenolol due to bradycardia. On norvasc and hydralzine. 4. We will continue to monitor. May consider nephology consult if worsens. 5.  On synthroid. TSH  6. CBC and BMP in the AM   7.   PT/OT     CODE STATUS Full     DVT prophylaxis: Heparin  Ulcer prophylaxis: Protonix    Care Plan discussed with: Patient/Family and Nurse     [phone] Niece Steven Merida, expressing concerns for [facility] and/or rehab    Discussed case with niece    Total time spent with patient: 33 minutes.

## 2021-01-19 NOTE — PROGRESS NOTES
Medical Progress Note      NAME: Prasanna Mcknight   :  1955  MRM:  [id number]    Date/Time: 2021  9:30 AM         Subjective:   I was asked by Lopez Johnson MD to see Prasanna Mcknight  a 77 y.o.    male in consultation      Excerpts from admission 1/15/2021 or consult notes as follows:   49-year-old male came in because of shortness of breath and dyspnea nonproductive cough patient was tested positive for COVID-19 several days ago then he started having worsening of his symptoms of shortness of breath dyspnea cough chest x-ray shows bilateral infiltrate consistent with COVID-19 he was hypoxic he was put on oxygen via nasal cannula 4 L so now pulmonary consult was called for further evaluation.        No Known Allergies      MAR reviewed and pertinent medications noted or modified as needed          Current Facility-Administered Medications   Medication    zinc sulfate (ZINCATE) 220 (50) mg capsule 1 Cap    ascorbic acid (vitamin C) (VITAMIN C) tablet 500 mg    cholecalciferol (VITAMIN D3) tablet 5,000 Units    dexamethasone (DECADRON) 4 mg/mL injection 4 mg    allopurinoL (ZYLOPRIM) tablet 100 mg    amLODIPine (NORVASC) tablet 10 mg    [Held by provider] atenoloL (TENORMIN) tablet 50 mg    calcitRIOL (ROCALTROL) capsule 0.25 mcg    famotidine (PEPCID) tablet 20 mg    hydrALAZINE (APRESOLINE) tablet 100 mg    levothyroxine (SYNTHROID) tablet 50 mcg    sodium chloride (NS) flush 5-40 mL    sodium chloride (NS) flush 5-40 mL    acetaminophen (TYLENOL) tablet 650 mg     Or    acetaminophen (TYLENOL) suppository 650 mg    polyethylene glycol (MIRALAX) packet 17 g    promethazine (PHENERGAN) tablet 12.5 mg     Or    ondansetron (ZOFRAN) injection 4 mg    heparin (porcine) injection 5,000 Units    azithromycin (ZITHROMAX) 500 mg in 0.9% sodium chloride 250 mL (VIAL-MATE)    cefTRIAXone (ROCEPHIN) 1 g in sterile water (preservative free) 10 mL IV syringe    glucose chewable tablet 16 g    dextrose (D50W) injection syrg 12.5-25 g    glucagon (GLUCAGEN) injection 1 mg    insulin lispro (HUMALOG) injection      Patient PCP: Katya Aiken DO  PMH:  has a past medical history of CKD (chronic kidney disease) stage 4, GFR 15-29 ml/min ([facility] [address] DM (diabetes mellitus) ([facility] [address] H/O cervical spine surgery, and HTN (hypertension). PSH:   has a past surgical history that includes hx orthopaedic. FHX: family history includes Coronary Artery Disease in his mother; Heart Failure in his mother; Pacemaker in his sister. SHX:  reports that he has never smoked. He has never used smokeless tobacco. He reports previous alcohol use. He reports previous drug use. ROS:     Review of Systems   Constitutional: Positive for malaise/fatigue. HENT: Negative. Eyes: Negative. Respiratory: Positive for cough and shortness of breath. Cardiovascular: Positive for orthopnea. Gastrointestinal: Negative. Genitourinary: Negative. Musculoskeletal: Negative. Neurological: Negative. Endo/Heme/Allergies: Negative. Psychiatric/Behavioral: Negative. Objective:       Vitals:      Last 24hrs VS reviewed since prior progress note. Most recent are:    Visit Vitals  BP (!) 141/90   Pulse 89   Temp (!) 96.5 °F (35.8 °C)   Resp 18   Ht 5' 7\" (1.702 m)   Wt 252 lb (114.3 kg)   SpO2 98%   BMI 39.47 kg/m²     SpO2 Readings from Last 6 Encounters:   01/19/21 98%    O2 Flow Rate (L/min): 3 l/min     No intake or output data in the 24 hours ending 01/19/21 0830       Exam:   Physical Exam   Constitutional: He appears distressed. HENT:   Head: Normocephalic and atraumatic. Eyes: Pupils are equal, round, and reactive to light. Conjunctivae are normal.   Neck: Normal range of motion. Neck supple. Cardiovascular: Normal rate and regular rhythm. Pulmonary/Chest: He is in respiratory distress. Abdominal: Soft.  Bowel sounds are normal.   Musculoskeletal:         General: Edema present. Neurological: He is alert.              Lab Data Reviewed: (see below)      Medications:  Current Facility-Administered Medications   Medication Dose Route Frequency    zinc sulfate (ZINCATE) 220 (50) mg capsule 1 Cap  1 Cap Oral DAILY    ascorbic acid (vitamin C) (VITAMIN C) tablet 500 mg  500 mg Oral DAILY    cholecalciferol (VITAMIN D3) tablet 5,000 Units  5,000 Units Oral DAILY    dexamethasone (DECADRON) 4 mg/mL injection 4 mg  4 mg IntraVENous Q8H    allopurinoL (ZYLOPRIM) tablet 100 mg  100 mg Oral DAILY    amLODIPine (NORVASC) tablet 10 mg  10 mg Oral DAILY    [Held by provider] atenoloL (TENORMIN) tablet 50 mg  50 mg Oral DAILY    calcitRIOL (ROCALTROL) capsule 0.25 mcg  0.25 mcg Oral DAILY    famotidine (PEPCID) tablet 20 mg  20 mg Oral BID    hydrALAZINE (APRESOLINE) tablet 100 mg  100 mg Oral TID    levothyroxine (SYNTHROID) tablet 50 mcg  50 mcg Oral ACB    sodium chloride (NS) flush 5-40 mL  5-40 mL IntraVENous Q8H    sodium chloride (NS) flush 5-40 mL  5-40 mL IntraVENous PRN    acetaminophen (TYLENOL) tablet 650 mg  650 mg Oral Q6H PRN    Or    acetaminophen (TYLENOL) suppository 650 mg  650 mg Rectal Q6H PRN    polyethylene glycol (MIRALAX) packet 17 g  17 g Oral DAILY PRN    promethazine (PHENERGAN) tablet 12.5 mg  12.5 mg Oral Q6H PRN    Or    ondansetron (ZOFRAN) injection 4 mg  4 mg IntraVENous Q6H PRN    heparin (porcine) injection 5,000 Units  5,000 Units SubCUTAneous Q8H    azithromycin (ZITHROMAX) 500 mg in 0.9% sodium chloride 250 mL (VIAL-MATE)  500 mg IntraVENous Q24H    cefTRIAXone (ROCEPHIN) 1 g in sterile water (preservative free) 10 mL IV syringe  1 g IntraVENous Q24H    glucose chewable tablet 16 g  4 Tab Oral PRN    dextrose (D50W) injection syrg 12.5-25 g  25-50 mL IntraVENous PRN    glucagon (GLUCAGEN) injection 1 mg  1 mg IntraMUSCular PRN    insulin lispro (HUMALOG) injection   SubCUTAneous AC&HS ______________________________________________________________________      Lab Review:     Recent Labs     01/18/21  1138 01/17/21  1000   WBC 11.7* 8.0   HGB 10.4* 9.5*   HCT 29.8* 27.6*   * 322     Recent Labs     01/18/21  1138 01/17/21  1000    138   K 3.6 3.5    107   CO2 19* 20*   * 154*   BUN 88* 92*   CREA 4.31* 4.15*   CA 8.4* 8.3*     No components found for: GLPOC  Recent Labs     01/17/21  0420   PH 7.39   PCO2 29*   PO2 64*   HCO3 19*     No results for input(s): INR, INREXT, INREXT, INREXT in the last 72 hours. IMPRESSION:   1. Acute hypoxic respiratory failure  2. COVID-19 pneumonia  3. History of hypertension diabetes mellitus  5. Body mass index is 39.47 kg/m². 4. Acute on chronic kidney disease creatinine is 5 his GFR is 14 not a candidate for remdesivir  5. He recieved ivermectin 12 mg 1 dose  6. Bradycardia resolved off beta-blocker  7. Pt is requiring Drug therapy requiring intensive monitoring for toxicity  8. Pt is unstable, unpredictable needing inpatient monitoring; is acutely ill and at high risk of sudden decline and decompensation with severe consequenses and continued end organ dysfunction and failure  9. Prognosis guarded        RECOMMENDATIONS/PLAN:     1. Patient is on 3 L nasal Cannula oxygen as salvage oxygen delivery device to provide high concentration of oxygen to overcome refractory hypoxia;  2. Continue with Decadron Zithromax and Rocephin. Patient received 1 dose of 12mg ivermectin. 3. Intubate and place on vent if NIV fails  4. Renal function improving slightly, will continue to monitor  5. Continue with Empiric IV antibiotics pending culture results   6. Follow culture results. Thus far no growth  7. Supplemental O2 to keep sats > 93%  8. Aspiration precautions  9. Continue labs to follow electrolytes, renal function and and blood counts  10. Glucose monitoring and SSI  11.  Bronchial hygiene with respiratory therapy techniques, bronchodilators  12.  DVT, SUP prophylaxis

## 2021-01-19 NOTE — PROGRESS NOTES
CM received consult for Rehab, SNF vs. HH. Spoke with the patient regarding discharge planning. Patient stated he lives at home alone but is able to care for himself. He stated he is not open to go to rehab and is only interested in [facility]. He has used [facility] in the past and would like to utilize them again if possible. Choice obtained and placed on chart. Referral has been sent. Patient also indicated he does not wear oxygen at home. CM will continue to follow should he have any oxygen needs at discharge.

## 2021-01-19 NOTE — PROGRESS NOTES
Medical Progress Note      NAME: Calista Berkowitz   :  1955  MRM:  [id number]    Date/Time: 2021  9:30 AM         Subjective:   I was asked by Elinor Kanner, MD to see Calista Berkowitz  a 77 y.o.    male in consultation      Excerpts from admission 1/15/2021 or consult notes as follows:   69-year-old male came in because of shortness of breath and dyspnea nonproductive cough patient was tested positive for COVID-19 several days ago then he started having worsening of his symptoms of shortness of breath dyspnea cough chest x-ray shows bilateral infiltrate consistent with COVID-19 he was hypoxic he was put on oxygen via nasal cannula 4 L so now pulmonary consult was called for further evaluation.        No Known Allergies      MAR reviewed and pertinent medications noted or modified as needed          Current Facility-Administered Medications   Medication    zinc sulfate (ZINCATE) 220 (50) mg capsule 1 Cap    ascorbic acid (vitamin C) (VITAMIN C) tablet 500 mg    cholecalciferol (VITAMIN D3) tablet 5,000 Units    dexamethasone (DECADRON) 4 mg/mL injection 4 mg    allopurinoL (ZYLOPRIM) tablet 100 mg    amLODIPine (NORVASC) tablet 10 mg    [Held by provider] atenoloL (TENORMIN) tablet 50 mg    calcitRIOL (ROCALTROL) capsule 0.25 mcg    famotidine (PEPCID) tablet 20 mg    hydrALAZINE (APRESOLINE) tablet 100 mg    levothyroxine (SYNTHROID) tablet 50 mcg    sodium chloride (NS) flush 5-40 mL    sodium chloride (NS) flush 5-40 mL    acetaminophen (TYLENOL) tablet 650 mg     Or    acetaminophen (TYLENOL) suppository 650 mg    polyethylene glycol (MIRALAX) packet 17 g    promethazine (PHENERGAN) tablet 12.5 mg     Or    ondansetron (ZOFRAN) injection 4 mg    heparin (porcine) injection 5,000 Units    azithromycin (ZITHROMAX) 500 mg in 0.9% sodium chloride 250 mL (VIAL-MATE)    cefTRIAXone (ROCEPHIN) 1 g in sterile water (preservative free) 10 mL IV syringe    glucose chewable tablet 16 g    dextrose (D50W) injection syrg 12.5-25 g    glucagon (GLUCAGEN) injection 1 mg    insulin lispro (HUMALOG) injection      Patient PCP: Macy Castle DO  PMH:  has a past medical history of CKD (chronic kidney disease) stage 4, GFR 15-29 ml/min ([address] DM (diabetes mellitus) ([facility] [address] H/O cervical spine surgery, and HTN (hypertension). PSH:   has a past surgical history that includes hx orthopaedic. FHX: family history includes Coronary Artery Disease in his mother; Heart Failure in his mother; Pacemaker in his sister. SHX:  reports that he has never smoked. He has never used smokeless tobacco. He reports previous alcohol use. He reports previous drug use. ROS:     Review of Systems   Constitutional: Positive for malaise/fatigue. HENT: Negative. Eyes: Negative. Respiratory: Positive for cough and shortness of breath. Cardiovascular: Positive for orthopnea. Gastrointestinal: Negative. Genitourinary: Negative. Musculoskeletal: Negative. Neurological: Negative. Endo/Heme/Allergies: Negative. Psychiatric/Behavioral: Negative. Objective:       Vitals:      Last 24hrs VS reviewed since prior progress note. Most recent are:    Visit Vitals  BP (!) 147/86 (BP 1 Location: Right arm, BP Patient Position: At rest;Supine)   Pulse 91   Temp (!) 96.5 °F (35.8 °C)   Resp 19   Ht 5' 7\" (1.702 m)   Wt 114.3 kg (252 lb)   SpO2 94%   BMI 39.47 kg/m²     SpO2 Readings from Last 6 Encounters:   01/19/21 94%    O2 Flow Rate (L/min): 3 l/min     No intake or output data in the 24 hours ending 01/19/21 0958       Exam:   Physical Exam   Constitutional: He appears distressed. HENT:   Head: Normocephalic and atraumatic. Eyes: Pupils are equal, round, and reactive to light. Conjunctivae are normal.   Neck: Normal range of motion. Neck supple. Cardiovascular: Normal rate and regular rhythm. Pulmonary/Chest: He is in respiratory distress. Abdominal: Soft. Bowel sounds are normal.   Musculoskeletal:         General: Edema present. Neurological: He is alert.              Lab Data Reviewed: (see below)      Medications:  Current Facility-Administered Medications   Medication Dose Route Frequency    zinc sulfate (ZINCATE) 220 (50) mg capsule 1 Cap  1 Cap Oral DAILY    ascorbic acid (vitamin C) (VITAMIN C) tablet 500 mg  500 mg Oral DAILY    cholecalciferol (VITAMIN D3) tablet 5,000 Units  5,000 Units Oral DAILY    dexamethasone (DECADRON) 4 mg/mL injection 4 mg  4 mg IntraVENous Q8H    allopurinoL (ZYLOPRIM) tablet 100 mg  100 mg Oral DAILY    amLODIPine (NORVASC) tablet 10 mg  10 mg Oral DAILY    [Held by provider] atenoloL (TENORMIN) tablet 50 mg  50 mg Oral DAILY    calcitRIOL (ROCALTROL) capsule 0.25 mcg  0.25 mcg Oral DAILY    famotidine (PEPCID) tablet 20 mg  20 mg Oral BID    hydrALAZINE (APRESOLINE) tablet 100 mg  100 mg Oral TID    levothyroxine (SYNTHROID) tablet 50 mcg  50 mcg Oral ACB    sodium chloride (NS) flush 5-40 mL  5-40 mL IntraVENous Q8H    sodium chloride (NS) flush 5-40 mL  5-40 mL IntraVENous PRN    acetaminophen (TYLENOL) tablet 650 mg  650 mg Oral Q6H PRN    Or    acetaminophen (TYLENOL) suppository 650 mg  650 mg Rectal Q6H PRN    polyethylene glycol (MIRALAX) packet 17 g  17 g Oral DAILY PRN    promethazine (PHENERGAN) tablet 12.5 mg  12.5 mg Oral Q6H PRN    Or    ondansetron (ZOFRAN) injection 4 mg  4 mg IntraVENous Q6H PRN    heparin (porcine) injection 5,000 Units  5,000 Units SubCUTAneous Q8H    azithromycin (ZITHROMAX) 500 mg in 0.9% sodium chloride 250 mL (VIAL-MATE)  500 mg IntraVENous Q24H    cefTRIAXone (ROCEPHIN) 1 g in sterile water (preservative free) 10 mL IV syringe  1 g IntraVENous Q24H    glucose chewable tablet 16 g  4 Tab Oral PRN    dextrose (D50W) injection syrg 12.5-25 g  25-50 mL IntraVENous PRN    glucagon (GLUCAGEN) injection 1 mg  1 mg IntraMUSCular PRN    insulin lispro (HUMALOG) injection SubCUTAneous AC&HS       ______________________________________________________________________      Lab Review:     Recent Labs     01/18/21  1138 01/17/21  1000   WBC 11.7* 8.0   HGB 10.4* 9.5*   HCT 29.8* 27.6*   * 322     Recent Labs     01/18/21  1138 01/17/21  1000    138   K 3.6 3.5    107   CO2 19* 20*   * 154*   BUN 88* 92*   CREA 4.31* 4.15*   CA 8.4* 8.3*     No components found for: GLPOC  Recent Labs     01/17/21  0420   PH 7.39   PCO2 29*   PO2 64*   HCO3 19*     No results for input(s): INR, INREXT, INREXT, INREXT in the last 72 hours. IMPRESSION:   1. Acute hypoxic respiratory failure  2. COVID-19 pneumonia  3. History of hypertension diabetes mellitus  5. Body mass index is 39.47 kg/m². 4. Acute on chronic kidney disease creatinine is 5 his GFR is 14 not a candidate for remdesivir   5. He recieved ivermectin 12 mg 1 dose  6. Bradycardia resolved off beta-blocker  7. Pt is requiring Drug therapy requiring intensive monitoring for toxicity  8. Pt is unstable, unpredictable needing inpatient monitoring; is acutely ill and at high risk of sudden decline and decompensation with severe consequenses and continued end organ dysfunction and failure  9. Prognosis guarded        RECOMMENDATIONS/PLAN:     1. Patient is on 3 L nasal Cannula oxygen as salvage oxygen delivery device to provide high concentration of oxygen to overcome refractory hypoxia;  2. Continue with Decadron Zithromax and Rocephin. Patient received 1 dose of 12 mg ivermectin. We will give another dose of ivermectin 12 mg today. 3. Intubate and place on vent if NIV fails will repeat CXR today  4. Renal function improving slightly, will continue to monitor  5. Continue with Empiric IV antibiotics pending culture results   6. Follow culture results. Thus far no growth  7. Supplemental O2 to keep sats > 93%  8. Aspiration precautions  9.  Continue labs to follow electrolytes, renal function and and blood counts  10. Glucose monitoring and SSI  11. Bronchial hygiene with respiratory therapy techniques, bronchodilators  12.  DVT, SUP prophylaxis

## 2021-01-20 LAB
GLUCOSE BLD STRIP.AUTO-MCNC: 132 MG/DL (ref 65–100)
GLUCOSE BLD STRIP.AUTO-MCNC: 179 MG/DL (ref 65–100)
GLUCOSE BLD STRIP.AUTO-MCNC: 188 MG/DL (ref 65–100)
GLUCOSE BLD STRIP.AUTO-MCNC: 201 MG/DL (ref 65–100)
PERFORMED BY, TECHID: ABNORMAL

## 2021-01-20 PROCEDURE — 74011000250 HC RX REV CODE- 250: Performed by: FAMILY MEDICINE

## 2021-01-20 PROCEDURE — 77010033678 HC OXYGEN DAILY

## 2021-01-20 PROCEDURE — 74011250637 HC RX REV CODE- 250/637: Performed by: PHYSICIAN ASSISTANT

## 2021-01-20 PROCEDURE — 74011250636 HC RX REV CODE- 250/636: Performed by: FAMILY MEDICINE

## 2021-01-20 PROCEDURE — 94760 N-INVAS EAR/PLS OXIMETRY 1: CPT

## 2021-01-20 PROCEDURE — 74011636637 HC RX REV CODE- 636/637: Performed by: FAMILY MEDICINE

## 2021-01-20 PROCEDURE — 82962 GLUCOSE BLOOD TEST: CPT

## 2021-01-20 PROCEDURE — 74011250636 HC RX REV CODE- 250/636: Performed by: PHYSICIAN ASSISTANT

## 2021-01-20 PROCEDURE — 97116 GAIT TRAINING THERAPY: CPT

## 2021-01-20 PROCEDURE — 65270000029 HC RM PRIVATE

## 2021-01-20 RX ORDER — DEXAMETHASONE SODIUM PHOSPHATE 4 MG/ML
4 INJECTION, SOLUTION INTRA-ARTICULAR; INTRALESIONAL; INTRAMUSCULAR; INTRAVENOUS; SOFT TISSUE EVERY 24 HOURS
Status: DISCONTINUED | OUTPATIENT
Start: 2021-01-21 | End: 2021-01-21

## 2021-01-20 RX ADMIN — LEVOTHYROXINE SODIUM 50 MCG: 0.03 TABLET ORAL at 05:21

## 2021-01-20 RX ADMIN — AZITHROMYCIN DIHYDRATE 500 MG: 500 INJECTION, POWDER, LYOPHILIZED, FOR SOLUTION INTRAVENOUS at 22:06

## 2021-01-20 RX ADMIN — CHOLECALCIFEROL TAB 125 MCG (5000 UNIT) 5000 UNITS: 125 TAB at 09:04

## 2021-01-20 RX ADMIN — Medication 10 ML: at 05:17

## 2021-01-20 RX ADMIN — INSULIN LISPRO 2 UNITS: 100 INJECTION, SOLUTION INTRAVENOUS; SUBCUTANEOUS at 09:03

## 2021-01-20 RX ADMIN — HEPARIN SODIUM 5000 UNITS: 5000 INJECTION INTRAVENOUS; SUBCUTANEOUS at 14:33

## 2021-01-20 RX ADMIN — Medication 10 ML: at 21:59

## 2021-01-20 RX ADMIN — FAMOTIDINE 20 MG: 20 TABLET, FILM COATED ORAL at 22:09

## 2021-01-20 RX ADMIN — CALCITRIOL CAPSULES 0.25 MCG 0.25 MCG: 0.25 CAPSULE ORAL at 09:04

## 2021-01-20 RX ADMIN — Medication 10 ML: at 14:33

## 2021-01-20 RX ADMIN — ALLOPURINOL 100 MG: 100 TABLET ORAL at 09:04

## 2021-01-20 RX ADMIN — HEPARIN SODIUM 5000 UNITS: 5000 INJECTION INTRAVENOUS; SUBCUTANEOUS at 05:16

## 2021-01-20 RX ADMIN — DEXAMETHASONE SODIUM PHOSPHATE 4 MG: 4 INJECTION, SOLUTION INTRA-ARTICULAR; INTRALESIONAL; INTRAMUSCULAR; INTRAVENOUS; SOFT TISSUE at 14:32

## 2021-01-20 RX ADMIN — DEXAMETHASONE SODIUM PHOSPHATE 4 MG: 4 INJECTION, SOLUTION INTRA-ARTICULAR; INTRALESIONAL; INTRAMUSCULAR; INTRAVENOUS; SOFT TISSUE at 05:16

## 2021-01-20 RX ADMIN — HYDRALAZINE HYDROCHLORIDE 100 MG: 50 TABLET, FILM COATED ORAL at 09:03

## 2021-01-20 RX ADMIN — AMLODIPINE BESYLATE 10 MG: 5 TABLET ORAL at 09:04

## 2021-01-20 RX ADMIN — Medication 1 CAPSULE: at 09:04

## 2021-01-20 RX ADMIN — HYDRALAZINE HYDROCHLORIDE 100 MG: 50 TABLET, FILM COATED ORAL at 22:06

## 2021-01-20 RX ADMIN — HEPARIN SODIUM 5000 UNITS: 5000 INJECTION INTRAVENOUS; SUBCUTANEOUS at 22:05

## 2021-01-20 RX ADMIN — FAMOTIDINE 20 MG: 20 TABLET, FILM COATED ORAL at 09:04

## 2021-01-20 RX ADMIN — OXYCODONE HYDROCHLORIDE AND ACETAMINOPHEN 500 MG: 500 TABLET ORAL at 09:04

## 2021-01-20 RX ADMIN — CEFTRIAXONE SODIUM 1 G: 1 INJECTION, POWDER, FOR SOLUTION INTRAMUSCULAR; INTRAVENOUS at 22:05

## 2021-01-20 RX ADMIN — HYDRALAZINE HYDROCHLORIDE 100 MG: 50 TABLET, FILM COATED ORAL at 16:58

## 2021-01-20 NOTE — PROGRESS NOTES
[facility] referrals sent out and still no accepting [facility] agency. Additional referrals sent out on patient's behalf.          KATHRYN Modi

## 2021-01-20 NOTE — PROGRESS NOTES
Hospitalist Progress Note    Subjective:   Daily Progress Note: 1/20/2021 8:26 AM    Hospital Course: Patient is a 64-year-old black male with a history of type 2 diabetes, hypertension, CKD stage IV presented to the ER on 1/15/2021 for generalized weakness, nonproductive cough, shortness of breath. Patient reports that he tested positive for Covid several days ago. In the last 24 hours he had decreased oral intake, loss of appetite, worsening shortness of breath, subjective fever and chills. In the ED patient's oxygen saturation was in the 80s while ambulating and therefore was placed on supplemental oxygen. Chest x-ray showed signs concerning for bilateral pneumonia suspicious for COVID-19. He was given 1 L of hydration with 1 dose of Decadron. D-dimer elevated 2.69. , lactic acid 1.2, procalcitonin 0.28, C-reactive protein 13.50. Patient continues on supplemental oxygen. Patient is on IV Decadron, azithromycin, Rocephin. on zinc, vitamin C, vitamin D. Pulmonary Consulted. Oxygen demands are improving. Will try exercise test monitoring oxygen saturation pending discharge. Subjective: Patient denies any SOB except with excercise, chest pain. Does have a cough, nonproductive.      Current Facility-Administered Medications   Medication Dose Route Frequency    zinc sulfate (ZINCATE) 220 (50) mg capsule 1 Cap  1 Cap Oral DAILY    ascorbic acid (vitamin C) (VITAMIN C) tablet 500 mg  500 mg Oral DAILY    cholecalciferol (VITAMIN D3) tablet 5,000 Units  5,000 Units Oral DAILY    dexamethasone (DECADRON) 4 mg/mL injection 4 mg  4 mg IntraVENous Q8H    allopurinoL (ZYLOPRIM) tablet 100 mg  100 mg Oral DAILY    amLODIPine (NORVASC) tablet 10 mg  10 mg Oral DAILY    [Held by provider] atenoloL (TENORMIN) tablet 50 mg  50 mg Oral DAILY    calcitRIOL (ROCALTROL) capsule 0.25 mcg  0.25 mcg Oral DAILY    famotidine (PEPCID) tablet 20 mg  20 mg Oral BID    hydrALAZINE (APRESOLINE) tablet 100 mg  100 mg Oral TID    levothyroxine (SYNTHROID) tablet 50 mcg  50 mcg Oral ACB    sodium chloride (NS) flush 5-40 mL  5-40 mL IntraVENous Q8H    sodium chloride (NS) flush 5-40 mL  5-40 mL IntraVENous PRN    acetaminophen (TYLENOL) tablet 650 mg  650 mg Oral Q6H PRN    Or    acetaminophen (TYLENOL) suppository 650 mg  650 mg Rectal Q6H PRN    polyethylene glycol (MIRALAX) packet 17 g  17 g Oral DAILY PRN    promethazine (PHENERGAN) tablet 12.5 mg  12.5 mg Oral Q6H PRN    Or    ondansetron (ZOFRAN) injection 4 mg  4 mg IntraVENous Q6H PRN    heparin (porcine) injection 5,000 Units  5,000 Units SubCUTAneous Q8H    azithromycin (ZITHROMAX) 500 mg in 0.9% sodium chloride 250 mL (VIAL-MATE)  500 mg IntraVENous Q24H    cefTRIAXone (ROCEPHIN) 1 g in sterile water (preservative free) 10 mL IV syringe  1 g IntraVENous Q24H    glucose chewable tablet 16 g  4 Tab Oral PRN    dextrose (D50W) injection syrg 12.5-25 g  25-50 mL IntraVENous PRN    glucagon (GLUCAGEN) injection 1 mg  1 mg IntraMUSCular PRN    insulin lispro (HUMALOG) injection   SubCUTAneous AC&HS        Review of Systems  Constitutional: No fevers, No chills, No sweats, No fatigue, +Weakness  Eyes: No redness  Ears, nose, mouth, throat, and face: No nasal congestion, No sore throat, No voice change  Respiratory: No Shortness of Breath, ++ cough, No wheezing  Cardiovascular: No chest pain, No palpitations, No extremity edema  Gastrointestinal: No nausea, No vomiting, No diarrhea, No abdominal pain  Genitourinary: No frequency, No dysuria, No hematuria  Integument/breast: No skin lesion(s)   Neurological: No Confusion, No headaches, No dizziness      Objective:     Visit Vitals  /83 (BP 1 Location: Left arm, BP Patient Position: At rest)   Pulse 91   Temp 97.3 °F (36.3 °C)   Resp 18   Ht 5' 7\" (1.702 m)   Wt 252 lb (114.3 kg)   SpO2 100%   BMI 39.47 kg/m²    O2 Flow Rate (L/min): 3 l/min O2 Device: Nasal cannula    Temp (24hrs), Av °F (36.1 °C), Min:96.5 °F (35.8 °C), Max:97.3 °F (36.3 °C)      No intake/output data recorded. No intake/output data recorded. PHYSICAL EXAM:  Constitutional: No acute distress  Skin: Extremities and face reveal no rashes. HEENT: Sclerae anicteric. Extra-occular muscles are intact. No oral ulcers  Cardiovascular: RRR   Respiratory:  Nonlabored, diminished  GI: Abdomen nondistended, soft, and nontender. Normal active bowel sounds. Musculoskeletal: No pitting edema of the lower legs. Able to move all ext  Neurological:  Patient is alert and oriented. Cranial nerves II-XII grossly intact  Psychiatric: Mood appears appropriate       Data Review    Recent Results (from the past 24 hour(s))   GLUCOSE, POC    Collection Time: 01/19/21  5:53 PM   Result Value Ref Range    Glucose (POC) 242 (H) 65 - 100 mg/dL    Performed by Ceola Goldmann    GLUCOSE, POC    Collection Time: 01/19/21  8:11 PM   Result Value Ref Range    Glucose (POC) 162 (H) 65 - 100 mg/dL    Performed by Debbie Yang, POC    Collection Time: 01/20/21  7:50 AM   Result Value Ref Range    Glucose (POC) 201 (H) 65 - 100 mg/dL    Performed by Emmy, POC    Collection Time: 01/20/21 10:51 AM   Result Value Ref Range    Glucose (POC) 179 (H) 65 - 100 mg/dL    Performed by Roni Cherry        Radiology review: Chest xray      Assessment/Plan:    1. Bilateral pneumonia concerning for COVID-19  Oxygen saturation within normal limits on 3L. Reduced to 1L for oxygen trial.   IV Rocephin and azithromycin,  IV Decadron. On zinc, vitamin C, vitamin D. Consult pulmonology. Per pulmonology due to renal function patient is not a candidate for remdesivir  Continue ivermectin per pulmonary  Decrease decadron  Pulse oximetry exercise test tomorow    2. Type 2 diabetes  Blood glucose level stable. Continue with insulin sliding scale. May need to make further adjustments as he is on IV steroids. 3.  Hypertension  BP stable. Holding atenolol due to bradycardia. On norvasc and hydralzine. 4. CKD stage IV  We will continue to monitor. BUN and Cr stable    5. Hypothyroidism  On synthroid. TSH within normal limits    6. CBC and BMP in the AM   7. PT/OT     CODE STATUS Full      DVT prophylaxis: Heparin  Ulcer prophylaxis: Protonix    Care Plan discussed with: Patient/Family and Nurse     [phone] Angela Merino, expressing concerns for [place] and/or rehab    Discussed case with kwesimaria e. Plan for discharge tomorrow pending exercise pulse oximetry test with RT. Total time spent with patient: 36 minutes.

## 2021-01-20 NOTE — PROGRESS NOTES
Pulmonary  Progress Note      NAME: Abel Dillard   :  1955  MRM:  [id number]    Date/Time: 2021  9:30 AM         Subjective:   I was asked by Jacky Kapadia MD to see Abel Dillard  a 77 y.o.    male in consultation      Excerpts from admission 1/15/2021 or consult notes as follows:   49-year-old male came in because of shortness of breath and dyspnea nonproductive cough patient was tested positive for COVID-19 several days ago then he started having worsening of his symptoms of shortness of breath dyspnea cough chest x-ray shows bilateral infiltrate consistent with COVID-19 he was hypoxic he was put on oxygen via nasal cannula 4 L so now pulmonary consult was called for further evaluation.        No Known Allergies      MAR reviewed and pertinent medications noted or modified as needed          Current Facility-Administered Medications   Medication    zinc sulfate (ZINCATE) 220 (50) mg capsule 1 Cap    ascorbic acid (vitamin C) (VITAMIN C) tablet 500 mg    cholecalciferol (VITAMIN D3) tablet 5,000 Units    dexamethasone (DECADRON) 4 mg/mL injection 4 mg    allopurinoL (ZYLOPRIM) tablet 100 mg    amLODIPine (NORVASC) tablet 10 mg    [Held by provider] atenoloL (TENORMIN) tablet 50 mg    calcitRIOL (ROCALTROL) capsule 0.25 mcg    famotidine (PEPCID) tablet 20 mg    hydrALAZINE (APRESOLINE) tablet 100 mg    levothyroxine (SYNTHROID) tablet 50 mcg    sodium chloride (NS) flush 5-40 mL    sodium chloride (NS) flush 5-40 mL    acetaminophen (TYLENOL) tablet 650 mg     Or    acetaminophen (TYLENOL) suppository 650 mg    polyethylene glycol (MIRALAX) packet 17 g    promethazine (PHENERGAN) tablet 12.5 mg     Or    ondansetron (ZOFRAN) injection 4 mg    heparin (porcine) injection 5,000 Units    azithromycin (ZITHROMAX) 500 mg in 0.9% sodium chloride 250 mL (VIAL-MATE)    cefTRIAXone (ROCEPHIN) 1 g in sterile water (preservative free) 10 mL IV syringe    glucose chewable tablet 16 g    dextrose (D50W) injection syrg 12.5-25 g    glucagon (GLUCAGEN) injection 1 mg    insulin lispro (HUMALOG) injection      Patient PCP: Jeremy Bull DO  PMH:  has a past medical history of CKD (chronic kidney disease) stage 4, GFR 15-29 ml/min ([facility] [address] DM (diabetes mellitus) ([facility] [address] H/O cervical spine surgery, and HTN (hypertension). PSH:   has a past surgical history that includes hx orthopaedic. FHX: family history includes Coronary Artery Disease in his mother; Heart Failure in his mother; Pacemaker in his sister. SHX:  reports that he has never smoked. He has never used smokeless tobacco. He reports previous alcohol use. He reports previous drug use. ROS:     Review of Systems   Constitutional: Positive for malaise/fatigue. HENT: Negative. Eyes: Negative. Respiratory: Positive for cough and shortness of breath. Cardiovascular: Positive for orthopnea. Gastrointestinal: Negative. Genitourinary: Negative. Musculoskeletal: Negative. Neurological: Negative. Endo/Heme/Allergies: Negative. Psychiatric/Behavioral: Negative. Objective:       Vitals:      Last 24hrs VS reviewed since prior progress note. Most recent are:    Visit Vitals  /83 (BP 1 Location: Left arm, BP Patient Position: At rest)   Pulse 91   Temp 97.3 °F (36.3 °C)   Resp 18   Ht 5' 7\" (1.702 m)   Wt 114.3 kg (252 lb)   SpO2 100%   BMI 39.47 kg/m²     SpO2 Readings from Last 6 Encounters:   01/20/21 100%    O2 Flow Rate (L/min): 3 l/min     No intake or output data in the 24 hours ending 01/20/21 8675       Exam:   Physical Exam   Constitutional: He appears distressed. HENT:   Head: Normocephalic and atraumatic. Eyes: Pupils are equal, round, and reactive to light. Conjunctivae are normal.   Neck: Normal range of motion. Neck supple. Cardiovascular: Normal rate and regular rhythm. Pulmonary/Chest: He is in respiratory distress. Abdominal: Soft.  Bowel sounds are normal.   Musculoskeletal:         General: Edema present.   Neurological: He is alert.             Lab Data Reviewed: (see below)      Medications:  Current Facility-Administered Medications   Medication Dose Route Frequency   • zinc sulfate (ZINCATE) 220 (50) mg capsule 1 Cap  1 Cap Oral DAILY   • ascorbic acid (vitamin C) (VITAMIN C) tablet 500 mg  500 mg Oral DAILY   • cholecalciferol (VITAMIN D3) tablet 5,000 Units  5,000 Units Oral DAILY   • dexamethasone (DECADRON) 4 mg/mL injection 4 mg  4 mg IntraVENous Q8H   • allopurinoL (ZYLOPRIM) tablet 100 mg  100 mg Oral DAILY   • amLODIPine (NORVASC) tablet 10 mg  10 mg Oral DAILY   • [Held by provider] atenoloL (TENORMIN) tablet 50 mg  50 mg Oral DAILY   • calcitRIOL (ROCALTROL) capsule 0.25 mcg  0.25 mcg Oral DAILY   • famotidine (PEPCID) tablet 20 mg  20 mg Oral BID   • hydrALAZINE (APRESOLINE) tablet 100 mg  100 mg Oral TID   • levothyroxine (SYNTHROID) tablet 50 mcg  50 mcg Oral ACB   • sodium chloride (NS) flush 5-40 mL  5-40 mL IntraVENous Q8H   • sodium chloride (NS) flush 5-40 mL  5-40 mL IntraVENous PRN   • acetaminophen (TYLENOL) tablet 650 mg  650 mg Oral Q6H PRN    Or   • acetaminophen (TYLENOL) suppository 650 mg  650 mg Rectal Q6H PRN   • polyethylene glycol (MIRALAX) packet 17 g  17 g Oral DAILY PRN   • promethazine (PHENERGAN) tablet 12.5 mg  12.5 mg Oral Q6H PRN    Or   • ondansetron (ZOFRAN) injection 4 mg  4 mg IntraVENous Q6H PRN   • heparin (porcine) injection 5,000 Units  5,000 Units SubCUTAneous Q8H   • azithromycin (ZITHROMAX) 500 mg in 0.9% sodium chloride 250 mL (VIAL-MATE)  500 mg IntraVENous Q24H   • cefTRIAXone (ROCEPHIN) 1 g in sterile water (preservative free) 10 mL IV syringe  1 g IntraVENous Q24H   • glucose chewable tablet 16 g  4 Tab Oral PRN   • dextrose (D50W) injection syrg 12.5-25 g  25-50 mL IntraVENous PRN   • glucagon (GLUCAGEN) injection 1 mg  1 mg IntraMUSCular PRN   • insulin lispro (HUMALOG) injection   SubCUTAneous  AC&HS       ______________________________________________________________________      Lab Review:     Recent Labs     01/19/21  1357 01/18/21  1138 01/17/21  1000   WBC 13.3* 11.7* 8.0   HGB 11.6* 10.4* 9.5*   HCT 34.7* 29.8* 27.6*   * 409* 322     Recent Labs     01/19/21  1357 01/18/21  1138 01/17/21  1000   * 137 138   K 4.3 3.6 3.5    106 107   CO2 18* 19* 20*   * 145* 154*   BUN 87* 88* 92*   CREA 4.17* 4.31* 4.15*   CA 8.6 8.4* 8.3*     No components found for: GLPOC  No results for input(s): PH, PCO2, PO2, HCO3, FIO2 in the last 72 hours. No results for input(s): INR, INREXT, INREXT, INREXT in the last 72 hours. IMPRESSION:   1. Acute hypoxic respiratory failure on 3 LNC  2. COVID-19 pneumonia  3. History of hypertension diabetes mellitus  5. Body mass index is 39.47 kg/m². 4. Acute on chronic kidney disease creatinine is 5 his GFR is 14 not a candidate for remdesivir   5. He recieved ivermectin 12 mg 1 dose and another dose yesterday  6. Bradycardia resolved off beta-blocker  7. Pt is requiring Drug therapy requiring intensive monitoring for toxicity  8. Pt is unstable, unpredictable needing inpatient monitoring; is acutely ill and at high risk of sudden decline and decompensation with severe consequenses and continued end organ dysfunction and failure  9. Prognosis guarded        RECOMMENDATIONS/PLAN:     1. Patient is on 3 L nasal Cannula oxygen as salvage oxygen delivery device to provide high concentration of oxygen to overcome refractory hypoxia;  2. Continue with Decadron Zithromax and Rocephin. Patient received 1 dose of 12 mg ivermectin. And also another dose of ivermectin 12 mg yesterday  3. Intubate and place on vent if NIV fails will repeat CXR today  4. Renal function improving slightly, will continue to monitor  5. Follow culture results. Thus far no growth  6. Supplemental O2 to keep sats > 93%  7. Aspiration precautions  8.  Continue labs to follow electrolytes, renal function and and blood counts  9. Glucose monitoring and SSI  10. Bronchial hygiene with respiratory therapy techniques, bronchodilators  11.  DVT, SUP prophylaxis

## 2021-01-20 NOTE — PROGRESS NOTES
[facility] PHYSICAL THERAPY TREATMENT  Patient: Angelika Calderon (1 y.o. male)  Date: 1/20/2021  Diagnosis: COVID-19 [U07.1]  Bilateral pneumonia [J18.9]  Acute respiratory failure with hypoxia ([facility] [address] [J96.01] <principal problem not specified>       Precautions:    Chart, physical therapy assessment, plan of care and goals were reviewed. ASSESSMENT  Patient continues with skilled PT services and is progressing towards goals. Pt semi supine in bed upon arrival and agreeable to session. Performed bed mobility with SBA. STS required CGAx1 for safety. Ambulated with RW and CGA from bed>chair, ~12'. Patient had no LOB or knee buckling during gait, however demo's slow gait speed with a step to gait pattern. Pt declining participation with TE as this time, so patient was educated on seated therex and agreeable to performing throughout the day. Other factors to consider for discharge: Family support, time since onset         PLAN :  Patient continues to benefit from skilled intervention to address the above impairments. Continue treatment per established plan of care. to address goals. Recommendation for discharge: (in order for the patient to meet his/her long term goals)  HHPT    This discharge recommendation:  Has been made in collaboration with the attending provider and/or case management    IF patient discharges home will need the following DME: none       SUBJECTIVE:   Patient stated I would like to get up now    OBJECTIVE DATA SUMMARY:   Critical Behavior:  Neurologic State: Alert  Orientation Level: Oriented X4  Cognition: Appropriate for age attention/concentration  Safety/Judgement: Good awareness of safety precautions  Functional Mobility Training:  Bed Mobility:  Supine to Sit: Stand-by assistance  Scooting: Stand-by assistance    Transfers:  Sit to Stand: Contact guard assistance  Stand to Sit: Contact guard assistance  Bed to Chair: Contact guard assistance    Balance:  Sitting: Intact; Without support  Standing: Intact;With support    Ambulation/Gait Training:  Distance (ft): 12 Feet (ft)  Assistive Device: Gait belt;Walker, rolling  Ambulation - Level of Assistance: Contact guard assistance  Speed/Venessa: Slow      Therapeutic Exercises:   Declined participation in therex so pt educated on seated therex and instructed to perform throughout day and patient verbalized understanding.     Pain Ratin/10    Activity Tolerance:   Fair  Please refer to the flowsheet for vital signs taken during this treatment.    After treatment patient left in no apparent distress:   Sitting in chair and Call bell within reach        Problem: Mobility Impaired (Adult and Pediatric)  Goal: *Acute Goals and Plan of Care (Insert Text)  Description: Pt will be I with LE HEP in 7 days.  Pt will perform bed mobility with mod I in 7 days.  Pt will perform transfers with mod I in 7 days.   Pt will amb  feet with LRAD safely with mod I in 7 days.         Outcome: Progressing Towards Goal     Albaro Prado PTA   Time Calculation: 13 mins

## 2021-01-20 NOTE — PROGRESS NOTES
Visit attempted for patient in [facility] unit during rounds. Per COVID-19 isolation protocol currently in effect, I provided silent support and prayer outside patient room. There were no family members present. Chaplains will follow up if further referrals are requested. Chaplain Cary Echavarria M.Div.    can be reached by calling the  at [facility]  [phone]

## 2021-01-21 LAB
GLUCOSE BLD STRIP.AUTO-MCNC: 133 MG/DL (ref 65–100)
GLUCOSE BLD STRIP.AUTO-MCNC: 143 MG/DL (ref 65–100)
GLUCOSE BLD STRIP.AUTO-MCNC: 156 MG/DL (ref 65–100)
GLUCOSE BLD STRIP.AUTO-MCNC: 159 MG/DL (ref 65–100)
PERFORMED BY, TECHID: ABNORMAL

## 2021-01-21 PROCEDURE — 65270000029 HC RM PRIVATE

## 2021-01-21 PROCEDURE — 74011250636 HC RX REV CODE- 250/636: Performed by: FAMILY MEDICINE

## 2021-01-21 PROCEDURE — 82962 GLUCOSE BLOOD TEST: CPT

## 2021-01-21 PROCEDURE — 74011250637 HC RX REV CODE- 250/637: Performed by: PHYSICIAN ASSISTANT

## 2021-01-21 PROCEDURE — 74011636637 HC RX REV CODE- 636/637: Performed by: PHYSICIAN ASSISTANT

## 2021-01-21 RX ORDER — AZITHROMYCIN 500 MG/1
500 TABLET, FILM COATED ORAL DAILY
Status: DISCONTINUED | OUTPATIENT
Start: 2021-01-21 | End: 2021-01-26 | Stop reason: HOSPADM

## 2021-01-21 RX ORDER — PREDNISONE 20 MG/1
20 TABLET ORAL
Status: DISCONTINUED | OUTPATIENT
Start: 2021-01-21 | End: 2021-01-26 | Stop reason: HOSPADM

## 2021-01-21 RX ORDER — AMOXICILLIN AND CLAVULANATE POTASSIUM 500; 125 MG/1; MG/1
1 TABLET, FILM COATED ORAL EVERY 12 HOURS
Status: DISCONTINUED | OUTPATIENT
Start: 2021-01-21 | End: 2021-01-26 | Stop reason: HOSPADM

## 2021-01-21 RX ADMIN — AMOXICILLIN AND CLAVULANATE POTASSIUM 1 TABLET: 500; 125 TABLET, FILM COATED ORAL at 21:36

## 2021-01-21 RX ADMIN — CALCITRIOL CAPSULES 0.25 MCG 0.25 MCG: 0.25 CAPSULE ORAL at 09:00

## 2021-01-21 RX ADMIN — AMLODIPINE BESYLATE 10 MG: 5 TABLET ORAL at 09:00

## 2021-01-21 RX ADMIN — HYDRALAZINE HYDROCHLORIDE 100 MG: 50 TABLET, FILM COATED ORAL at 17:11

## 2021-01-21 RX ADMIN — HEPARIN SODIUM 5000 UNITS: 5000 INJECTION INTRAVENOUS; SUBCUTANEOUS at 16:01

## 2021-01-21 RX ADMIN — Medication 10 ML: at 14:52

## 2021-01-21 RX ADMIN — ALLOPURINOL 100 MG: 100 TABLET ORAL at 08:59

## 2021-01-21 RX ADMIN — CHOLECALCIFEROL TAB 125 MCG (5000 UNIT) 5000 UNITS: 125 TAB at 08:59

## 2021-01-21 RX ADMIN — FAMOTIDINE 20 MG: 20 TABLET, FILM COATED ORAL at 21:36

## 2021-01-21 RX ADMIN — Medication 1 CAPSULE: at 09:00

## 2021-01-21 RX ADMIN — HEPARIN SODIUM 5000 UNITS: 5000 INJECTION INTRAVENOUS; SUBCUTANEOUS at 05:28

## 2021-01-21 RX ADMIN — HYDRALAZINE HYDROCHLORIDE 100 MG: 50 TABLET, FILM COATED ORAL at 09:00

## 2021-01-21 RX ADMIN — AZITHROMYCIN MONOHYDRATE 500 MG: 500 TABLET ORAL at 18:10

## 2021-01-21 RX ADMIN — LEVOTHYROXINE SODIUM 50 MCG: 0.03 TABLET ORAL at 09:00

## 2021-01-21 RX ADMIN — HYDRALAZINE HYDROCHLORIDE 100 MG: 50 TABLET, FILM COATED ORAL at 21:36

## 2021-01-21 RX ADMIN — OXYCODONE HYDROCHLORIDE AND ACETAMINOPHEN 500 MG: 500 TABLET ORAL at 09:00

## 2021-01-21 RX ADMIN — PREDNISONE 20 MG: 20 TABLET ORAL at 18:10

## 2021-01-21 RX ADMIN — HEPARIN SODIUM 5000 UNITS: 5000 INJECTION INTRAVENOUS; SUBCUTANEOUS at 21:36

## 2021-01-21 RX ADMIN — FAMOTIDINE 20 MG: 20 TABLET, FILM COATED ORAL at 08:59

## 2021-01-21 NOTE — PROGRESS NOTES
Hospitalist Progress Note    Subjective:   Daily Progress Note: 1/21/2021 8:26 AM    Hospital Course: Patient is a 58-year-old black male with a history of type 2 diabetes, hypertension, CKD stage IV presented to the ER on 1/15/2021 for generalized weakness, nonproductive cough, shortness of breath. Patient reports that he tested positive for Covid several days ago. In the last 24 hours he had decreased oral intake, loss of appetite, worsening shortness of breath, subjective fever and chills. In the ED patient's oxygen saturation was in the 80s while ambulating and therefore was placed on supplemental oxygen. Chest x-ray showed signs concerning for bilateral pneumonia suspicious for COVID-19. He was given 1 L of hydration with 1 dose of Decadron. D-dimer elevated 2.69. , lactic acid 1.2, procalcitonin 0.28, C-reactive protein 13.50. Patient continues on supplemental oxygen. Patient is on IV Decadron, azithromycin, Rocephin. on zinc, vitamin C, vitamin D. Pulmonary Consulted. Oxygen demands are improving. Will try exercise test monitoring oxygen saturation pending discharge. Subjective: Patient denies any SOB except with excercise, chest pain. Does have a cough, nonproductive.      Current Facility-Administered Medications   Medication Dose Route Frequency    dexamethasone (DECADRON) 4 mg/mL injection 4 mg  4 mg IntraVENous Q24H    zinc sulfate (ZINCATE) 220 (50) mg capsule 1 Cap  1 Cap Oral DAILY    ascorbic acid (vitamin C) (VITAMIN C) tablet 500 mg  500 mg Oral DAILY    cholecalciferol (VITAMIN D3) tablet 5,000 Units  5,000 Units Oral DAILY    allopurinoL (ZYLOPRIM) tablet 100 mg  100 mg Oral DAILY    amLODIPine (NORVASC) tablet 10 mg  10 mg Oral DAILY    [Held by provider] atenoloL (TENORMIN) tablet 50 mg  50 mg Oral DAILY    calcitRIOL (ROCALTROL) capsule 0.25 mcg  0.25 mcg Oral DAILY    famotidine (PEPCID) tablet 20 mg  20 mg Oral BID    hydrALAZINE (APRESOLINE) tablet 100 mg  100 mg Oral TID    levothyroxine (SYNTHROID) tablet 50 mcg  50 mcg Oral ACB    sodium chloride (NS) flush 5-40 mL  5-40 mL IntraVENous Q8H    sodium chloride (NS) flush 5-40 mL  5-40 mL IntraVENous PRN    acetaminophen (TYLENOL) tablet 650 mg  650 mg Oral Q6H PRN    Or    acetaminophen (TYLENOL) suppository 650 mg  650 mg Rectal Q6H PRN    polyethylene glycol (MIRALAX) packet 17 g  17 g Oral DAILY PRN    promethazine (PHENERGAN) tablet 12.5 mg  12.5 mg Oral Q6H PRN    Or    ondansetron (ZOFRAN) injection 4 mg  4 mg IntraVENous Q6H PRN    heparin (porcine) injection 5,000 Units  5,000 Units SubCUTAneous Q8H    azithromycin (ZITHROMAX) 500 mg in 0.9% sodium chloride 250 mL (VIAL-MATE)  500 mg IntraVENous Q24H    cefTRIAXone (ROCEPHIN) 1 g in sterile water (preservative free) 10 mL IV syringe  1 g IntraVENous Q24H    glucose chewable tablet 16 g  4 Tab Oral PRN    dextrose (D50W) injection syrg 12.5-25 g  25-50 mL IntraVENous PRN    glucagon (GLUCAGEN) injection 1 mg  1 mg IntraMUSCular PRN    insulin lispro (HUMALOG) injection   SubCUTAneous AC&HS        Review of Systems  Constitutional: No fevers, No chills, No sweats, No fatigue, +Weakness  Eyes: No redness  Ears, nose, mouth, throat, and face: No nasal congestion, No sore throat, No voice change  Respiratory: No Shortness of Breath, ++ cough, No wheezing  Cardiovascular: No chest pain, No palpitations, No extremity edema  Gastrointestinal: No nausea, No vomiting, No diarrhea, No abdominal pain  Genitourinary: No frequency, No dysuria, No hematuria  Integument/breast: No skin lesion(s)   Neurological: No Confusion, No headaches, No dizziness      Objective:     Visit Vitals  /77 (BP 1 Location: Left arm, BP Patient Position: At rest)   Pulse (!) 102   Temp 98.2 °F (36.8 °C)   Resp 18   Ht 5' 7\" (1.702 m)   Wt 114.3 kg (252 lb)   SpO2 99%   BMI 39.47 kg/m²    O2 Flow Rate (L/min): 1 l/min O2 Device: Room air    Temp (24hrs), Av.6 °F (35.3 °C), Min:92.7 °F (33.7 °C), Max:98.2 °F (36.8 °C)      No intake/output data recorded. 01/19 1901 - 01/21 0700  In: -   Out: 600 [Urine:600]    PHYSICAL EXAM:  Constitutional: No acute distress  Skin: Extremities and face reveal no rashes. HEENT: Sclerae anicteric. Extra-occular muscles are intact. No oral ulcers  Cardiovascular: RRR   Respiratory:  Nonlabored, diminished  GI: Abdomen nondistended, soft, and nontender. Normal active bowel sounds. Musculoskeletal: No pitting edema of the lower legs. Able to move all ext  Neurological:  Patient is alert and oriented. Cranial nerves II-XII grossly intact  Psychiatric: Mood appears appropriate       Data Review    Recent Results (from the past 24 hour(s))   GLUCOSE, POC    Collection Time: 01/20/21  4:30 PM   Result Value Ref Range    Glucose (POC) 188 (H) 65 - 100 mg/dL    Performed by Leonides CELAYA    GLUCOSE, POC    Collection Time: 01/20/21  8:22 PM   Result Value Ref Range    Glucose (POC) 132 (H) 65 - 100 mg/dL    Performed by Alexsandra Cmaeron, POC    Collection Time: 01/21/21  8:53 AM   Result Value Ref Range    Glucose (POC) 133 (H) 65 - 100 mg/dL    Performed by [employer], POC    Collection Time: 01/21/21 10:39 AM   Result Value Ref Range    Glucose (POC) 143 (H) 65 - 100 mg/dL    Performed by Myrna Ortiz        Radiology review: Chest xray      Assessment/Plan:    1. Bilateral pneumonia concerning for COVID-19  Oxygen saturation within normal limits on 3L. Reduced to 1L for oxygen trial.   IV Rocephin and azithromycin,  IV Decadron. On zinc, vitamin C, vitamin D. Consult pulmonology. Per pulmonology due to renal function patient is not a candidate for remdesivir  Continue ivermectin per pulmonary  Decrease decadron  Pulse oximetry exercise test Exercise was 83% on RA, will need home O2    2. Type 2 diabetes  Blood glucose level stable. Continue with insulin sliding scale.   May need to make further adjustments as he is on IV steroids. 3.  Hypertension  BP stable. Holding atenolol due to bradycardia. On norvasc and hydralzine. 4. CKD stage IV  We will continue to monitor. BUN and Cr stable    5. Hypothyroidism  On synthroid. TSH within normal limits    6. CBC and BMP in the AM   7. PT/OT     CODE STATUS Full      DVT prophylaxis: Heparin  Ulcer prophylaxis: Protonix    Care Plan discussed with: Patient/Family and Nurse     [phone] Nanci Khan, expressing concerns for [facility] and/or rehab    Discussed case with nanci. Plan for discharge tomorrow pending SNF versus home health  Total time spent with patient: 33 minutes.

## 2021-01-21 NOTE — PROGRESS NOTES
Pulmonary  Progress Note      NAME: Ruben Harris   :  1955  MRM:  [id number]    Date/Time: 2021  9:30 AM         Subjective:   I was asked by Adriane Malave MD to see Ruben Harris  a 77 y.o.    male in consultation      Excerpts from admission 1/15/2021 or consult notes as follows:   13-year-old male came in because of shortness of breath and dyspnea nonproductive cough patient was tested positive for COVID-19 several days ago then he started having worsening of his symptoms of shortness of breath dyspnea cough chest x-ray shows bilateral infiltrate consistent with COVID-19 he was hypoxic he was put on oxygen via nasal cannula 4 L so now pulmonary consult was called for further evaluation.        No Known Allergies      MAR reviewed and pertinent medications noted or modified as needed          Current Facility-Administered Medications   Medication    zinc sulfate (ZINCATE) 220 (50) mg capsule 1 Cap    ascorbic acid (vitamin C) (VITAMIN C) tablet 500 mg    cholecalciferol (VITAMIN D3) tablet 5,000 Units    dexamethasone (DECADRON) 4 mg/mL injection 4 mg    allopurinoL (ZYLOPRIM) tablet 100 mg    amLODIPine (NORVASC) tablet 10 mg    [Held by provider] atenoloL (TENORMIN) tablet 50 mg    calcitRIOL (ROCALTROL) capsule 0.25 mcg    famotidine (PEPCID) tablet 20 mg    hydrALAZINE (APRESOLINE) tablet 100 mg    levothyroxine (SYNTHROID) tablet 50 mcg    sodium chloride (NS) flush 5-40 mL    sodium chloride (NS) flush 5-40 mL    acetaminophen (TYLENOL) tablet 650 mg     Or    acetaminophen (TYLENOL) suppository 650 mg    polyethylene glycol (MIRALAX) packet 17 g    promethazine (PHENERGAN) tablet 12.5 mg     Or    ondansetron (ZOFRAN) injection 4 mg    heparin (porcine) injection 5,000 Units    azithromycin (ZITHROMAX) 500 mg in 0.9% sodium chloride 250 mL (VIAL-MATE)    cefTRIAXone (ROCEPHIN) 1 g in sterile water (preservative free) 10 mL IV syringe    glucose chewable tablet 16 g    dextrose (D50W) injection syrg 12.5-25 g    glucagon (GLUCAGEN) injection 1 mg    insulin lispro (HUMALOG) injection      Patient PCP: Rj Pedroza DO  PMH:  has a past medical history of CKD (chronic kidney disease) stage 4, GFR 15-29 ml/min ([facility] [address] DM (diabetes mellitus) ([facility] [address] H/O cervical spine surgery, and HTN (hypertension). PSH:   has a past surgical history that includes hx orthopaedic. FHX: family history includes Coronary Artery Disease in his mother; Heart Failure in his mother; Pacemaker in his sister. SHX:  reports that he has never smoked. He has never used smokeless tobacco. He reports previous alcohol use. He reports previous drug use. ROS:     Review of Systems   Constitutional: Positive for malaise/fatigue. HENT: Negative. Eyes: Negative. Respiratory: Positive for cough and shortness of breath. Cardiovascular: Positive for orthopnea. Gastrointestinal: Negative. Genitourinary: Negative. Musculoskeletal: Negative. Neurological: Negative. Endo/Heme/Allergies: Negative. Psychiatric/Behavioral: Negative. Objective:       Vitals:      Last 24hrs VS reviewed since prior progress note. Most recent are:    Visit Vitals  /77   Pulse (!) 102   Temp (!) 92.7 °F (33.7 °C)   Resp 18   Ht 5' 7\" (1.702 m)   Wt 114.3 kg (252 lb)   SpO2 99%   BMI 39.47 kg/m²     SpO2 Readings from Last 6 Encounters:   01/21/21 99%    O2 Flow Rate (L/min): 1 l/min       Intake/Output Summary (Last 24 hours) at 1/21/2021 1011  Last data filed at 1/21/2021 0452  Gross per 24 hour   Intake    Output 600 ml   Net -600 ml          Exam:   Physical Exam   Constitutional: He appears distressed. HENT:   Head: Normocephalic and atraumatic. Eyes: Pupils are equal, round, and reactive to light. Conjunctivae are normal.   Neck: Normal range of motion. Neck supple. Cardiovascular: Normal rate and regular rhythm.    Pulmonary/Chest: He is in respiratory distress. Abdominal: Soft. Bowel sounds are normal.   Musculoskeletal:         General: Edema present. Neurological: He is alert.              Lab Data Reviewed: (see below)      Medications:  Current Facility-Administered Medications   Medication Dose Route Frequency    dexamethasone (DECADRON) 4 mg/mL injection 4 mg  4 mg IntraVENous Q24H    zinc sulfate (ZINCATE) 220 (50) mg capsule 1 Cap  1 Cap Oral DAILY    ascorbic acid (vitamin C) (VITAMIN C) tablet 500 mg  500 mg Oral DAILY    cholecalciferol (VITAMIN D3) tablet 5,000 Units  5,000 Units Oral DAILY    allopurinoL (ZYLOPRIM) tablet 100 mg  100 mg Oral DAILY    amLODIPine (NORVASC) tablet 10 mg  10 mg Oral DAILY    [Held by provider] atenoloL (TENORMIN) tablet 50 mg  50 mg Oral DAILY    calcitRIOL (ROCALTROL) capsule 0.25 mcg  0.25 mcg Oral DAILY    famotidine (PEPCID) tablet 20 mg  20 mg Oral BID    hydrALAZINE (APRESOLINE) tablet 100 mg  100 mg Oral TID    levothyroxine (SYNTHROID) tablet 50 mcg  50 mcg Oral ACB    sodium chloride (NS) flush 5-40 mL  5-40 mL IntraVENous Q8H    sodium chloride (NS) flush 5-40 mL  5-40 mL IntraVENous PRN    acetaminophen (TYLENOL) tablet 650 mg  650 mg Oral Q6H PRN    Or    acetaminophen (TYLENOL) suppository 650 mg  650 mg Rectal Q6H PRN    polyethylene glycol (MIRALAX) packet 17 g  17 g Oral DAILY PRN    promethazine (PHENERGAN) tablet 12.5 mg  12.5 mg Oral Q6H PRN    Or    ondansetron (ZOFRAN) injection 4 mg  4 mg IntraVENous Q6H PRN    heparin (porcine) injection 5,000 Units  5,000 Units SubCUTAneous Q8H    azithromycin (ZITHROMAX) 500 mg in 0.9% sodium chloride 250 mL (VIAL-MATE)  500 mg IntraVENous Q24H    cefTRIAXone (ROCEPHIN) 1 g in sterile water (preservative free) 10 mL IV syringe  1 g IntraVENous Q24H    glucose chewable tablet 16 g  4 Tab Oral PRN    dextrose (D50W) injection syrg 12.5-25 g  25-50 mL IntraVENous PRN    glucagon (GLUCAGEN) injection 1 mg  1 mg IntraMUSCular PRN    insulin lispro (HUMALOG) injection   SubCUTAneous AC&HS       ______________________________________________________________________      Lab Review:     Recent Labs     01/19/21  1357 01/18/21  1138   WBC 13.3* 11.7*   HGB 11.6* 10.4*   HCT 34.7* 29.8*   * 409*     Recent Labs     01/19/21  1357 01/18/21  1138   * 137   K 4.3 3.6    106   CO2 18* 19*   * 145*   BUN 87* 88*   CREA 4.17* 4.31*   CA 8.6 8.4*     No components found for: GLPOC  No results for input(s): PH, PCO2, PO2, HCO3, FIO2 in the last 72 hours. No results for input(s): INR, INREXT, INREXT, INREXT in the last 72 hours. IMPRESSION:   1. Acute hypoxic respiratory failure on 1 LNC  2. COVID-19 pneumonia  3. History of hypertension diabetes mellitus  5. Body mass index is 39.47 kg/m². 4. Acute on chronic kidney disease creatinine is 5 his GFR is 14 not a candidate for remdesivir   5. He recieved ivermectin 12 mg 1 dose and another dose 1/19  6. Bradycardia resolved off beta-blocker  7. Pt is requiring Drug therapy requiring intensive monitoring for toxicity  8. Pt is unstable, unpredictable needing inpatient monitoring; is acutely ill and at high risk of sudden decline and decompensation with severe consequenses and continued end organ dysfunction and failure  9. Prognosis guarded        RECOMMENDATIONS/PLAN:     1. Patient is on 1 L nasal Cannula oxygen as salvage oxygen delivery device to provide high concentration of oxygen to overcome refractory hypoxia;  2. Continue with Decadron Zithromax and Rocephin. Patient received 1 dose of 12 mg ivermectin. And also another dose of ivermectin 12 mg 1/19  3. On zinc, vit C, vit D  4. Renal function improving slightly, will continue to monitor  5. Follow culture results. Thus far no growth  6. Supplemental O2 to keep sats > 93%  7. Aspiration precautions  8. Continue labs to follow electrolytes, renal function and and blood counts  9. Glucose monitoring and SSI  10.  Bronchial hygiene with respiratory therapy techniques, bronchodilators  11.  DVT, SUP prophylaxis

## 2021-01-21 NOTE — PROGRESS NOTES
Comprehensive Nutrition Assessment    Type and Reason for Visit: RD nutrition re-screen/LOS    Nutrition Recommendations/Plan:   Continue Diabetic Consistent Carb, Regular diet  Honor pt preferences within diet restriction  Nursing to document % p.o. meal and snack intake, BMs in I/Os      Nutrition Assessment:  Admitted for COVID-19, bilateral pneumonia, acute respiratory failure with hypoxia; pulmonary following, respiratory therapy following. Pt reports \"eating pretty good\", consuming >75% of tray; denied concerns with nutrtion. Supplemental oxygen. Labs and meds reviewed. Malnutrition Assessment:  Malnutrition Status:  No malnutrition      Nutrition Related Findings:  NFPE not performed 2/2 isolation precautions; nutrition information obtained from EMR and from pt via phone (). Pt denied chewing or swallowing difficulties. No n/v/c/d; last BM 1/20. Non-pitting edema on lower legs. Wounds:    None       Current Nutrition Therapies:  DIET DIABETIC CONSISTENT CARB Regular    Anthropometric Measures:  · Height:  5' 7\" (170.2 cm)  · Current Body Wt:  114.3 kg (252 lb)   · Ideal Body Wt:  148 lbs:  170.3 %    · BMI Category:  Obese class 2 (BMI 35.0-39. 9)       Nutrition Diagnosis:   No nutrition diagnosis at this time     Nutrition Interventions:   Food and/or Nutrient Delivery: Continue current diet  Nutrition Education and Counseling: No recommendations at this time  Coordination of Nutrition Care: Continue to monitor while inpatient    Nutrition Monitoring and Evaluation:   Behavioral-Environmental Outcomes: None identified  Food/Nutrient Intake Outcomes: Food and nutrient intake  Physical Signs/Symptoms Outcomes: Skin, Fluid status or edema    Discharge Planning:    No discharge needs at this time     Electronically signed by Arbil Tarango RD on 1/21/2021 at 2:23 PM    Contact:

## 2021-01-21 NOTE — PROGRESS NOTES
16: 25PM Patient is pending review for [place] via [address] agency. Still no accepting [place] agency thus far. Referral sent to [facility] re: [facility], [place]            Still no accepting [place] agency even w/additional referrals sent. Patient declined d/t payor not in network; limited staffing d/t COVID-; and patient lives outside of their servicing area. Will speak w/the patient to inform.         Nicki Palma, MSW

## 2021-01-21 NOTE — PROGRESS NOTES
Pulmonary  Progress Note      NAME: Guillermo Cotto   :  1955  MRM:  [id number]    Date/Time: 2021  9:30 AM         Subjective:   I was asked by Michaelle Child MD to see Guillermo Cotto  a 77 y.o.    male in consultation      Excerpts from admission 1/15/2021 or consult notes as follows:   69-year-old male came in because of shortness of breath and dyspnea nonproductive cough patient was tested positive for COVID-19 several days ago then he started having worsening of his symptoms of shortness of breath dyspnea cough chest x-ray shows bilateral infiltrate consistent with COVID-19 he was hypoxic he was put on oxygen via nasal cannula 4 L so now pulmonary consult was called for further evaluation.        No Known Allergies      MAR reviewed and pertinent medications noted or modified as needed          Current Facility-Administered Medications   Medication    zinc sulfate (ZINCATE) 220 (50) mg capsule 1 Cap    ascorbic acid (vitamin C) (VITAMIN C) tablet 500 mg    cholecalciferol (VITAMIN D3) tablet 5,000 Units    dexamethasone (DECADRON) 4 mg/mL injection 4 mg    allopurinoL (ZYLOPRIM) tablet 100 mg    amLODIPine (NORVASC) tablet 10 mg    [Held by provider] atenoloL (TENORMIN) tablet 50 mg    calcitRIOL (ROCALTROL) capsule 0.25 mcg    famotidine (PEPCID) tablet 20 mg    hydrALAZINE (APRESOLINE) tablet 100 mg    levothyroxine (SYNTHROID) tablet 50 mcg    sodium chloride (NS) flush 5-40 mL    sodium chloride (NS) flush 5-40 mL    acetaminophen (TYLENOL) tablet 650 mg     Or    acetaminophen (TYLENOL) suppository 650 mg    polyethylene glycol (MIRALAX) packet 17 g    promethazine (PHENERGAN) tablet 12.5 mg     Or    ondansetron (ZOFRAN) injection 4 mg    heparin (porcine) injection 5,000 Units    azithromycin (ZITHROMAX) 500 mg in 0.9% sodium chloride 250 mL (VIAL-MATE)    cefTRIAXone (ROCEPHIN) 1 g in sterile water (preservative free) 10 mL IV syringe    glucose chewable tablet 16 g    dextrose (D50W) injection syrg 12.5-25 g    glucagon (GLUCAGEN) injection 1 mg    insulin lispro (HUMALOG) injection      Patient PCP: Evon Billings DO  PMH:  has a past medical history of CKD (chronic kidney disease) stage 4, GFR 15-29 ml/min ([facility] [address] DM (diabetes mellitus) ([facility] [address] H/O cervical spine surgery, and HTN (hypertension). PSH:   has a past surgical history that includes hx orthopaedic. FHX: family history includes Coronary Artery Disease in his mother; Heart Failure in his mother; Pacemaker in his sister. SHX:  reports that he has never smoked. He has never used smokeless tobacco. He reports previous alcohol use. He reports previous drug use. ROS:     Review of Systems   Constitutional: Positive for malaise/fatigue. HENT: Negative. Eyes: Negative. Respiratory: Positive for cough and shortness of breath. Cardiovascular: Positive for orthopnea. Gastrointestinal: Negative. Genitourinary: Negative. Musculoskeletal: Negative. Neurological: Negative. Endo/Heme/Allergies: Negative. Psychiatric/Behavioral: Negative. Objective:       Vitals:      Last 24hrs VS reviewed since prior progress note. Most recent are:    Visit Vitals  /80   Pulse (!) 102   Temp (!) 92.7 °F (33.7 °C)   Resp 18   Ht 5' 7\" (1.702 m)   Wt 252 lb (114.3 kg)   SpO2 95%   BMI 39.47 kg/m²     SpO2 Readings from Last 6 Encounters:   01/21/21 95%    O2 Flow Rate (L/min): 1 l/min       Intake/Output Summary (Last 24 hours) at 1/21/2021 9053  Last data filed at 1/21/2021 0452  Gross per 24 hour   Intake    Output 600 ml   Net -600 ml          Exam:   Physical Exam   Constitutional: He appears distressed. HENT:   Head: Normocephalic and atraumatic. Eyes: Pupils are equal, round, and reactive to light. Conjunctivae are normal.   Neck: Normal range of motion. Neck supple. Cardiovascular: Normal rate and regular rhythm.    Pulmonary/Chest: He is in respiratory distress. Abdominal: Soft. Bowel sounds are normal.   Musculoskeletal:         General: Edema present. Neurological: He is alert.              Lab Data Reviewed: (see below)      Medications:  Current Facility-Administered Medications   Medication Dose Route Frequency    dexamethasone (DECADRON) 4 mg/mL injection 4 mg  4 mg IntraVENous Q24H    zinc sulfate (ZINCATE) 220 (50) mg capsule 1 Cap  1 Cap Oral DAILY    ascorbic acid (vitamin C) (VITAMIN C) tablet 500 mg  500 mg Oral DAILY    cholecalciferol (VITAMIN D3) tablet 5,000 Units  5,000 Units Oral DAILY    allopurinoL (ZYLOPRIM) tablet 100 mg  100 mg Oral DAILY    amLODIPine (NORVASC) tablet 10 mg  10 mg Oral DAILY    [Held by provider] atenoloL (TENORMIN) tablet 50 mg  50 mg Oral DAILY    calcitRIOL (ROCALTROL) capsule 0.25 mcg  0.25 mcg Oral DAILY    famotidine (PEPCID) tablet 20 mg  20 mg Oral BID    hydrALAZINE (APRESOLINE) tablet 100 mg  100 mg Oral TID    levothyroxine (SYNTHROID) tablet 50 mcg  50 mcg Oral ACB    sodium chloride (NS) flush 5-40 mL  5-40 mL IntraVENous Q8H    sodium chloride (NS) flush 5-40 mL  5-40 mL IntraVENous PRN    acetaminophen (TYLENOL) tablet 650 mg  650 mg Oral Q6H PRN    Or    acetaminophen (TYLENOL) suppository 650 mg  650 mg Rectal Q6H PRN    polyethylene glycol (MIRALAX) packet 17 g  17 g Oral DAILY PRN    promethazine (PHENERGAN) tablet 12.5 mg  12.5 mg Oral Q6H PRN    Or    ondansetron (ZOFRAN) injection 4 mg  4 mg IntraVENous Q6H PRN    heparin (porcine) injection 5,000 Units  5,000 Units SubCUTAneous Q8H    azithromycin (ZITHROMAX) 500 mg in 0.9% sodium chloride 250 mL (VIAL-MATE)  500 mg IntraVENous Q24H    cefTRIAXone (ROCEPHIN) 1 g in sterile water (preservative free) 10 mL IV syringe  1 g IntraVENous Q24H    glucose chewable tablet 16 g  4 Tab Oral PRN    dextrose (D50W) injection syrg 12.5-25 g  25-50 mL IntraVENous PRN    glucagon (GLUCAGEN) injection 1 mg  1 mg IntraMUSCular PRN    insulin lispro (HUMALOG) injection   SubCUTAneous AC&HS       ______________________________________________________________________      Lab Review:     Recent Labs     01/19/21  1357 01/18/21  1138   WBC 13.3* 11.7*   HGB 11.6* 10.4*   HCT 34.7* 29.8*   * 409*     Recent Labs     01/19/21  1357 01/18/21  1138   * 137   K 4.3 3.6    106   CO2 18* 19*   * 145*   BUN 87* 88*   CREA 4.17* 4.31*   CA 8.6 8.4*     No components found for: GLPOC  No results for input(s): PH, PCO2, PO2, HCO3, FIO2 in the last 72 hours. No results for input(s): INR, INREXT, INREXT, INREXT in the last 72 hours. IMPRESSION:   1. Acute hypoxic respiratory failure on 3 LNC  2. COVID-19 pneumonia  3. History of hypertension diabetes mellitus  5. Body mass index is 39.47 kg/m². 4. Acute on chronic kidney disease creatinine is 5 his GFR is 14 not a candidate for remdesivir   5. He recieved ivermectin 12 mg 1 dose and another dose 1/19  6. Bradycardia resolved off beta-blocker  7. Pt is requiring Drug therapy requiring intensive monitoring for toxicity  8. Pt is unstable, unpredictable needing inpatient monitoring; is acutely ill and at high risk of sudden decline and decompensation with severe consequenses and continued end organ dysfunction and failure  9. Prognosis guarded        RECOMMENDATIONS/PLAN:     1. Patient is on 3 L nasal Cannula oxygen as salvage oxygen delivery device to provide high concentration of oxygen to overcome refractory hypoxia;  2. Continue with Decadron Zithromax and Rocephin. Patient received 1 dose of 12 mg ivermectin. And also another dose of ivermectin 12 mg 1/19  3. On zinc, vit C, vit D  4. Intubate and place on vent if NIV fails will repeat CXR today  5. Renal function improving slightly, will continue to monitor  6. Follow culture results. Thus far no growth  7. Supplemental O2 to keep sats > 93%  8. Aspiration precautions  9.  Continue labs to follow electrolytes, renal function and and blood counts  10. Glucose monitoring and SSI  11. Bronchial hygiene with respiratory therapy techniques, bronchodilators  12.  DVT, SUP prophylaxis

## 2021-01-21 NOTE — PROGRESS NOTES
Patient on room air at rest SpO2=96%. Patient while ambulating on room air SpO2=83%. Patient while ambulating on 1LPM SpO2=92%. Alejandro Mishra

## 2021-01-22 ENCOUNTER — APPOINTMENT (OUTPATIENT)
Dept: GENERAL RADIOLOGY | Age: 66
DRG: 177 | End: 2021-01-22
Attending: PHYSICIAN ASSISTANT
Payer: MEDICARE

## 2021-01-22 LAB
ANION GAP SERPL CALC-SCNC: 12 MMOL/L (ref 5–15)
BACTERIA SPEC CULT: NORMAL
BASOPHILS # BLD: 0 K/UL (ref 0–0.1)
BASOPHILS NFR BLD: 0 % (ref 0–1)
BUN SERPL-MCNC: 112 MG/DL (ref 6–20)
BUN/CREAT SERPL: 27 (ref 12–20)
CA-I BLD-MCNC: 9 MG/DL (ref 8.5–10.1)
CHLORIDE SERPL-SCNC: 105 MMOL/L (ref 97–108)
CO2 SERPL-SCNC: 19 MMOL/L (ref 21–32)
CREAT SERPL-MCNC: 4.1 MG/DL (ref 0.7–1.3)
DIFFERENTIAL METHOD BLD: ABNORMAL
EOSINOPHIL # BLD: 0 K/UL (ref 0–0.4)
EOSINOPHIL NFR BLD: 0 % (ref 0–7)
ERYTHROCYTE [DISTWIDTH] IN BLOOD BY AUTOMATED COUNT: 16.2 % (ref 11.5–14.5)
GLUCOSE BLD STRIP.AUTO-MCNC: 117 MG/DL (ref 65–100)
GLUCOSE BLD STRIP.AUTO-MCNC: 126 MG/DL (ref 65–100)
GLUCOSE BLD STRIP.AUTO-MCNC: 128 MG/DL (ref 65–100)
GLUCOSE BLD STRIP.AUTO-MCNC: 138 MG/DL (ref 65–100)
GLUCOSE SERPL-MCNC: 126 MG/DL (ref 65–100)
HCT VFR BLD AUTO: 31 % (ref 36.6–50.3)
HGB BLD-MCNC: 10.5 G/DL (ref 12.1–17)
IMM GRANULOCYTES # BLD AUTO: 0 K/UL (ref 0–0.04)
IMM GRANULOCYTES NFR BLD AUTO: 0 % (ref 0–0.5)
LYMPHOCYTES # BLD: 1.1 K/UL (ref 0.8–3.5)
LYMPHOCYTES NFR BLD: 7 % (ref 12–49)
MCH RBC QN AUTO: 26.3 PG (ref 26–34)
MCHC RBC AUTO-ENTMCNC: 33.9 G/DL (ref 30–36.5)
MCV RBC AUTO: 77.5 FL (ref 80–99)
MONOCYTES # BLD: 0.5 K/UL (ref 0–1)
MONOCYTES NFR BLD: 3 % (ref 5–13)
NEUTS SEG # BLD: 14.8 K/UL (ref 1.8–8)
NEUTS SEG NFR BLD: 90 % (ref 32–75)
NRBC # BLD: 0.13 K/UL (ref 0–0.01)
NRBC BLD-RTO: 0.8 PER 100 WBC
PERFORMED BY, TECHID: ABNORMAL
PLATELET # BLD AUTO: 446 K/UL (ref 150–400)
PMV BLD AUTO: 9.7 FL (ref 8.9–12.9)
POTASSIUM SERPL-SCNC: 5.1 MMOL/L (ref 3.5–5.1)
RBC # BLD AUTO: 4 M/UL (ref 4.1–5.7)
RBC MORPH BLD: ABNORMAL
SODIUM SERPL-SCNC: 136 MMOL/L (ref 136–145)
SPECIAL REQUESTS,SREQ: NORMAL
WBC # BLD AUTO: 16.4 K/UL (ref 4.1–11.1)

## 2021-01-22 PROCEDURE — 80048 BASIC METABOLIC PNL TOTAL CA: CPT

## 2021-01-22 PROCEDURE — 85025 COMPLETE CBC W/AUTO DIFF WBC: CPT

## 2021-01-22 PROCEDURE — 82962 GLUCOSE BLOOD TEST: CPT

## 2021-01-22 PROCEDURE — 74011636637 HC RX REV CODE- 636/637: Performed by: PHYSICIAN ASSISTANT

## 2021-01-22 PROCEDURE — 36415 COLL VENOUS BLD VENIPUNCTURE: CPT

## 2021-01-22 PROCEDURE — 97116 GAIT TRAINING THERAPY: CPT

## 2021-01-22 PROCEDURE — 87040 BLOOD CULTURE FOR BACTERIA: CPT

## 2021-01-22 PROCEDURE — 71045 X-RAY EXAM CHEST 1 VIEW: CPT

## 2021-01-22 PROCEDURE — 74011250636 HC RX REV CODE- 250/636: Performed by: FAMILY MEDICINE

## 2021-01-22 PROCEDURE — 74011250637 HC RX REV CODE- 250/637: Performed by: PHYSICIAN ASSISTANT

## 2021-01-22 PROCEDURE — 65270000029 HC RM PRIVATE

## 2021-01-22 RX ADMIN — HEPARIN SODIUM 5000 UNITS: 5000 INJECTION INTRAVENOUS; SUBCUTANEOUS at 14:13

## 2021-01-22 RX ADMIN — AMOXICILLIN AND CLAVULANATE POTASSIUM 1 TABLET: 500; 125 TABLET, FILM COATED ORAL at 20:50

## 2021-01-22 RX ADMIN — HEPARIN SODIUM 5000 UNITS: 5000 INJECTION INTRAVENOUS; SUBCUTANEOUS at 06:18

## 2021-01-22 RX ADMIN — FAMOTIDINE 20 MG: 20 TABLET, FILM COATED ORAL at 20:50

## 2021-01-22 RX ADMIN — HYDRALAZINE HYDROCHLORIDE 100 MG: 50 TABLET, FILM COATED ORAL at 20:52

## 2021-01-22 RX ADMIN — HYDRALAZINE HYDROCHLORIDE 100 MG: 50 TABLET, FILM COATED ORAL at 17:28

## 2021-01-22 RX ADMIN — HYDRALAZINE HYDROCHLORIDE 100 MG: 50 TABLET, FILM COATED ORAL at 09:54

## 2021-01-22 RX ADMIN — FAMOTIDINE 20 MG: 20 TABLET, FILM COATED ORAL at 09:54

## 2021-01-22 RX ADMIN — AMLODIPINE BESYLATE 10 MG: 5 TABLET ORAL at 09:54

## 2021-01-22 RX ADMIN — LEVOTHYROXINE SODIUM 50 MCG: 0.03 TABLET ORAL at 09:54

## 2021-01-22 RX ADMIN — OXYCODONE HYDROCHLORIDE AND ACETAMINOPHEN 500 MG: 500 TABLET ORAL at 09:54

## 2021-01-22 RX ADMIN — AZITHROMYCIN MONOHYDRATE 500 MG: 500 TABLET ORAL at 09:54

## 2021-01-22 RX ADMIN — CALCITRIOL CAPSULES 0.25 MCG 0.25 MCG: 0.25 CAPSULE ORAL at 10:01

## 2021-01-22 RX ADMIN — HEPARIN SODIUM 5000 UNITS: 5000 INJECTION INTRAVENOUS; SUBCUTANEOUS at 20:52

## 2021-01-22 RX ADMIN — AMOXICILLIN AND CLAVULANATE POTASSIUM 1 TABLET: 500; 125 TABLET, FILM COATED ORAL at 09:54

## 2021-01-22 RX ADMIN — CHOLECALCIFEROL TAB 125 MCG (5000 UNIT) 5000 UNITS: 125 TAB at 09:54

## 2021-01-22 RX ADMIN — PREDNISONE 20 MG: 20 TABLET ORAL at 09:54

## 2021-01-22 RX ADMIN — ALLOPURINOL 100 MG: 100 TABLET ORAL at 09:55

## 2021-01-22 RX ADMIN — Medication 1 CAPSULE: at 10:01

## 2021-01-22 NOTE — PROGRESS NOTES
RENATA recv'd a message from representative ([facility] [employer]). Patient needs to call into [place] to go over insurance issues and co-payments. [phone], option 5 for intake. RENATA to relay the message to the patient.        Baylee Hendricks, MSW

## 2021-01-22 NOTE — PROGRESS NOTES
Hospitalist Progress Note    Subjective:   Daily Progress Note: 1/22/2021 8:26 AM    Hospital Course: Patient is a 69-year-old black male with a history of type 2 diabetes, hypertension, CKD stage IV presented to the ER on 1/15/2021 for generalized weakness, nonproductive cough, shortness of breath. Patient reports that he tested positive for Covid several days ago. In the last 24 hours he had decreased oral intake, loss of appetite, worsening shortness of breath, subjective fever and chills. In the ED patient's oxygen saturation was in the 80s while ambulating and therefore was placed on supplemental oxygen. Chest x-ray showed signs concerning for bilateral pneumonia suspicious for COVID-19. He was given 1 L of hydration with 1 dose of Decadron. D-dimer elevated 2.69. , lactic acid 1.2, procalcitonin 0.28, C-reactive protein 13.50. Patient continues on supplemental oxygen. Patient is on IV Decadron, azithromycin, Rocephin. on zinc, vitamin C, vitamin D. Pulmonary Consulted. Oxygen demands are improving. Will try exercise test monitoring oxygen saturation pending discharge. Still with hypothermia and oxygen requirement with activity    Subjective: Patient denies any SOB except with excercise, chest pain. Does have a cough, nonproductive.      Current Facility-Administered Medications   Medication Dose Route Frequency    azithromycin (ZITHROMAX) tablet 500 mg  500 mg Oral DAILY    amoxicillin-clavulanate (AUGMENTIN) 500-125 mg per tablet 1 Tab  1 Tab Oral Q12H    predniSONE (DELTASONE) tablet 20 mg  20 mg Oral DAILY WITH BREAKFAST    zinc sulfate (ZINCATE) 220 (50) mg capsule 1 Cap  1 Cap Oral DAILY    ascorbic acid (vitamin C) (VITAMIN C) tablet 500 mg  500 mg Oral DAILY    cholecalciferol (VITAMIN D3) tablet 5,000 Units  5,000 Units Oral DAILY    allopurinoL (ZYLOPRIM) tablet 100 mg  100 mg Oral DAILY    amLODIPine (NORVASC) tablet 10 mg  10 mg Oral DAILY    [Held by provider] atenoloL (TENORMIN) tablet 50 mg  50 mg Oral DAILY    calcitRIOL (ROCALTROL) capsule 0.25 mcg  0.25 mcg Oral DAILY    famotidine (PEPCID) tablet 20 mg  20 mg Oral BID    hydrALAZINE (APRESOLINE) tablet 100 mg  100 mg Oral TID    levothyroxine (SYNTHROID) tablet 50 mcg  50 mcg Oral ACB    acetaminophen (TYLENOL) tablet 650 mg  650 mg Oral Q6H PRN    Or    acetaminophen (TYLENOL) suppository 650 mg  650 mg Rectal Q6H PRN    polyethylene glycol (MIRALAX) packet 17 g  17 g Oral DAILY PRN    promethazine (PHENERGAN) tablet 12.5 mg  12.5 mg Oral Q6H PRN    heparin (porcine) injection 5,000 Units  5,000 Units SubCUTAneous Q8H    glucose chewable tablet 16 g  4 Tab Oral PRN    dextrose (D50W) injection syrg 12.5-25 g  25-50 mL IntraVENous PRN    glucagon (GLUCAGEN) injection 1 mg  1 mg IntraMUSCular PRN    insulin lispro (HUMALOG) injection   SubCUTAneous AC&HS        Review of Systems  Constitutional: No fevers, No chills, No sweats, No fatigue, +Weakness  Eyes: No redness  Ears, nose, mouth, throat, and face: No nasal congestion, No sore throat, No voice change  Respiratory: No Shortness of Breath, ++ cough, No wheezing  Cardiovascular: No chest pain, No palpitations, No extremity edema  Gastrointestinal: No nausea, No vomiting, No diarrhea, No abdominal pain  Genitourinary: No frequency, No dysuria, No hematuria  Integument/breast: No skin lesion(s)   Neurological: No Confusion, No headaches, No dizziness      Objective:     Visit Vitals  BP (!) 150/83 (BP 1 Location: Left arm, BP Patient Position: At rest)   Pulse (!) 103   Temp (!) 96.1 °F (35.6 °C)   Resp 18   Ht 5' 7\" (1.702 m)   Wt 114.3 kg (252 lb)   SpO2 97%   BMI 39.47 kg/m²    O2 Flow Rate (L/min): 1 l/min O2 Device: Room air    Temp (24hrs), Av.3 °F (35.7 °C), Min:94.6 °F (34.8 °C), Max:97.6 °F (36.4 °C)      No intake/output data recorded.    1901 -  0700  In: -   Out: 1000 [Urine:1000]    PHYSICAL EXAM:  Constitutional: No acute distress  Skin: Extremities and face reveal no rashes. HEENT: Sclerae anicteric. Extra-occular muscles are intact. No oral ulcers  Cardiovascular: RRR   Respiratory:  Nonlabored, diminished  GI: Abdomen nondistended, soft, and nontender. Normal active bowel sounds. Musculoskeletal: No pitting edema of the lower legs. Able to move all ext  Neurological:  Patient is alert and oriented. Cranial nerves II-XII grossly intact  Psychiatric: Mood appears appropriate       Data Review    Recent Results (from the past 24 hour(s))   GLUCOSE, POC    Collection Time: 01/21/21  9:35 PM   Result Value Ref Range    Glucose (POC) 156 (H) 65 - 100 mg/dL    Performed by [address]ung [address], POC    Collection Time: 01/22/21  8:54 AM   Result Value Ref Range    Glucose (POC) 126 (H) 65 - 100 mg/dL    Performed by Duane Lindsey    GLUCOSE, POC    Collection Time: 01/22/21 12:42 PM   Result Value Ref Range    Glucose (POC) 117 (H) 65 - 100 mg/dL    Performed by Neoga Officer    CBC WITH AUTOMATED DIFF    Collection Time: 01/22/21  2:06 PM   Result Value Ref Range    WBC 16.4 (H) 4.1 - 11.1 K/uL    RBC 4.00 (L) 4.10 - 5.70 M/uL    HGB 10.5 (L) 12.1 - 17.0 g/dL    HCT 31.0 (L) 36.6 - 50.3 %    MCV 77.5 (L) 80.0 - 99.0 FL    MCH 26.3 26.0 - 34.0 PG    MCHC 33.9 30.0 - 36.5 g/dL    RDW 16.2 (H) 11.5 - 14.5 %    PLATELET 892 (H) 628 - 400 K/uL    MPV 9.7 8.9 - 12.9 FL    NRBC 0.8 (H) 0  WBC    ABSOLUTE NRBC 0.13 (H) 0.00 - 0.01 K/uL    NEUTROPHILS PENDING %    LYMPHOCYTES PENDING %    MONOCYTES PENDING %    EOSINOPHILS PENDING %    BASOPHILS PENDING %    IMMATURE GRANULOCYTES PENDING %    ABS. NEUTROPHILS PENDING K/UL    ABS. LYMPHOCYTES PENDING K/UL    ABS. MONOCYTES PENDING K/UL    ABS. EOSINOPHILS PENDING K/UL    ABS. BASOPHILS PENDING K/UL    ABS. IMM. GRANS.  PENDING K/UL    DF PENDING    METABOLIC PANEL, BASIC    Collection Time: 01/22/21  2:06 PM   Result Value Ref Range    Sodium 136 136 - 145 mmol/L    Potassium 5.1 3.5 - 5.1 mmol/L    Chloride 105 97 - 108 mmol/L    CO2 19 (L) 21 - 32 mmol/L    Anion gap 12 5 - 15 mmol/L    Glucose 126 (H) 65 - 100 mg/dL     (H) 6 - 20 mg/dL    Creatinine 4.10 (H) 0.70 - 1.30 mg/dL    BUN/Creatinine ratio 27 (H) 12 - 20      GFR est AA 18 (L) >60 ml/min/1.73m2    GFR est non-AA 15 (L) >60 ml/min/1.73m2    Calcium 9.0 8.5 - 10.1 mg/dL       Radiology review: Chest xray      Assessment/Plan:    1. Bilateral pneumonia concerning for COVID-19  Oxygen saturation within normal limits on 3L. Reduced to 1L for oxygen trial.   IV Rocephin and azithromycin,  IV Decadron. On zinc, vitamin C, vitamin D. Consult pulmonology. Per pulmonology due to renal function patient is not a candidate for remdesivir  Continue ivermectin per pulmonary  Decrease decadron  Pulse oximetry exercise test Exercise was 83% on RA, will need home O2    2. Type 2 diabetes  Blood glucose level stable. Continue with insulin sliding scale. May need to make further adjustments as he is on IV steroids. 3.  Hypertension  BP stable. Holding atenolol due to bradycardia. On norvasc and hydralzine. 4. CKD stage IV  We will continue to monitor. BUN and Cr stable    5. Hypothyroidism  On synthroid. TSH within normal limits    6. CBC and BMP in the AM   7. PT/OT     CODE STATUS Full      DVT prophylaxis: Heparin  Ulcer prophylaxis: Protonix    Care Plan discussed with: Patient/Family and Nurse     [phone] Nanci Harrison, expressing concerns for [facility] and/or rehab    Discussed case with nanci. Plan for discharge tomorrow pending SNF versus home health  Total time spent with patient: 25 minutes.

## 2021-01-22 NOTE — PROGRESS NOTES
Pulmonary  Progress Note      NAME: Angelika Calderon   :  1955  MRM:  [id number]    Date/Time: 2021  9:30 AM         Subjective:   I was asked by Marquis Forrest MD to see Angelika Calderon  a 77 y.o.    male in consultation      Excerpts from admission 1/15/2021 or consult notes as follows:   69-year-old male came in because of shortness of breath and dyspnea nonproductive cough patient was tested positive for COVID-19 several days ago then he started having worsening of his symptoms of shortness of breath dyspnea cough chest x-ray shows bilateral infiltrate consistent with COVID-19 he was hypoxic he was put on oxygen via nasal cannula 4 L so now pulmonary consult was called for further evaluation.        No Known Allergies      MAR reviewed and pertinent medications noted or modified as needed          Current Facility-Administered Medications   Medication    zinc sulfate (ZINCATE) 220 (50) mg capsule 1 Cap    ascorbic acid (vitamin C) (VITAMIN C) tablet 500 mg    cholecalciferol (VITAMIN D3) tablet 5,000 Units    dexamethasone (DECADRON) 4 mg/mL injection 4 mg    allopurinoL (ZYLOPRIM) tablet 100 mg    amLODIPine (NORVASC) tablet 10 mg    [Held by provider] atenoloL (TENORMIN) tablet 50 mg    calcitRIOL (ROCALTROL) capsule 0.25 mcg    famotidine (PEPCID) tablet 20 mg    hydrALAZINE (APRESOLINE) tablet 100 mg    levothyroxine (SYNTHROID) tablet 50 mcg    sodium chloride (NS) flush 5-40 mL    sodium chloride (NS) flush 5-40 mL    acetaminophen (TYLENOL) tablet 650 mg     Or    acetaminophen (TYLENOL) suppository 650 mg    polyethylene glycol (MIRALAX) packet 17 g    promethazine (PHENERGAN) tablet 12.5 mg     Or    ondansetron (ZOFRAN) injection 4 mg    heparin (porcine) injection 5,000 Units    azithromycin (ZITHROMAX) 500 mg in 0.9% sodium chloride 250 mL (VIAL-MATE)    cefTRIAXone (ROCEPHIN) 1 g in sterile water (preservative free) 10 mL IV syringe    glucose chewable tablet 16 g    dextrose (D50W) injection syrg 12.5-25 g    glucagon (GLUCAGEN) injection 1 mg    insulin lispro (HUMALOG) injection      Patient PCP: Manpreet Posada DO  PMH:  has a past medical history of CKD (chronic kidney disease) stage 4, GFR 15-29 ml/min ([facility] [address] DM (diabetes mellitus) ([facility] [address] H/O cervical spine surgery, and HTN (hypertension). PSH:   has a past surgical history that includes hx orthopaedic. FHX: family history includes Coronary Artery Disease in his mother; Heart Failure in his mother; Pacemaker in his sister. SHX:  reports that he has never smoked. He has never used smokeless tobacco. He reports previous alcohol use. He reports previous drug use. ROS:     Review of Systems   Constitutional: Positive for malaise/fatigue. HENT: Negative. Eyes: Negative. Respiratory: Positive for cough and shortness of breath. Cardiovascular: Positive for orthopnea. Gastrointestinal: Negative. Genitourinary: Negative. Musculoskeletal: Negative. Neurological: Negative. Endo/Heme/Allergies: Negative. Psychiatric/Behavioral: Negative. Objective:       Vitals:      Last 24hrs VS reviewed since prior progress note. Most recent are:    Visit Vitals  BP (!) 149/98 (BP 1 Location: Left arm, BP Patient Position: Sitting)   Pulse (!) 108   Temp 97.4 °F (36.3 °C)   Resp 18   Ht 5' 7\" (1.702 m)   Wt 114.3 kg (252 lb)   SpO2 100%   BMI 39.47 kg/m²     SpO2 Readings from Last 6 Encounters:   01/22/21 100%    O2 Flow Rate (L/min): 1 l/min       Intake/Output Summary (Last 24 hours) at 1/22/2021 1026  Last data filed at 1/21/2021 1932  Gross per 24 hour   Intake    Output 400 ml   Net -400 ml          Exam:   Physical Exam   Constitutional: He appears distressed. HENT:   Head: Normocephalic and atraumatic. Eyes: Pupils are equal, round, and reactive to light. Conjunctivae are normal.   Neck: Normal range of motion. Neck supple.    Cardiovascular: Normal rate and regular rhythm. Pulmonary/Chest: He is in respiratory distress. Abdominal: Soft. Bowel sounds are normal.   Musculoskeletal:         General: Edema present. Neurological: He is alert.              Lab Data Reviewed: (see below)      Medications:  Current Facility-Administered Medications   Medication Dose Route Frequency    azithromycin (ZITHROMAX) tablet 500 mg  500 mg Oral DAILY    amoxicillin-clavulanate (AUGMENTIN) 500-125 mg per tablet 1 Tab  1 Tab Oral Q12H    predniSONE (DELTASONE) tablet 20 mg  20 mg Oral DAILY WITH BREAKFAST    zinc sulfate (ZINCATE) 220 (50) mg capsule 1 Cap  1 Cap Oral DAILY    ascorbic acid (vitamin C) (VITAMIN C) tablet 500 mg  500 mg Oral DAILY    cholecalciferol (VITAMIN D3) tablet 5,000 Units  5,000 Units Oral DAILY    allopurinoL (ZYLOPRIM) tablet 100 mg  100 mg Oral DAILY    amLODIPine (NORVASC) tablet 10 mg  10 mg Oral DAILY    [Held by provider] atenoloL (TENORMIN) tablet 50 mg  50 mg Oral DAILY    calcitRIOL (ROCALTROL) capsule 0.25 mcg  0.25 mcg Oral DAILY    famotidine (PEPCID) tablet 20 mg  20 mg Oral BID    hydrALAZINE (APRESOLINE) tablet 100 mg  100 mg Oral TID    levothyroxine (SYNTHROID) tablet 50 mcg  50 mcg Oral ACB    acetaminophen (TYLENOL) tablet 650 mg  650 mg Oral Q6H PRN    Or    acetaminophen (TYLENOL) suppository 650 mg  650 mg Rectal Q6H PRN    polyethylene glycol (MIRALAX) packet 17 g  17 g Oral DAILY PRN    promethazine (PHENERGAN) tablet 12.5 mg  12.5 mg Oral Q6H PRN    heparin (porcine) injection 5,000 Units  5,000 Units SubCUTAneous Q8H    glucose chewable tablet 16 g  4 Tab Oral PRN    dextrose (D50W) injection syrg 12.5-25 g  25-50 mL IntraVENous PRN    glucagon (GLUCAGEN) injection 1 mg  1 mg IntraMUSCular PRN    insulin lispro (HUMALOG) injection   SubCUTAneous AC&HS       ______________________________________________________________________      Lab Review:     Recent Labs     01/19/21  1357   WBC 13.3*   HGB 11.6*   HCT 34.7*   *     Recent Labs     01/19/21  1357   *   K 4.3      CO2 18*   *   BUN 87*   CREA 4.17*   CA 8.6     No components found for: GLPOC  No results for input(s): PH, PCO2, PO2, HCO3, FIO2 in the last 72 hours. No results for input(s): INR, INREXT, INREXT, INREXT in the last 72 hours. IMPRESSION:   1. Acute hypoxic respiratory failure on 1 LNC  2. COVID-19 pneumonia  3. History of hypertension diabetes mellitus  5. Body mass index is 39.47 kg/m². 4. Acute on chronic kidney disease creatinine is 5 his GFR is 14 not a candidate for remdesivir   5. He recieved ivermectin 12 mg 1 dose and another dose 1/19  6. Bradycardia resolved off beta-blocker  7. Pt is requiring Drug therapy requiring intensive monitoring for toxicity  8. Pt is unstable, unpredictable needing inpatient monitoring; is acutely ill and at high risk of sudden decline and decompensation with severe consequenses and continued end organ dysfunction and failure  9. Prognosis guarded        RECOMMENDATIONS/PLAN:     1. Patient is on 1 L nasal Cannula oxygen as salvage oxygen delivery device to provide high concentration of oxygen to overcome refractory hypoxia;  2. Continue with Decadron Zithromax and Rocephin. Patient received 1 dose of 12 mg ivermectin. And also another dose of ivermectin 12 mg 1/19  3. On zinc, vit C, vit D  4. His oxygen level still low he needs to be on oxygen when discharged home 2 L nasal cannula  5. Renal function improving slightly, will continue to monitor  6. Follow culture results. Thus far no growth  7. Supplemental O2 to keep sats > 93%  8. Aspiration precautions  9. Continue labs to follow electrolytes, renal function and and blood counts  10. Glucose monitoring and SSI  11. Bronchial hygiene with respiratory therapy techniques, bronchodilators  12.  DVT, SUP prophylaxis

## 2021-01-22 NOTE — PROGRESS NOTES
PHYSICAL THERAPY TREATMENT  Patient: Jay Jay (78 y.o. male)  Date: 1/22/2021  Diagnosis: COVID-19 [U07.1]  Bilateral pneumonia [J18.9]  Acute respiratory failure with hypoxia ([facility] [address] [J96.01] <principal problem not specified>       Precautions:    Chart, physical therapy assessment, plan of care and goals were reviewed. ASSESSMENT  Patient continues with skilled PT services and is slowly progressing towards goals. Pt semi supine in bed upon arrival and agreeable to session. Completed bed mobility with SBA. STS required CGA. Pt ambulated from bed>door>chair with RW and CGAx1, no LOB or knee buckling noted. While seated in chair patient performed LE therex, see details below. Current Level of Function Impacting Discharge (mobility/balance): Other factors to consider for discharge: decreased activity tolerance, PLOF, Family assist         PLAN :  Patient continues to benefit from skilled intervention to address the above impairments. Continue treatment per established plan of care. to address goals. Recommendation for discharge: (in order for the patient to meet his/her long term goals)  Physical therapy at least 2 days/week in the home     This discharge recommendation:  Has been made in collaboration with the attending provider and/or case management    IF patient discharges home will need the following DME: rolling walker       SUBJECTIVE:   Patient stated i'll sit up in the chair for a little bit    OBJECTIVE DATA SUMMARY:   Critical Behavior:  Neurologic State: Alert  Orientation Level: Oriented X4  Cognition: Appropriate decision making  Safety/Judgement: Good awareness of safety precautions    Functional Mobility Training:  Bed Mobility:  Supine to Sit: Stand-by assistance  Scooting: Stand-by assistance    Transfers:  Sit to Stand: Contact guard assistance  Stand to Sit: Contact guard assistance  Bed to Chair: Contact guard assistance    Balance:  Sitting: Intact; Without support  Standing: Intact; With support    Ambulation/Gait Training:  Distance (ft): 15 Feet (ft)  Assistive Device: Gait belt;Walker, rolling  Ambulation - Level of Assistance: Contact guard assistance      Therapeutic Exercises:   1 x 5 AP  1 x 5 LAQ  1 x 5 Marchnadeen    Pain Ratin/10    Activity Tolerance:   Fair  Please refer to the flowsheet for vital signs taken during this treatment. After treatment patient left in no apparent distress:   Sitting in chair and Call bell within reach    COMMUNICATION/COLLABORATION:   The patients plan of care was discussed with: Registered nurse. Problem: Mobility Impaired (Adult and Pediatric)  Goal: *Acute Goals and Plan of Care (Insert Text)  Description: Pt will be I with LE HEP in 7 days. Pt will perform bed mobility with mod I in 7 days. Pt will perform transfers with mod I in 7 days. Pt will amb  feet with LRAD safely with mod I in 7 days.          Outcome: Progressing Towards Goal       Matt Maple, PTA   Time Calculation: 16 mins

## 2021-01-22 NOTE — PROGRESS NOTES
Problem: Falls - Risk of  Goal: *Absence of Falls  Description: Document Brock Fall Risk and appropriate interventions in the flowsheet.  Outcome: Progressing Towards Goal  Note: Fall Risk Interventions:Bed is in the lowest position and wheels are locked, call bell is within reach, bathroom light is on during evening hours, gripper socks are on and patient has been instructed to call out for assistance if needed.     As of now, patient is free from falls and will continue to be monitored.        Bedside shift change report given to AUGUSTINE Sexton (oncoming nurse) by AUGUSTINE Benson (offgoing nurse). Report included the following information SBAR, Kardex, Intake/Output, MAR, Accordion and Recent Results.

## 2021-01-22 NOTE — PROGRESS NOTES
Pulmonary  Progress Note      NAME: Mirlande Arroyo   :  1955  MRM:  [id number]    Date/Time: 2021  9:30 AM         Subjective:   I was asked by Duane Weathers MD to see Mirlande Arroyo  a 77 y.o.    male in consultation      Excerpts from admission 1/15/2021 or consult notes as follows:   75-year-old male came in because of shortness of breath and dyspnea nonproductive cough patient was tested positive for COVID-19 several days ago then he started having worsening of his symptoms of shortness of breath dyspnea cough chest x-ray shows bilateral infiltrate consistent with COVID-19 he was hypoxic he was put on oxygen via nasal cannula 4 L so now pulmonary consult was called for further evaluation.        No Known Allergies      MAR reviewed and pertinent medications noted or modified as needed          Current Facility-Administered Medications   Medication    zinc sulfate (ZINCATE) 220 (50) mg capsule 1 Cap    ascorbic acid (vitamin C) (VITAMIN C) tablet 500 mg    cholecalciferol (VITAMIN D3) tablet 5,000 Units    dexamethasone (DECADRON) 4 mg/mL injection 4 mg    allopurinoL (ZYLOPRIM) tablet 100 mg    amLODIPine (NORVASC) tablet 10 mg    [Held by provider] atenoloL (TENORMIN) tablet 50 mg    calcitRIOL (ROCALTROL) capsule 0.25 mcg    famotidine (PEPCID) tablet 20 mg    hydrALAZINE (APRESOLINE) tablet 100 mg    levothyroxine (SYNTHROID) tablet 50 mcg    sodium chloride (NS) flush 5-40 mL    sodium chloride (NS) flush 5-40 mL    acetaminophen (TYLENOL) tablet 650 mg     Or    acetaminophen (TYLENOL) suppository 650 mg    polyethylene glycol (MIRALAX) packet 17 g    promethazine (PHENERGAN) tablet 12.5 mg     Or    ondansetron (ZOFRAN) injection 4 mg    heparin (porcine) injection 5,000 Units    azithromycin (ZITHROMAX) 500 mg in 0.9% sodium chloride 250 mL (VIAL-MATE)    cefTRIAXone (ROCEPHIN) 1 g in sterile water (preservative free) 10 mL IV syringe    glucose chewable tablet 16 g    dextrose (D50W) injection syrg 12.5-25 g    glucagon (GLUCAGEN) injection 1 mg    insulin lispro (HUMALOG) injection      Patient PCP: Kwame Zaragoza DO  PMH:  has a past medical history of CKD (chronic kidney disease) stage 4, GFR 15-29 ml/min ([facility] [address] DM (diabetes mellitus) ([facility] [address] H/O cervical spine surgery, and HTN (hypertension). PSH:   has a past surgical history that includes hx orthopaedic. FHX: family history includes Coronary Artery Disease in his mother; Heart Failure in his mother; Pacemaker in his sister. SHX:  reports that he has never smoked. He has never used smokeless tobacco. He reports previous alcohol use. He reports previous drug use. ROS:     Review of Systems   Constitutional: Positive for malaise/fatigue. HENT: Negative. Eyes: Negative. Respiratory: Positive for cough and shortness of breath. Cardiovascular: Positive for orthopnea. Gastrointestinal: Negative. Genitourinary: Negative. Musculoskeletal: Negative. Neurological: Negative. Endo/Heme/Allergies: Negative. Psychiatric/Behavioral: Negative. Objective:       Vitals:      Last 24hrs VS reviewed since prior progress note. Most recent are:    Visit Vitals  BP (!) 151/86 (BP 1 Location: Left arm, BP Patient Position: At rest)   Pulse (!) 101   Temp 97.6 °F (36.4 °C)   Resp 16   Ht 5' 7\" (1.702 m)   Wt 252 lb (114.3 kg)   SpO2 95%   BMI 39.47 kg/m²     SpO2 Readings from Last 6 Encounters:   01/22/21 95%    O2 Flow Rate (L/min): 1 l/min       Intake/Output Summary (Last 24 hours) at 1/22/2021 0848  Last data filed at 1/21/2021 1932  Gross per 24 hour   Intake    Output 400 ml   Net -400 ml          Exam:   Physical Exam   Constitutional: He appears distressed. HENT:   Head: Normocephalic and atraumatic. Eyes: Pupils are equal, round, and reactive to light. Conjunctivae are normal.   Neck: Normal range of motion. Neck supple.    Cardiovascular: Normal rate and regular rhythm. Pulmonary/Chest: He is in respiratory distress. Abdominal: Soft. Bowel sounds are normal.   Musculoskeletal:         General: Edema present. Neurological: He is alert.              Lab Data Reviewed: (see below)      Medications:  Current Facility-Administered Medications   Medication Dose Route Frequency    azithromycin (ZITHROMAX) tablet 500 mg  500 mg Oral DAILY    amoxicillin-clavulanate (AUGMENTIN) 500-125 mg per tablet 1 Tab  1 Tab Oral Q12H    predniSONE (DELTASONE) tablet 20 mg  20 mg Oral DAILY WITH BREAKFAST    zinc sulfate (ZINCATE) 220 (50) mg capsule 1 Cap  1 Cap Oral DAILY    ascorbic acid (vitamin C) (VITAMIN C) tablet 500 mg  500 mg Oral DAILY    cholecalciferol (VITAMIN D3) tablet 5,000 Units  5,000 Units Oral DAILY    allopurinoL (ZYLOPRIM) tablet 100 mg  100 mg Oral DAILY    amLODIPine (NORVASC) tablet 10 mg  10 mg Oral DAILY    [Held by provider] atenoloL (TENORMIN) tablet 50 mg  50 mg Oral DAILY    calcitRIOL (ROCALTROL) capsule 0.25 mcg  0.25 mcg Oral DAILY    famotidine (PEPCID) tablet 20 mg  20 mg Oral BID    hydrALAZINE (APRESOLINE) tablet 100 mg  100 mg Oral TID    levothyroxine (SYNTHROID) tablet 50 mcg  50 mcg Oral ACB    acetaminophen (TYLENOL) tablet 650 mg  650 mg Oral Q6H PRN    Or    acetaminophen (TYLENOL) suppository 650 mg  650 mg Rectal Q6H PRN    polyethylene glycol (MIRALAX) packet 17 g  17 g Oral DAILY PRN    promethazine (PHENERGAN) tablet 12.5 mg  12.5 mg Oral Q6H PRN    heparin (porcine) injection 5,000 Units  5,000 Units SubCUTAneous Q8H    glucose chewable tablet 16 g  4 Tab Oral PRN    dextrose (D50W) injection syrg 12.5-25 g  25-50 mL IntraVENous PRN    glucagon (GLUCAGEN) injection 1 mg  1 mg IntraMUSCular PRN    insulin lispro (HUMALOG) injection   SubCUTAneous AC&HS       ______________________________________________________________________      Lab Review:     Recent Labs     01/19/21  1357   WBC 13.3*   HGB 11.6*   HCT 34.7*   *     Recent Labs     01/19/21  1357   *   K 4.3      CO2 18*   *   BUN 87*   CREA 4.17*   CA 8.6     No components found for: GLPOC  No results for input(s): PH, PCO2, PO2, HCO3, FIO2 in the last 72 hours. No results for input(s): INR, INREXT, INREXT, INREXT in the last 72 hours. IMPRESSION:   1. Acute hypoxic respiratory failure on 1 LNC  2. COVID-19 pneumonia  3. History of hypertension diabetes mellitus  5. Body mass index is 39.47 kg/m². 4. Acute on chronic kidney disease creatinine is 5 his GFR is 14 not a candidate for remdesivir   5. He recieved ivermectin 12 mg 1 dose and another dose 1/19  6. Bradycardia resolved off beta-blocker  7. Pt is requiring Drug therapy requiring intensive monitoring for toxicity  8. Pt is unstable, unpredictable needing inpatient monitoring; is acutely ill and at high risk of sudden decline and decompensation with severe consequenses and continued end organ dysfunction and failure  9. Prognosis guarded        RECOMMENDATIONS/PLAN:     1. Patient is on 1 L nasal Cannula oxygen as salvage oxygen delivery device to provide high concentration of oxygen to overcome refractory hypoxia;  2. Continue with Decadron Zithromax and Rocephin. Patient received 1 dose of 12 mg ivermectin. And also another dose of ivermectin 12 mg 1/19  3. On zinc, vit C, vit D  4. Renal function improving slightly, will continue to monitor  5. Follow culture results. Thus far no growth  6. Supplemental O2 to keep sats > 93%  7. Aspiration precautions  8. Continue labs to follow electrolytes, renal function and and blood counts  9. Glucose monitoring and SSI  10. Bronchial hygiene with respiratory therapy techniques, bronchodilators  11.  DVT, SUP prophylaxis

## 2021-01-23 LAB
GLUCOSE BLD STRIP.AUTO-MCNC: 112 MG/DL (ref 65–100)
GLUCOSE BLD STRIP.AUTO-MCNC: 115 MG/DL (ref 65–100)
GLUCOSE BLD STRIP.AUTO-MCNC: 147 MG/DL (ref 65–100)
GLUCOSE BLD STRIP.AUTO-MCNC: 150 MG/DL (ref 65–100)
PERFORMED BY, TECHID: ABNORMAL

## 2021-01-23 PROCEDURE — 65270000029 HC RM PRIVATE

## 2021-01-23 PROCEDURE — 74011250636 HC RX REV CODE- 250/636: Performed by: FAMILY MEDICINE

## 2021-01-23 PROCEDURE — 82962 GLUCOSE BLOOD TEST: CPT

## 2021-01-23 PROCEDURE — 74011250637 HC RX REV CODE- 250/637: Performed by: PHYSICIAN ASSISTANT

## 2021-01-23 PROCEDURE — 74011636637 HC RX REV CODE- 636/637: Performed by: PHYSICIAN ASSISTANT

## 2021-01-23 RX ADMIN — CHOLECALCIFEROL TAB 125 MCG (5000 UNIT) 5000 UNITS: 125 TAB at 09:54

## 2021-01-23 RX ADMIN — OXYCODONE HYDROCHLORIDE AND ACETAMINOPHEN 500 MG: 500 TABLET ORAL at 09:54

## 2021-01-23 RX ADMIN — AMLODIPINE BESYLATE 10 MG: 5 TABLET ORAL at 09:54

## 2021-01-23 RX ADMIN — HYDRALAZINE HYDROCHLORIDE 100 MG: 50 TABLET, FILM COATED ORAL at 09:54

## 2021-01-23 RX ADMIN — HEPARIN SODIUM 5000 UNITS: 5000 INJECTION INTRAVENOUS; SUBCUTANEOUS at 22:03

## 2021-01-23 RX ADMIN — FAMOTIDINE 20 MG: 20 TABLET, FILM COATED ORAL at 09:54

## 2021-01-23 RX ADMIN — AZITHROMYCIN MONOHYDRATE 500 MG: 500 TABLET ORAL at 09:54

## 2021-01-23 RX ADMIN — CALCITRIOL CAPSULES 0.25 MCG 0.25 MCG: 0.25 CAPSULE ORAL at 09:54

## 2021-01-23 RX ADMIN — HEPARIN SODIUM 5000 UNITS: 5000 INJECTION INTRAVENOUS; SUBCUTANEOUS at 13:59

## 2021-01-23 RX ADMIN — AMOXICILLIN AND CLAVULANATE POTASSIUM 1 TABLET: 500; 125 TABLET, FILM COATED ORAL at 09:54

## 2021-01-23 RX ADMIN — HYDRALAZINE HYDROCHLORIDE 100 MG: 50 TABLET, FILM COATED ORAL at 22:02

## 2021-01-23 RX ADMIN — LEVOTHYROXINE SODIUM 50 MCG: 0.03 TABLET ORAL at 09:54

## 2021-01-23 RX ADMIN — HEPARIN SODIUM 5000 UNITS: 5000 INJECTION INTRAVENOUS; SUBCUTANEOUS at 06:28

## 2021-01-23 RX ADMIN — FAMOTIDINE 20 MG: 20 TABLET, FILM COATED ORAL at 22:03

## 2021-01-23 RX ADMIN — ALLOPURINOL 100 MG: 100 TABLET ORAL at 09:54

## 2021-01-23 RX ADMIN — HYDRALAZINE HYDROCHLORIDE 100 MG: 50 TABLET, FILM COATED ORAL at 17:27

## 2021-01-23 RX ADMIN — PREDNISONE 20 MG: 20 TABLET ORAL at 09:54

## 2021-01-23 RX ADMIN — AMOXICILLIN AND CLAVULANATE POTASSIUM 1 TABLET: 500; 125 TABLET, FILM COATED ORAL at 22:02

## 2021-01-23 RX ADMIN — Medication 1 CAPSULE: at 09:54

## 2021-01-23 NOTE — PROGRESS NOTES
Hospitalist Progress Note    Subjective:   Daily Progress Note: 1/23/2021 8:26 AM    Hospital Course: Patient is a 47-year-old black male with a history of type 2 diabetes, hypertension, CKD stage IV presented to the ER on 1/15/2021 for generalized weakness, nonproductive cough, shortness of breath. Patient reports that he tested positive for Covid several days ago. In the last 24 hours he had decreased oral intake, loss of appetite, worsening shortness of breath, subjective fever and chills. In the ED patient's oxygen saturation was in the 80s while ambulating and therefore was placed on supplemental oxygen. Chest x-ray showed signs concerning for bilateral pneumonia suspicious for COVID-19. He was given 1 L of hydration with 1 dose of Decadron. D-dimer elevated 2.69. , lactic acid 1.2, procalcitonin 0.28, C-reactive protein 13.50. Patient continues on supplemental oxygen. Patient is on IV Decadron, azithromycin, Rocephin. on zinc, vitamin C, vitamin D. Pulmonary Consulted. Oxygen demands are improving. Will try exercise test monitoring oxygen saturation pending discharge. Still with hypothermia and oxygen requirement with activity    Subjective: Patient denies any SOB except with excercise, chest pain. Does have a cough, nonproductive.      Current Facility-Administered Medications   Medication Dose Route Frequency    azithromycin (ZITHROMAX) tablet 500 mg  500 mg Oral DAILY    amoxicillin-clavulanate (AUGMENTIN) 500-125 mg per tablet 1 Tab  1 Tab Oral Q12H    predniSONE (DELTASONE) tablet 20 mg  20 mg Oral DAILY WITH BREAKFAST    zinc sulfate (ZINCATE) 220 (50) mg capsule 1 Cap  1 Cap Oral DAILY    ascorbic acid (vitamin C) (VITAMIN C) tablet 500 mg  500 mg Oral DAILY    cholecalciferol (VITAMIN D3) tablet 5,000 Units  5,000 Units Oral DAILY    allopurinoL (ZYLOPRIM) tablet 100 mg  100 mg Oral DAILY    amLODIPine (NORVASC) tablet 10 mg  10 mg Oral DAILY    [Held by provider] atenoloL (TENORMIN) tablet 50 mg  50 mg Oral DAILY    calcitRIOL (ROCALTROL) capsule 0.25 mcg  0.25 mcg Oral DAILY    famotidine (PEPCID) tablet 20 mg  20 mg Oral BID    hydrALAZINE (APRESOLINE) tablet 100 mg  100 mg Oral TID    levothyroxine (SYNTHROID) tablet 50 mcg  50 mcg Oral ACB    acetaminophen (TYLENOL) tablet 650 mg  650 mg Oral Q6H PRN    Or    acetaminophen (TYLENOL) suppository 650 mg  650 mg Rectal Q6H PRN    polyethylene glycol (MIRALAX) packet 17 g  17 g Oral DAILY PRN    promethazine (PHENERGAN) tablet 12.5 mg  12.5 mg Oral Q6H PRN    heparin (porcine) injection 5,000 Units  5,000 Units SubCUTAneous Q8H    glucose chewable tablet 16 g  4 Tab Oral PRN    dextrose (D50W) injection syrg 12.5-25 g  25-50 mL IntraVENous PRN    glucagon (GLUCAGEN) injection 1 mg  1 mg IntraMUSCular PRN    insulin lispro (HUMALOG) injection   SubCUTAneous AC&HS        Review of Systems  Constitutional: No fevers, No chills, No sweats, No fatigue, +Weakness  Eyes: No redness  Ears, nose, mouth, throat, and face: No nasal congestion, No sore throat, No voice change  Respiratory: No Shortness of Breath, ++ cough, No wheezing  Cardiovascular: No chest pain, No palpitations, No extremity edema  Gastrointestinal: No nausea, No vomiting, No diarrhea, No abdominal pain  Genitourinary: No frequency, No dysuria, No hematuria  Integument/breast: No skin lesion(s)   Neurological: No Confusion, No headaches, No dizziness      Objective:     Visit Vitals  BP (!) 144/84 (BP 1 Location: Left arm, BP Patient Position: At rest;Sitting)   Pulse (!) 107   Temp (!) 96.2 °F (35.7 °C)   Resp 20   Ht 5' 7\" (1.702 m)   Wt 114.3 kg (252 lb)   SpO2 95%   BMI 39.47 kg/m²    O2 Flow Rate (L/min): 1 l/min O2 Device: Room air    Temp (24hrs), Av.7 °F (35.9 °C), Min:96.1 °F (35.6 °C), Max:97.6 °F (36.4 °C)      01/23 0701 - 1900  In: 360 [P.O.:360]  Out: -   1901 -  07  In: -   Out: 1075 [Urine:1075]    PHYSICAL EXAM:  Constitutional: No acute distress  Skin: Extremities and face reveal no rashes. HEENT: Sclerae anicteric. Extra-occular muscles are intact. No oral ulcers  Cardiovascular: RRR   Respiratory:  Nonlabored, diminished  GI: Abdomen nondistended, soft, and nontender. Normal active bowel sounds. Musculoskeletal: No pitting edema of the lower legs. Able to move all ext  Neurological:  Patient is alert and oriented. Cranial nerves II-XII grossly intact  Psychiatric: Mood appears appropriate       Data Review    Recent Results (from the past 24 hour(s))   GLUCOSE, POC    Collection Time: 01/22/21 12:42 PM   Result Value Ref Range    Glucose (POC) 117 (H) 65 - 100 mg/dL    Performed by Magne Maya    CBC WITH AUTOMATED DIFF    Collection Time: 01/22/21  2:06 PM   Result Value Ref Range    WBC 16.4 (H) 4.1 - 11.1 K/uL    RBC 4.00 (L) 4.10 - 5.70 M/uL    HGB 10.5 (L) 12.1 - 17.0 g/dL    HCT 31.0 (L) 36.6 - 50.3 %    MCV 77.5 (L) 80.0 - 99.0 FL    MCH 26.3 26.0 - 34.0 PG    MCHC 33.9 30.0 - 36.5 g/dL    RDW 16.2 (H) 11.5 - 14.5 %    PLATELET 908 (H) 087 - 400 K/uL    MPV 9.7 8.9 - 12.9 FL    NRBC 0.8 (H) 0  WBC    ABSOLUTE NRBC 0.13 (H) 0.00 - 0.01 K/uL    NEUTROPHILS 90 (H) 32 - 75 %    LYMPHOCYTES 7 (L) 12 - 49 %    MONOCYTES 3 (L) 5 - 13 %    EOSINOPHILS 0 0 - 7 %    BASOPHILS 0 0 - 1 %    IMMATURE GRANULOCYTES 0 0.0 - 0.5 %    ABS. NEUTROPHILS 14.8 (H) 1.8 - 8.0 K/UL    ABS. LYMPHOCYTES 1.1 0.8 - 3.5 K/UL    ABS. MONOCYTES 0.5 0.0 - 1.0 K/UL    ABS. EOSINOPHILS 0.0 0.0 - 0.4 K/UL    ABS. BASOPHILS 0.0 0.0 - 0.1 K/UL    ABS. IMM.  GRANS. 0.0 0.00 - 0.04 K/UL    DF AUTOMATED      RBC COMMENTS Macrocytosis  2+        RBC COMMENTS Ovalocytes  2+        RBC COMMENTS Polychromasia  1+       METABOLIC PANEL, BASIC    Collection Time: 01/22/21  2:06 PM   Result Value Ref Range    Sodium 136 136 - 145 mmol/L    Potassium 5.1 3.5 - 5.1 mmol/L    Chloride 105 97 - 108 mmol/L    CO2 19 (L) 21 - 32 mmol/L    Anion gap 12 5 - 15 mmol/L    Glucose 126 (H) 65 - 100 mg/dL     (H) 6 - 20 mg/dL    Creatinine 4.10 (H) 0.70 - 1.30 mg/dL    BUN/Creatinine ratio 27 (H) 12 - 20      GFR est AA 18 (L) >60 ml/min/1.73m2    GFR est non-AA 15 (L) >60 ml/min/1.73m2    Calcium 9.0 8.5 - 10.1 mg/dL   GLUCOSE, POC    Collection Time: 01/22/21  3:59 PM   Result Value Ref Range    Glucose (POC) 128 (H) 65 - 100 mg/dL    Performed by Simona Davis    GLUCOSE, POC    Collection Time: 01/22/21  8:49 PM   Result Value Ref Range    Glucose (POC) 138 (H) 65 - 100 mg/dL    Performed by Sandy Lisa, POC    Collection Time: 01/23/21  8:08 AM   Result Value Ref Range    Glucose (POC) 112 (H) 65 - 100 mg/dL    Performed by Anita Gaston        Radiology review: Chest xray      Assessment/Plan:    1. Bilateral pneumonia concerning for COVID-19  Oxygen saturation within normal limits on 3L. Reduced to 1L for oxygen trial.   IV Rocephin and azithromycin,  IV Decadron. On zinc, vitamin C, vitamin D. Consult pulmonology. Per pulmonology due to renal function patient is not a candidate for remdesivir  Continue ivermectin per pulmonary  Decrease decadron  Pulse oximetry exercise test Exercise was 83% on RA, will need home O2    2. Type 2 diabetes  Blood glucose level stable. Continue with insulin sliding scale. May need to make further adjustments as he is on IV steroids. 3.  Hypertension  BP stable. Holding atenolol due to bradycardia. On norvasc and hydralzine. 4. CKD stage IV  We will continue to monitor. BUN and Cr stable    5. Hypothyroidism  On synthroid. TSH within normal limits    6. CBC and BMP in the AM   7. PT/OT     8.  Hypothermia  Work up pending:  Blood culutres  CXR normal  TSH normal  Suspect related to Covid    CODE STATUS Full      DVT prophylaxis: Heparin  Ulcer prophylaxis: Protonix    Care Plan discussed with: Patient/Family and Nurse     [phone] Angela Guerrier, expressing concerns for [facility] and/or rehab    Discussed case with nanci. Plan for discharge tomorrow pending SNF versus home health  Total time spent with patient: 26 minutes.

## 2021-01-23 NOTE — PROGRESS NOTES
Problem: Falls - Risk of  Goal: *Absence of Falls  Description: Document Phil Bello Fall Risk and appropriate interventions in the flowsheet. Outcome: Progressing Towards Goal  Note: Fall Risk Interventions:  Bed is in the lowest position and wheels are locked, call bell is within reach, bathroom light is on during evening hours, gripper socks are on and patient has been instructed to call out for assistance if needed. As of now, patient is free from falls and will continue to be monitored. Bedside shift change report given to Stephen Rolon RN (oncoming nurse) by Enzo Bence, RN (offgoing nurse). Report included the following information SBAR, Kardex, Intake/Output, MAR, Accordion and Recent Results.

## 2021-01-23 NOTE — PROGRESS NOTES
CM followed up with the patient at the bedside to discuss SNF vs. HH as no HH agency has accepted the patient at this time. [facility] is pending acceptance but not given an official response. Patient confirmed he does live alone in an apartment but prefers to return home. CM voiced concerns of being alone and weak. Patient feels that he will be okay. Discussed with attending provider and today patient is having periods of hypothermia and will likely not discharge today. CM has reached out to additional [facility] agencies, awaiting response. Also, patient initially qualified for home oxygen as his oxygen levels desaturated with ambulation. Prior to discharge he will need a new ambulating pulse ox as the previous one  and has to completed within 48 hours of discharge.

## 2021-01-23 NOTE — PROGRESS NOTES
Pulmonary  Progress Note      NAME: Johann Bobby   :  1955  MRM:  [id number]    Date/Time: 2021  9:30 AM         Subjective:   I was asked by Dina Mcpherson MD to see Johann Bobby  a 77 y.o.    male in consultation      Excerpts from admission 1/15/2021 or consult notes as follows:   49-year-old male came in because of shortness of breath and dyspnea nonproductive cough patient was tested positive for COVID-19 several days ago then he started having worsening of his symptoms of shortness of breath dyspnea cough chest x-ray shows bilateral infiltrate consistent with COVID-19 he was hypoxic he was put on oxygen via nasal cannula 4 L so now pulmonary consult was called for further evaluation.        No Known Allergies      MAR reviewed and pertinent medications noted or modified as needed          Current Facility-Administered Medications   Medication    zinc sulfate (ZINCATE) 220 (50) mg capsule 1 Cap    ascorbic acid (vitamin C) (VITAMIN C) tablet 500 mg    cholecalciferol (VITAMIN D3) tablet 5,000 Units    dexamethasone (DECADRON) 4 mg/mL injection 4 mg    allopurinoL (ZYLOPRIM) tablet 100 mg    amLODIPine (NORVASC) tablet 10 mg    [Held by provider] atenoloL (TENORMIN) tablet 50 mg    calcitRIOL (ROCALTROL) capsule 0.25 mcg    famotidine (PEPCID) tablet 20 mg    hydrALAZINE (APRESOLINE) tablet 100 mg    levothyroxine (SYNTHROID) tablet 50 mcg    sodium chloride (NS) flush 5-40 mL    sodium chloride (NS) flush 5-40 mL    acetaminophen (TYLENOL) tablet 650 mg     Or    acetaminophen (TYLENOL) suppository 650 mg    polyethylene glycol (MIRALAX) packet 17 g    promethazine (PHENERGAN) tablet 12.5 mg     Or    ondansetron (ZOFRAN) injection 4 mg    heparin (porcine) injection 5,000 Units    azithromycin (ZITHROMAX) 500 mg in 0.9% sodium chloride 250 mL (VIAL-MATE)    cefTRIAXone (ROCEPHIN) 1 g in sterile water (preservative free) 10 mL IV syringe    glucose chewable tablet 16 g    dextrose (D50W) injection syrg 12.5-25 g    glucagon (GLUCAGEN) injection 1 mg    insulin lispro (HUMALOG) injection      Patient PCP: Manpreet Posada DO  PMH:  has a past medical history of CKD (chronic kidney disease) stage 4, GFR 15-29 ml/min ([facility] [address] DM (diabetes mellitus) ([facility] [address] H/O cervical spine surgery, and HTN (hypertension). PSH:   has a past surgical history that includes hx orthopaedic. FHX: family history includes Coronary Artery Disease in his mother; Heart Failure in his mother; Pacemaker in his sister. SHX:  reports that he has never smoked. He has never used smokeless tobacco. He reports previous alcohol use. He reports previous drug use. ROS:     Review of Systems   Constitutional: Positive for malaise/fatigue. HENT: Negative. Eyes: Negative. Respiratory: Positive for cough and shortness of breath. Cardiovascular: Positive for orthopnea. Gastrointestinal: Negative. Genitourinary: Negative. Musculoskeletal: Negative. Neurological: Negative. Endo/Heme/Allergies: Negative. Psychiatric/Behavioral: Negative. Objective:       Vitals:      Last 24hrs VS reviewed since prior progress note. Most recent are:    Visit Vitals  BP (!) 144/84 (BP 1 Location: Left arm, BP Patient Position: At rest;Sitting)   Pulse (!) 107   Temp (!) 96.2 °F (35.7 °C)   Resp 20   Ht 5' 7\" (1.702 m)   Wt 114.3 kg (252 lb)   SpO2 95%   BMI 39.47 kg/m²     SpO2 Readings from Last 6 Encounters:   01/23/21 95%    O2 Flow Rate (L/min): 1 l/min       Intake/Output Summary (Last 24 hours) at 1/23/2021 1000  Last data filed at 1/23/2021 6717  Gross per 24 hour   Intake 360 ml   Output 675 ml   Net -315 ml          Exam:   Physical Exam   Constitutional: He appears distressed. HENT:   Head: Normocephalic and atraumatic. Eyes: Pupils are equal, round, and reactive to light. Conjunctivae are normal.   Neck: Normal range of motion. Neck supple.    Cardiovascular: Normal rate and regular rhythm. Pulmonary/Chest: He is in respiratory distress. Abdominal: Soft. Bowel sounds are normal.   Musculoskeletal:         General: Edema present. Neurological: He is alert.              Lab Data Reviewed: (see below)      Medications:  Current Facility-Administered Medications   Medication Dose Route Frequency    azithromycin (ZITHROMAX) tablet 500 mg  500 mg Oral DAILY    amoxicillin-clavulanate (AUGMENTIN) 500-125 mg per tablet 1 Tab  1 Tab Oral Q12H    predniSONE (DELTASONE) tablet 20 mg  20 mg Oral DAILY WITH BREAKFAST    zinc sulfate (ZINCATE) 220 (50) mg capsule 1 Cap  1 Cap Oral DAILY    ascorbic acid (vitamin C) (VITAMIN C) tablet 500 mg  500 mg Oral DAILY    cholecalciferol (VITAMIN D3) tablet 5,000 Units  5,000 Units Oral DAILY    allopurinoL (ZYLOPRIM) tablet 100 mg  100 mg Oral DAILY    amLODIPine (NORVASC) tablet 10 mg  10 mg Oral DAILY    [Held by provider] atenoloL (TENORMIN) tablet 50 mg  50 mg Oral DAILY    calcitRIOL (ROCALTROL) capsule 0.25 mcg  0.25 mcg Oral DAILY    famotidine (PEPCID) tablet 20 mg  20 mg Oral BID    hydrALAZINE (APRESOLINE) tablet 100 mg  100 mg Oral TID    levothyroxine (SYNTHROID) tablet 50 mcg  50 mcg Oral ACB    acetaminophen (TYLENOL) tablet 650 mg  650 mg Oral Q6H PRN    Or    acetaminophen (TYLENOL) suppository 650 mg  650 mg Rectal Q6H PRN    polyethylene glycol (MIRALAX) packet 17 g  17 g Oral DAILY PRN    promethazine (PHENERGAN) tablet 12.5 mg  12.5 mg Oral Q6H PRN    heparin (porcine) injection 5,000 Units  5,000 Units SubCUTAneous Q8H    glucose chewable tablet 16 g  4 Tab Oral PRN    dextrose (D50W) injection syrg 12.5-25 g  25-50 mL IntraVENous PRN    glucagon (GLUCAGEN) injection 1 mg  1 mg IntraMUSCular PRN    insulin lispro (HUMALOG) injection   SubCUTAneous AC&HS       ______________________________________________________________________      Lab Review:     Recent Labs     01/22/21  1406   WBC 16.4* HGB 10.5*   HCT 31.0*   *     Recent Labs     01/22/21  1406      K 5.1      CO2 19*   *   *   CREA 4.10*   CA 9.0     No components found for: GLPOC  No results for input(s): PH, PCO2, PO2, HCO3, FIO2 in the last 72 hours. No results for input(s): INR, INREXT, INREXT, INREXT in the last 72 hours. IMPRESSION:   1. Acute hypoxic respiratory failure on 1 LNC  2. COVID-19 pneumonia  3. History of hypertension diabetes mellitus  5. Body mass index is 39.47 kg/m². 4. Acute on chronic kidney disease creatinine is 5 his GFR is 14 not a candidate for remdesivir   5. He recieved ivermectin 12 mg 1 dose and another dose 1/19  6. Bradycardia resolved off beta-blocker  7. Pt is requiring Drug therapy requiring intensive monitoring for toxicity  8. Pt is unstable, unpredictable needing inpatient monitoring; is acutely ill and at high risk of sudden decline and decompensation with severe consequenses and continued end organ dysfunction and failure  9. Prognosis guarded        RECOMMENDATIONS/PLAN:     1. Patient is on 1 L nasal Cannula oxygen as salvage oxygen delivery device to provide high concentration of oxygen to overcome refractory hypoxia;  2. Continue with Decadron Zithromax and Rocephin. Patient received 2 dose of 12 mg ivermectin and second dose of ivermectin 12 mg on 1/19  3. On zinc, vit C, vit D  4. His oxygen level still low he needs to be on oxygen when discharged home 2 L nasal cannula  5. Renal function improving slightly, will continue to monitor  6. Follow culture results. Thus far no growth  7. Supplemental O2 to keep sats > 93%  8. Aspiration precautions  9. Continue labs to follow electrolytes, renal function and and blood counts  10. Glucose monitoring and SSI  11. Bronchial hygiene with respiratory therapy techniques, bronchodilators  12.  DVT, SUP prophylaxis

## 2021-01-24 LAB
ANION GAP SERPL CALC-SCNC: 7 MMOL/L (ref 5–15)
BASOPHILS # BLD: 0 K/UL (ref 0–0.1)
BASOPHILS NFR BLD: 0 % (ref 0–1)
BUN SERPL-MCNC: 116 MG/DL (ref 6–20)
BUN/CREAT SERPL: 29 (ref 12–20)
CA-I BLD-MCNC: 8.5 MG/DL (ref 8.5–10.1)
CHLORIDE SERPL-SCNC: 110 MMOL/L (ref 97–108)
CO2 SERPL-SCNC: 21 MMOL/L (ref 21–32)
CREAT SERPL-MCNC: 4.01 MG/DL (ref 0.7–1.3)
DIFFERENTIAL METHOD BLD: ABNORMAL
EOSINOPHIL # BLD: 0 K/UL (ref 0–0.4)
EOSINOPHIL NFR BLD: 0 % (ref 0–7)
ERYTHROCYTE [DISTWIDTH] IN BLOOD BY AUTOMATED COUNT: 17 % (ref 11.5–14.5)
GLUCOSE BLD STRIP.AUTO-MCNC: 119 MG/DL (ref 65–100)
GLUCOSE BLD STRIP.AUTO-MCNC: 119 MG/DL (ref 65–100)
GLUCOSE BLD STRIP.AUTO-MCNC: 138 MG/DL (ref 65–100)
GLUCOSE BLD STRIP.AUTO-MCNC: 94 MG/DL (ref 65–100)
GLUCOSE SERPL-MCNC: 119 MG/DL (ref 65–100)
HCT VFR BLD AUTO: 25.7 % (ref 36.6–50.3)
HGB BLD-MCNC: 8.5 G/DL (ref 12.1–17)
IMM GRANULOCYTES # BLD AUTO: 0.4 K/UL (ref 0–0.04)
IMM GRANULOCYTES NFR BLD AUTO: 3 % (ref 0–0.5)
LYMPHOCYTES # BLD: 1.3 K/UL (ref 0.8–3.5)
LYMPHOCYTES NFR BLD: 10 % (ref 12–49)
MCH RBC QN AUTO: 26.2 PG (ref 26–34)
MCHC RBC AUTO-ENTMCNC: 33.1 G/DL (ref 30–36.5)
MCV RBC AUTO: 79.3 FL (ref 80–99)
MONOCYTES # BLD: 0.2 K/UL (ref 0–1)
MONOCYTES NFR BLD: 1 % (ref 5–13)
NEUTS SEG # BLD: 11.1 K/UL (ref 1.8–8)
NEUTS SEG NFR BLD: 86 % (ref 32–75)
PERFORMED BY, TECHID: ABNORMAL
PERFORMED BY, TECHID: NORMAL
PLATELET # BLD AUTO: 318 K/UL (ref 150–400)
PMV BLD AUTO: 9.9 FL (ref 8.9–12.9)
POTASSIUM SERPL-SCNC: 5.3 MMOL/L (ref 3.5–5.1)
PROCALCITONIN SERPL-MCNC: <0.05 NG/ML
RBC # BLD AUTO: 3.24 M/UL (ref 4.1–5.7)
SODIUM SERPL-SCNC: 138 MMOL/L (ref 136–145)
WBC # BLD AUTO: 12.6 K/UL (ref 4.1–11.1)

## 2021-01-24 PROCEDURE — 74011250637 HC RX REV CODE- 250/637: Performed by: PHYSICIAN ASSISTANT

## 2021-01-24 PROCEDURE — 74011636637 HC RX REV CODE- 636/637: Performed by: PHYSICIAN ASSISTANT

## 2021-01-24 PROCEDURE — 74011250636 HC RX REV CODE- 250/636: Performed by: FAMILY MEDICINE

## 2021-01-24 PROCEDURE — 65270000029 HC RM PRIVATE

## 2021-01-24 PROCEDURE — 84145 PROCALCITONIN (PCT): CPT

## 2021-01-24 PROCEDURE — 36415 COLL VENOUS BLD VENIPUNCTURE: CPT

## 2021-01-24 PROCEDURE — 85025 COMPLETE CBC W/AUTO DIFF WBC: CPT

## 2021-01-24 PROCEDURE — 82962 GLUCOSE BLOOD TEST: CPT

## 2021-01-24 PROCEDURE — 80048 BASIC METABOLIC PNL TOTAL CA: CPT

## 2021-01-24 RX ORDER — ATENOLOL 50 MG/1
50 TABLET ORAL DAILY
Status: DISCONTINUED | OUTPATIENT
Start: 2021-01-24 | End: 2021-01-26 | Stop reason: HOSPADM

## 2021-01-24 RX ADMIN — OXYCODONE HYDROCHLORIDE AND ACETAMINOPHEN 500 MG: 500 TABLET ORAL at 10:05

## 2021-01-24 RX ADMIN — ATENOLOL 50 MG: 50 TABLET ORAL at 13:33

## 2021-01-24 RX ADMIN — FAMOTIDINE 20 MG: 20 TABLET, FILM COATED ORAL at 10:05

## 2021-01-24 RX ADMIN — CALCITRIOL CAPSULES 0.25 MCG 0.25 MCG: 0.25 CAPSULE ORAL at 10:05

## 2021-01-24 RX ADMIN — HYDRALAZINE HYDROCHLORIDE 100 MG: 50 TABLET, FILM COATED ORAL at 22:28

## 2021-01-24 RX ADMIN — PREDNISONE 20 MG: 20 TABLET ORAL at 08:00

## 2021-01-24 RX ADMIN — AMOXICILLIN AND CLAVULANATE POTASSIUM 1 TABLET: 500; 125 TABLET, FILM COATED ORAL at 22:28

## 2021-01-24 RX ADMIN — AZITHROMYCIN MONOHYDRATE 500 MG: 500 TABLET ORAL at 10:06

## 2021-01-24 RX ADMIN — AMLODIPINE BESYLATE 10 MG: 5 TABLET ORAL at 10:06

## 2021-01-24 RX ADMIN — AMOXICILLIN AND CLAVULANATE POTASSIUM 1 TABLET: 500; 125 TABLET, FILM COATED ORAL at 10:06

## 2021-01-24 RX ADMIN — CHOLECALCIFEROL TAB 125 MCG (5000 UNIT) 5000 UNITS: 125 TAB at 10:06

## 2021-01-24 RX ADMIN — HEPARIN SODIUM 5000 UNITS: 5000 INJECTION INTRAVENOUS; SUBCUTANEOUS at 05:50

## 2021-01-24 RX ADMIN — HEPARIN SODIUM 5000 UNITS: 5000 INJECTION INTRAVENOUS; SUBCUTANEOUS at 13:33

## 2021-01-24 RX ADMIN — HEPARIN SODIUM 5000 UNITS: 5000 INJECTION INTRAVENOUS; SUBCUTANEOUS at 22:27

## 2021-01-24 RX ADMIN — HYDRALAZINE HYDROCHLORIDE 100 MG: 50 TABLET, FILM COATED ORAL at 10:06

## 2021-01-24 RX ADMIN — FAMOTIDINE 20 MG: 20 TABLET, FILM COATED ORAL at 22:28

## 2021-01-24 RX ADMIN — ALLOPURINOL 100 MG: 100 TABLET ORAL at 10:06

## 2021-01-24 RX ADMIN — HYDRALAZINE HYDROCHLORIDE 100 MG: 50 TABLET, FILM COATED ORAL at 15:57

## 2021-01-24 RX ADMIN — Medication 1 CAPSULE: at 10:04

## 2021-01-24 RX ADMIN — LEVOTHYROXINE SODIUM 50 MCG: 0.03 TABLET ORAL at 05:50

## 2021-01-24 NOTE — PROGRESS NOTES
Hospitalist Progress Note    Subjective:   Daily Progress Note: 1/24/2021 8:26 AM    Hospital Course: Patient is a 59-year-old black male with a history of type 2 diabetes, hypertension, CKD stage IV presented to the ER on 1/15/2021 for generalized weakness, nonproductive cough, shortness of breath. Patient reports that he tested positive for Covid several days ago. In the last 24 hours he had decreased oral intake, loss of appetite, worsening shortness of breath, subjective fever and chills. In the ED patient's oxygen saturation was in the 80s while ambulating and therefore was placed on supplemental oxygen. Chest x-ray showed signs concerning for bilateral pneumonia suspicious for COVID-19. He was given 1 L of hydration with 1 dose of Decadron. D-dimer elevated 2.69. , lactic acid 1.2, procalcitonin 0.28, C-reactive protein 13.50. Patient continues on supplemental oxygen. Patient is on IV Decadron, azithromycin, Rocephin. on zinc, vitamin C, vitamin D. Pulmonary Consulted. Oxygen demands are improving. Will try exercise test monitoring oxygen saturation pending discharge. Still with hypothermia and oxygen requirement with activity. Persistent tachycardia, VQ scan    Subjective: Patient denies any SOB except with excercise, chest pain. Does have a cough, nonproductive.      Current Facility-Administered Medications   Medication Dose Route Frequency    azithromycin (ZITHROMAX) tablet 500 mg  500 mg Oral DAILY    amoxicillin-clavulanate (AUGMENTIN) 500-125 mg per tablet 1 Tab  1 Tab Oral Q12H    predniSONE (DELTASONE) tablet 20 mg  20 mg Oral DAILY WITH BREAKFAST    zinc sulfate (ZINCATE) 220 (50) mg capsule 1 Cap  1 Cap Oral DAILY    ascorbic acid (vitamin C) (VITAMIN C) tablet 500 mg  500 mg Oral DAILY    cholecalciferol (VITAMIN D3) tablet 5,000 Units  5,000 Units Oral DAILY    allopurinoL (ZYLOPRIM) tablet 100 mg  100 mg Oral DAILY    amLODIPine (NORVASC) tablet 10 mg  10 mg Oral DAILY  [Held by provider] atenoloL (TENORMIN) tablet 50 mg  50 mg Oral DAILY    calcitRIOL (ROCALTROL) capsule 0.25 mcg  0.25 mcg Oral DAILY    famotidine (PEPCID) tablet 20 mg  20 mg Oral BID    hydrALAZINE (APRESOLINE) tablet 100 mg  100 mg Oral TID    levothyroxine (SYNTHROID) tablet 50 mcg  50 mcg Oral ACB    acetaminophen (TYLENOL) tablet 650 mg  650 mg Oral Q6H PRN    Or    acetaminophen (TYLENOL) suppository 650 mg  650 mg Rectal Q6H PRN    polyethylene glycol (MIRALAX) packet 17 g  17 g Oral DAILY PRN    promethazine (PHENERGAN) tablet 12.5 mg  12.5 mg Oral Q6H PRN    heparin (porcine) injection 5,000 Units  5,000 Units SubCUTAneous Q8H    glucose chewable tablet 16 g  4 Tab Oral PRN    dextrose (D50W) injection syrg 12.5-25 g  25-50 mL IntraVENous PRN    glucagon (GLUCAGEN) injection 1 mg  1 mg IntraMUSCular PRN    insulin lispro (HUMALOG) injection   SubCUTAneous AC&HS        Review of Systems  Constitutional: No fevers, No chills, No sweats, No fatigue, +Weakness  Eyes: No redness  Ears, nose, mouth, throat, and face: No nasal congestion, No sore throat, No voice change  Respiratory: No Shortness of Breath, ++ cough, No wheezing  Cardiovascular: No chest pain, No palpitations, No extremity edema  Gastrointestinal: No nausea, No vomiting, No diarrhea, No abdominal pain  Genitourinary: No frequency, No dysuria, No hematuria  Integument/breast: No skin lesion(s)   Neurological: No Confusion, No headaches, No dizziness      Objective:     Visit Vitals  BP (!) 153/84   Pulse (!) 110   Temp 97.4 °F (36.3 °C)   Resp 18   Ht 5' 7\" (1.702 m)   Wt 114.3 kg (252 lb)   SpO2 97%   BMI 39.47 kg/m²    O2 Flow Rate (L/min): 1 l/min O2 Device: Room air    Temp (24hrs), Av.7 °F (36.5 °C), Min:96.7 °F (35.9 °C), Max:100 °F (37.8 °C)       07 1900  In: 300 [P.O.:300]  Out: 750 [Urine:750]   0700  In: 960 [P.O.:960]  Out: 925 [Urine:925]    PHYSICAL EXAM:  Constitutional: No acute distress  Skin: Extremities and face reveal no rashes. HEENT: Sclerae anicteric. Extra-occular muscles are intact. No oral ulcers  Cardiovascular: RRR   Respiratory:  Nonlabored, diminished  GI: Abdomen nondistended, soft, and nontender. Normal active bowel sounds. Musculoskeletal: No pitting edema of the lower legs. Able to move all ext  Neurological:  Patient is alert and oriented. Cranial nerves II-XII grossly intact  Psychiatric: Mood appears appropriate       Data Review    Recent Results (from the past 24 hour(s))   GLUCOSE, POC    Collection Time: 01/23/21 12:59 PM   Result Value Ref Range    Glucose (POC) 115 (H) 65 - 100 mg/dL    Performed by [address] [place] [address], POC    Collection Time: 01/23/21  4:54 PM   Result Value Ref Range    Glucose (POC) 150 (H) 65 - 100 mg/dL    Performed by [address] [place] [address], POC    Collection Time: 01/23/21  8:11 PM   Result Value Ref Range    Glucose (POC) 147 (H) 65 - 100 mg/dL    Performed by Ven[address], POC    Collection Time: 01/24/21  8:17 AM   Result Value Ref Range    Glucose (POC) 119 (H) 65 - 100 mg/dL    Performed by Zack London        Radiology review: Chest xray      Assessment/Plan:    1. Bilateral pneumonia concerning for COVID-19  Oxygen saturation within normal limits. Augmentin and azithromycin  Consult pulmonology. Per pulmonology due to renal function patient is not a candidate for remdesivir  Ivermectin per pulmonary  Prednisone  Pulse oximetry exercise test Exercise was 83% on RA, will need home O2    2. Type 2 diabetes  Blood glucose level stable. Continue with insulin sliding scale. 3.  Hypertension  BP stable. Restarted Atenolol due due tachycardia  On norvasc and hydralzine. 4. CKD stage IV  We will continue to monitor. BUN and Cr stable    5. Hypothyroidism  On synthroid. TSH within normal limits    6. CBC and BMP in the AM   7. PT/OT     8.  Hypothermia  Work up pending:  Blood culutres  CXR normal  TSH normal  Suspect related to Covid    9. Tachycardia  Restarted home atenolol  VQ scan    CODE STATUS Full      DVT prophylaxis: Heparin  Ulcer prophylaxis: Protonix    Care Plan discussed with: Patient/Family and Nurse     [phone], expressing concerns for [facility] and/or rehab    Discussed case with nanci. Plan for discharge pending SNF versus home health  Total time spent with patient: 29 minutes.

## 2021-01-25 ENCOUNTER — APPOINTMENT (OUTPATIENT)
Dept: NUCLEAR MEDICINE | Age: 66
DRG: 177 | End: 2021-01-25
Attending: PHYSICIAN ASSISTANT
Payer: MEDICARE

## 2021-01-25 LAB
ALBUMIN SERPL-MCNC: 2.7 G/DL (ref 3.5–5)
ALBUMIN/GLOB SERPL: 0.7 {RATIO} (ref 1.1–2.2)
ALP SERPL-CCNC: 63 U/L (ref 45–117)
ALT SERPL-CCNC: 21 U/L (ref 12–78)
ANION GAP SERPL CALC-SCNC: 10 MMOL/L (ref 5–15)
AST SERPL W P-5'-P-CCNC: 17 U/L (ref 15–37)
BASOPHILS # BLD: 0 K/UL (ref 0–0.1)
BASOPHILS NFR BLD: 0 % (ref 0–1)
BILIRUB SERPL-MCNC: 0.2 MG/DL (ref 0.2–1)
BUN SERPL-MCNC: 108 MG/DL (ref 6–20)
BUN/CREAT SERPL: 29 (ref 12–20)
CA-I BLD-MCNC: 9.2 MG/DL (ref 8.5–10.1)
CHLORIDE SERPL-SCNC: 109 MMOL/L (ref 97–108)
CO2 SERPL-SCNC: 19 MMOL/L (ref 21–32)
CREAT SERPL-MCNC: 3.77 MG/DL (ref 0.7–1.3)
DIFFERENTIAL METHOD BLD: ABNORMAL
EOSINOPHIL # BLD: 0.1 K/UL (ref 0–0.4)
EOSINOPHIL NFR BLD: 1 % (ref 0–7)
ERYTHROCYTE [DISTWIDTH] IN BLOOD BY AUTOMATED COUNT: 17.1 % (ref 11.5–14.5)
GLOBULIN SER CALC-MCNC: 4.1 G/DL (ref 2–4)
GLUCOSE BLD STRIP.AUTO-MCNC: 102 MG/DL (ref 65–100)
GLUCOSE BLD STRIP.AUTO-MCNC: 123 MG/DL (ref 65–100)
GLUCOSE BLD STRIP.AUTO-MCNC: 200 MG/DL (ref 65–100)
GLUCOSE BLD STRIP.AUTO-MCNC: 94 MG/DL (ref 65–100)
GLUCOSE SERPL-MCNC: 94 MG/DL (ref 65–100)
HCT VFR BLD AUTO: 29.4 % (ref 36.6–50.3)
HGB BLD-MCNC: 9.7 G/DL (ref 12.1–17)
IMM GRANULOCYTES # BLD AUTO: 0.2 K/UL (ref 0–0.04)
IMM GRANULOCYTES NFR BLD AUTO: 2 % (ref 0–0.5)
LYMPHOCYTES # BLD: 0.8 K/UL (ref 0.8–3.5)
LYMPHOCYTES NFR BLD: 8 % (ref 12–49)
MCH RBC QN AUTO: 26.1 PG (ref 26–34)
MCHC RBC AUTO-ENTMCNC: 33 G/DL (ref 30–36.5)
MCV RBC AUTO: 79.2 FL (ref 80–99)
MONOCYTES # BLD: 0.1 K/UL (ref 0–1)
MONOCYTES NFR BLD: 1 % (ref 5–13)
NEUTS SEG # BLD: 9 K/UL (ref 1.8–8)
NEUTS SEG NFR BLD: 88 % (ref 32–75)
PERFORMED BY, TECHID: ABNORMAL
PERFORMED BY, TECHID: NORMAL
PLATELET # BLD AUTO: 272 K/UL (ref 150–400)
PMV BLD AUTO: 10 FL (ref 8.9–12.9)
POTASSIUM SERPL-SCNC: 6.1 MMOL/L (ref 3.5–5.1)
PROT SERPL-MCNC: 6.8 G/DL (ref 6.4–8.2)
RBC # BLD AUTO: 3.71 M/UL (ref 4.1–5.7)
SODIUM SERPL-SCNC: 138 MMOL/L (ref 136–145)
WBC # BLD AUTO: 10 K/UL (ref 4.1–11.1)

## 2021-01-25 PROCEDURE — 82962 GLUCOSE BLOOD TEST: CPT

## 2021-01-25 PROCEDURE — 74011250636 HC RX REV CODE- 250/636: Performed by: PHYSICIAN ASSISTANT

## 2021-01-25 PROCEDURE — 94760 N-INVAS EAR/PLS OXIMETRY 1: CPT

## 2021-01-25 PROCEDURE — 74011250637 HC RX REV CODE- 250/637: Performed by: PHYSICIAN ASSISTANT

## 2021-01-25 PROCEDURE — 74011000250 HC RX REV CODE- 250: Performed by: PHYSICIAN ASSISTANT

## 2021-01-25 PROCEDURE — 36415 COLL VENOUS BLD VENIPUNCTURE: CPT

## 2021-01-25 PROCEDURE — A9540 TC99M MAA: HCPCS

## 2021-01-25 PROCEDURE — 85025 COMPLETE CBC W/AUTO DIFF WBC: CPT

## 2021-01-25 PROCEDURE — 74011636637 HC RX REV CODE- 636/637: Performed by: FAMILY MEDICINE

## 2021-01-25 PROCEDURE — 74011250636 HC RX REV CODE- 250/636: Performed by: FAMILY MEDICINE

## 2021-01-25 PROCEDURE — 80053 COMPREHEN METABOLIC PANEL: CPT

## 2021-01-25 PROCEDURE — 74011636637 HC RX REV CODE- 636/637: Performed by: PHYSICIAN ASSISTANT

## 2021-01-25 PROCEDURE — 65270000029 HC RM PRIVATE

## 2021-01-25 RX ORDER — SODIUM BICARBONATE 1 MEQ/ML
50 SYRINGE (ML) INTRAVENOUS ONCE
Status: COMPLETED | OUTPATIENT
Start: 2021-01-25 | End: 2021-01-25

## 2021-01-25 RX ORDER — FUROSEMIDE 10 MG/ML
80 INJECTION INTRAMUSCULAR; INTRAVENOUS ONCE
Status: COMPLETED | OUTPATIENT
Start: 2021-01-25 | End: 2021-01-25

## 2021-01-25 RX ORDER — SODIUM POLYSTYRENE SULFONATE 15 G/60ML
15 SUSPENSION ORAL; RECTAL
Status: COMPLETED | OUTPATIENT
Start: 2021-01-25 | End: 2021-01-25

## 2021-01-25 RX ADMIN — ALLOPURINOL 100 MG: 100 TABLET ORAL at 10:23

## 2021-01-25 RX ADMIN — CHOLECALCIFEROL TAB 125 MCG (5000 UNIT) 5000 UNITS: 125 TAB at 10:23

## 2021-01-25 RX ADMIN — AZITHROMYCIN MONOHYDRATE 500 MG: 500 TABLET ORAL at 10:23

## 2021-01-25 RX ADMIN — SODIUM BICARBONATE 50 MEQ: 84 INJECTION, SOLUTION INTRAVENOUS at 17:26

## 2021-01-25 RX ADMIN — ATENOLOL 50 MG: 50 TABLET ORAL at 10:24

## 2021-01-25 RX ADMIN — FAMOTIDINE 20 MG: 20 TABLET, FILM COATED ORAL at 10:23

## 2021-01-25 RX ADMIN — SODIUM CHLORIDE 500 ML: 9 INJECTION, SOLUTION INTRAVENOUS at 17:26

## 2021-01-25 RX ADMIN — FAMOTIDINE 20 MG: 20 TABLET, FILM COATED ORAL at 21:45

## 2021-01-25 RX ADMIN — SODIUM POLYSTYRENE SULFONATE 15 G: 15 SUSPENSION ORAL; RECTAL at 21:45

## 2021-01-25 RX ADMIN — HYDRALAZINE HYDROCHLORIDE 100 MG: 50 TABLET, FILM COATED ORAL at 21:45

## 2021-01-25 RX ADMIN — HYDRALAZINE HYDROCHLORIDE 100 MG: 50 TABLET, FILM COATED ORAL at 10:23

## 2021-01-25 RX ADMIN — AMOXICILLIN AND CLAVULANATE POTASSIUM 1 TABLET: 500; 125 TABLET, FILM COATED ORAL at 21:45

## 2021-01-25 RX ADMIN — INSULIN LISPRO 2 UNITS: 100 INJECTION, SOLUTION INTRAVENOUS; SUBCUTANEOUS at 17:26

## 2021-01-25 RX ADMIN — HEPARIN SODIUM 5000 UNITS: 5000 INJECTION INTRAVENOUS; SUBCUTANEOUS at 05:24

## 2021-01-25 RX ADMIN — HEPARIN SODIUM 5000 UNITS: 5000 INJECTION INTRAVENOUS; SUBCUTANEOUS at 16:05

## 2021-01-25 RX ADMIN — AMOXICILLIN AND CLAVULANATE POTASSIUM 1 TABLET: 500; 125 TABLET, FILM COATED ORAL at 10:23

## 2021-01-25 RX ADMIN — CALCITRIOL CAPSULES 0.25 MCG 0.25 MCG: 0.25 CAPSULE ORAL at 10:23

## 2021-01-25 RX ADMIN — LEVOTHYROXINE SODIUM 50 MCG: 0.03 TABLET ORAL at 05:24

## 2021-01-25 RX ADMIN — AMLODIPINE BESYLATE 10 MG: 5 TABLET ORAL at 10:23

## 2021-01-25 RX ADMIN — FUROSEMIDE 80 MG: 10 INJECTION, SOLUTION INTRAMUSCULAR; INTRAVENOUS at 17:25

## 2021-01-25 RX ADMIN — HYDRALAZINE HYDROCHLORIDE 100 MG: 50 TABLET, FILM COATED ORAL at 17:26

## 2021-01-25 RX ADMIN — PREDNISONE 20 MG: 20 TABLET ORAL at 10:28

## 2021-01-25 NOTE — PROGRESS NOTES
Pulmonary  Progress Note      NAME: Wong Chilel   :  1955  MRM:  [id number]    Date/Time: 2021  9:30 AM         Subjective:   I was asked by Roni Spencer MD to see Wong Chilel  a 77 y.o.    male in consultation      Excerpts from admission 1/15/2021 or consult notes as follows:   17-year-old male came in because of shortness of breath and dyspnea nonproductive cough patient was tested positive for COVID-19 several days ago then he started having worsening of his symptoms of shortness of breath dyspnea cough chest x-ray shows bilateral infiltrate consistent with COVID-19 he was hypoxic he was put on oxygen via nasal cannula 4 L so now pulmonary consult was called for further evaluation.        No Known Allergies      MAR reviewed and pertinent medications noted or modified as needed          Current Facility-Administered Medications   Medication    zinc sulfate (ZINCATE) 220 (50) mg capsule 1 Cap    ascorbic acid (vitamin C) (VITAMIN C) tablet 500 mg    cholecalciferol (VITAMIN D3) tablet 5,000 Units    dexamethasone (DECADRON) 4 mg/mL injection 4 mg    allopurinoL (ZYLOPRIM) tablet 100 mg    amLODIPine (NORVASC) tablet 10 mg    [Held by provider] atenoloL (TENORMIN) tablet 50 mg    calcitRIOL (ROCALTROL) capsule 0.25 mcg    famotidine (PEPCID) tablet 20 mg    hydrALAZINE (APRESOLINE) tablet 100 mg    levothyroxine (SYNTHROID) tablet 50 mcg    sodium chloride (NS) flush 5-40 mL    sodium chloride (NS) flush 5-40 mL    acetaminophen (TYLENOL) tablet 650 mg     Or    acetaminophen (TYLENOL) suppository 650 mg    polyethylene glycol (MIRALAX) packet 17 g    promethazine (PHENERGAN) tablet 12.5 mg     Or    ondansetron (ZOFRAN) injection 4 mg    heparin (porcine) injection 5,000 Units    azithromycin (ZITHROMAX) 500 mg in 0.9% sodium chloride 250 mL (VIAL-MATE)    cefTRIAXone (ROCEPHIN) 1 g in sterile water (preservative free) 10 mL IV syringe    glucose chewable tablet 16 g    dextrose (D50W) injection syrg 12.5-25 g    glucagon (GLUCAGEN) injection 1 mg    insulin lispro (HUMALOG) injection      Patient PCP: Judah Martinez DO  PMH:  has a past medical history of CKD (chronic kidney disease) stage 4, GFR 15-29 ml/min ([facility] [address] DM (diabetes mellitus) ([facility] [address] H/O cervical spine surgery, and HTN (hypertension). PSH:   has a past surgical history that includes hx orthopaedic. FHX: family history includes Coronary Artery Disease in his mother; Heart Failure in his mother; Pacemaker in his sister. SHX:  reports that he has never smoked. He has never used smokeless tobacco. He reports previous alcohol use. He reports previous drug use. ROS:     Review of Systems   Constitutional: Positive for malaise/fatigue. HENT: Negative. Eyes: Negative. Respiratory: Positive for cough and shortness of breath. Cardiovascular: Positive for orthopnea. Gastrointestinal: Negative. Genitourinary: Negative. Musculoskeletal: Negative. Neurological: Negative. Endo/Heme/Allergies: Negative. Psychiatric/Behavioral: Negative. Objective:       Vitals:      Last 24hrs VS reviewed since prior progress note. Most recent are:    Visit Vitals  BP (!) 150/79 (BP 1 Location: Left arm, BP Patient Position: At rest)   Pulse 66   Temp 98.3 °F (36.8 °C)   Resp 18   Ht 5' 7\" (1.702 m)   Wt 114.3 kg (252 lb)   SpO2 96%   BMI 39.47 kg/m²     SpO2 Readings from Last 6 Encounters:   01/25/21 96%    O2 Flow Rate (L/min): 1 l/min       Intake/Output Summary (Last 24 hours) at 1/25/2021 0915  Last data filed at 1/25/2021 0739  Gross per 24 hour   Intake 300 ml   Output 725 ml   Net -425 ml          Exam:   Physical Exam   Constitutional: He appears distressed. HENT:   Head: Normocephalic and atraumatic. Eyes: Pupils are equal, round, and reactive to light. Conjunctivae are normal.   Neck: Normal range of motion. Neck supple.    Cardiovascular: Normal rate and regular rhythm. Pulmonary/Chest: He is in respiratory distress. Abdominal: Soft. Bowel sounds are normal.   Musculoskeletal:         General: Edema present. Neurological: He is alert.              Lab Data Reviewed: (see below)      Medications:  Current Facility-Administered Medications   Medication Dose Route Frequency    atenoloL (TENORMIN) tablet 50 mg  50 mg Oral DAILY    azithromycin (ZITHROMAX) tablet 500 mg  500 mg Oral DAILY    amoxicillin-clavulanate (AUGMENTIN) 500-125 mg per tablet 1 Tab  1 Tab Oral Q12H    predniSONE (DELTASONE) tablet 20 mg  20 mg Oral DAILY WITH BREAKFAST    cholecalciferol (VITAMIN D3) tablet 5,000 Units  5,000 Units Oral DAILY    allopurinoL (ZYLOPRIM) tablet 100 mg  100 mg Oral DAILY    amLODIPine (NORVASC) tablet 10 mg  10 mg Oral DAILY    calcitRIOL (ROCALTROL) capsule 0.25 mcg  0.25 mcg Oral DAILY    famotidine (PEPCID) tablet 20 mg  20 mg Oral BID    hydrALAZINE (APRESOLINE) tablet 100 mg  100 mg Oral TID    levothyroxine (SYNTHROID) tablet 50 mcg  50 mcg Oral ACB    acetaminophen (TYLENOL) tablet 650 mg  650 mg Oral Q6H PRN    Or    acetaminophen (TYLENOL) suppository 650 mg  650 mg Rectal Q6H PRN    polyethylene glycol (MIRALAX) packet 17 g  17 g Oral DAILY PRN    promethazine (PHENERGAN) tablet 12.5 mg  12.5 mg Oral Q6H PRN    heparin (porcine) injection 5,000 Units  5,000 Units SubCUTAneous Q8H    glucose chewable tablet 16 g  4 Tab Oral PRN    dextrose (D50W) injection syrg 12.5-25 g  25-50 mL IntraVENous PRN    glucagon (GLUCAGEN) injection 1 mg  1 mg IntraMUSCular PRN    insulin lispro (HUMALOG) injection   SubCUTAneous AC&HS       ______________________________________________________________________      Lab Review:     Recent Labs     01/24/21  1432 01/22/21  1406   WBC 12.6* 16.4*   HGB 8.5* 10.5*   HCT 25.7* 31.0*    446*     Recent Labs     01/24/21  1432 01/22/21  1406    136   K 5.3* 5.1   * 105   CO2 21 19*   * 126*   * 112*   CREA 4.01* 4.10*   CA 8.5 9.0     No components found for: GLPOC  No results for input(s): PH, PCO2, PO2, HCO3, FIO2 in the last 72 hours. No results for input(s): INR, INREXT, INREXT, INREXT in the last 72 hours. IMPRESSION:   1. Acute hypoxic respiratory failure   2. COVID-19 pneumonia  3. History of hypertension diabetes mellitus  5. Body mass index is 39.47 kg/m². 4. Acute on chronic kidney disease   5. He recieved ivermectin 12 mg 1 dose and another dose 1/19  6. Bradycardia resolved off beta-blocker  7. Pt is requiring Drug therapy requiring intensive monitoring for toxicity  8. Pt is unstable, unpredictable needing inpatient monitoring; is acutely ill and at high risk of sudden decline and decompensation with severe consequenses and continued end organ dysfunction and failure  9. Prognosis guarded        RECOMMENDATIONS/PLAN:     1. Patient is on  room air   2. Continue with Decadron Zithromax and Augmentin. Patient received 2 dose of 12 mg ivermectin and second dose of ivermectin 12 mg on 1/19  3. On zinc, vit C, vit D  4. Renal function staying steady  creatinine 4,  5. Follow culture results. Thus far no growth  6. Supplemental O2 to keep sats > 93%  7. Aspiration precautions  8. Continue labs to follow electrolytes, renal function and and blood counts  9. Glucose monitoring and SSI  10. Bronchial hygiene with respiratory therapy techniques, bronchodilators  11.  DVT, SUP prophylaxis

## 2021-01-25 NOTE — PROGRESS NOTES
Pulmonary  Progress Note      NAME: Wong Chilel   :  1955  MRM:  [id number]    Date/Time: 2021  9:30 AM         Subjective:   I was asked by Roni Spencer MD to see Wong Chilel  a 77 y.o.    male in consultation      Excerpts from admission 1/15/2021 or consult notes as follows:   78-year-old male came in because of shortness of breath and dyspnea nonproductive cough patient was tested positive for COVID-19 several days ago then he started having worsening of his symptoms of shortness of breath dyspnea cough chest x-ray shows bilateral infiltrate consistent with COVID-19 he was hypoxic he was put on oxygen via nasal cannula 4 L so now pulmonary consult was called for further evaluation.        No Known Allergies      MAR reviewed and pertinent medications noted or modified as needed          Current Facility-Administered Medications   Medication    zinc sulfate (ZINCATE) 220 (50) mg capsule 1 Cap    ascorbic acid (vitamin C) (VITAMIN C) tablet 500 mg    cholecalciferol (VITAMIN D3) tablet 5,000 Units    dexamethasone (DECADRON) 4 mg/mL injection 4 mg    allopurinoL (ZYLOPRIM) tablet 100 mg    amLODIPine (NORVASC) tablet 10 mg    [Held by provider] atenoloL (TENORMIN) tablet 50 mg    calcitRIOL (ROCALTROL) capsule 0.25 mcg    famotidine (PEPCID) tablet 20 mg    hydrALAZINE (APRESOLINE) tablet 100 mg    levothyroxine (SYNTHROID) tablet 50 mcg    sodium chloride (NS) flush 5-40 mL    sodium chloride (NS) flush 5-40 mL    acetaminophen (TYLENOL) tablet 650 mg     Or    acetaminophen (TYLENOL) suppository 650 mg    polyethylene glycol (MIRALAX) packet 17 g    promethazine (PHENERGAN) tablet 12.5 mg     Or    ondansetron (ZOFRAN) injection 4 mg    heparin (porcine) injection 5,000 Units    azithromycin (ZITHROMAX) 500 mg in 0.9% sodium chloride 250 mL (VIAL-MATE)    cefTRIAXone (ROCEPHIN) 1 g in sterile water (preservative free) 10 mL IV syringe    glucose chewable tablet 16 g    dextrose (D50W) injection syrg 12.5-25 g    glucagon (GLUCAGEN) injection 1 mg    insulin lispro (HUMALOG) injection      Patient PCP: Mya Dee DO  PMH:  has a past medical history of CKD (chronic kidney disease) stage 4, GFR 15-29 ml/min ([facility] [address] DM (diabetes mellitus) ([facility] [address] H/O cervical spine surgery, and HTN (hypertension). PSH:   has a past surgical history that includes hx orthopaedic. FHX: family history includes Coronary Artery Disease in his mother; Heart Failure in his mother; Pacemaker in his sister. SHX:  reports that he has never smoked. He has never used smokeless tobacco. He reports previous alcohol use. He reports previous drug use. ROS:     Review of Systems   Constitutional: Positive for malaise/fatigue. HENT: Negative. Eyes: Negative. Respiratory: Positive for cough and shortness of breath. Cardiovascular: Positive for orthopnea. Gastrointestinal: Negative. Genitourinary: Negative. Musculoskeletal: Negative. Neurological: Negative. Endo/Heme/Allergies: Negative. Psychiatric/Behavioral: Negative. Objective:       Vitals:      Last 24hrs VS reviewed since prior progress note. Most recent are:    Visit Vitals  BP (!) 144/86 (BP 1 Location: Left arm)   Pulse 69   Temp 97.2 °F (36.2 °C)   Resp 19   Ht 5' 7\" (1.702 m)   Wt 252 lb (114.3 kg)   SpO2 95%   BMI 39.47 kg/m²     SpO2 Readings from Last 6 Encounters:   01/25/21 95%    O2 Flow Rate (L/min): 1 l/min       Intake/Output Summary (Last 24 hours) at 1/25/2021 0816  Last data filed at 1/25/2021 0739  Gross per 24 hour   Intake 600 ml   Output 1475 ml   Net -875 ml          Exam:   Physical Exam   Constitutional: He appears distressed. HENT:   Head: Normocephalic and atraumatic. Eyes: Pupils are equal, round, and reactive to light. Conjunctivae are normal.   Neck: Normal range of motion. Neck supple. Cardiovascular: Normal rate and regular rhythm. Pulmonary/Chest: He is in respiratory distress. Abdominal: Soft. Bowel sounds are normal.   Musculoskeletal:         General: Edema present. Neurological: He is alert.              Lab Data Reviewed: (see below)      Medications:  Current Facility-Administered Medications   Medication Dose Route Frequency    atenoloL (TENORMIN) tablet 50 mg  50 mg Oral DAILY    azithromycin (ZITHROMAX) tablet 500 mg  500 mg Oral DAILY    amoxicillin-clavulanate (AUGMENTIN) 500-125 mg per tablet 1 Tab  1 Tab Oral Q12H    predniSONE (DELTASONE) tablet 20 mg  20 mg Oral DAILY WITH BREAKFAST    cholecalciferol (VITAMIN D3) tablet 5,000 Units  5,000 Units Oral DAILY    allopurinoL (ZYLOPRIM) tablet 100 mg  100 mg Oral DAILY    amLODIPine (NORVASC) tablet 10 mg  10 mg Oral DAILY    calcitRIOL (ROCALTROL) capsule 0.25 mcg  0.25 mcg Oral DAILY    famotidine (PEPCID) tablet 20 mg  20 mg Oral BID    hydrALAZINE (APRESOLINE) tablet 100 mg  100 mg Oral TID    levothyroxine (SYNTHROID) tablet 50 mcg  50 mcg Oral ACB    acetaminophen (TYLENOL) tablet 650 mg  650 mg Oral Q6H PRN    Or    acetaminophen (TYLENOL) suppository 650 mg  650 mg Rectal Q6H PRN    polyethylene glycol (MIRALAX) packet 17 g  17 g Oral DAILY PRN    promethazine (PHENERGAN) tablet 12.5 mg  12.5 mg Oral Q6H PRN    heparin (porcine) injection 5,000 Units  5,000 Units SubCUTAneous Q8H    glucose chewable tablet 16 g  4 Tab Oral PRN    dextrose (D50W) injection syrg 12.5-25 g  25-50 mL IntraVENous PRN    glucagon (GLUCAGEN) injection 1 mg  1 mg IntraMUSCular PRN    insulin lispro (HUMALOG) injection   SubCUTAneous AC&HS       ______________________________________________________________________      Lab Review:     Recent Labs     01/24/21  1432 01/22/21  1406   WBC 12.6* 16.4*   HGB 8.5* 10.5*   HCT 25.7* 31.0*    446*     Recent Labs     01/24/21  1432 01/22/21  1406    136   K 5.3* 5.1   * 105   CO2 21 19*   * 126*   * 112*   CREA 4.01* 4.10*   CA 8.5 9.0     No components found for: GLPOC  No results for input(s): PH, PCO2, PO2, HCO3, FIO2 in the last 72 hours. No results for input(s): INR, INREXT, INREXT, INREXT in the last 72 hours. IMPRESSION:   1. Acute hypoxic respiratory failure on 1 LNC  2. COVID-19 pneumonia  3. History of hypertension diabetes mellitus  5. Body mass index is 39.47 kg/m². 4. Acute on chronic kidney disease creatinine is 5 his GFR is 14 not a candidate for remdesivir   5. He recieved ivermectin 12 mg 1 dose and another dose 1/19  6. Bradycardia resolved off beta-blocker  7. Pt is requiring Drug therapy requiring intensive monitoring for toxicity  8. Pt is unstable, unpredictable needing inpatient monitoring; is acutely ill and at high risk of sudden decline and decompensation with severe consequenses and continued end organ dysfunction and failure  9. Prognosis guarded        RECOMMENDATIONS/PLAN:     1. Patient is on 1 L nasal Cannula oxygen as salvage oxygen delivery device to provide high concentration of oxygen to overcome refractory hypoxia;  2. Continue with Decadron Zithromax and Augmentin. Patient received 2 dose of 12 mg ivermectin and second dose of ivermectin 12 mg on 1/19  3. On zinc, vit C, vit D  4. His oxygen level still low he needs to be on oxygen when discharged home 2 L nasal cannula  5. Renal function staying steady around CREA of 4, will continue to monitor  6. Follow culture results. Thus far no growth  7. Supplemental O2 to keep sats > 93%  8. Aspiration precautions  9. Continue labs to follow electrolytes, renal function and and blood counts  10. Glucose monitoring and SSI  11. Bronchial hygiene with respiratory therapy techniques, bronchodilators  12.  DVT, SUP prophylaxis

## 2021-01-25 NOTE — PROGRESS NOTES
Hospitalist Progress Note    Subjective:   Daily Progress Note: 1/25/2021 8:26 AM    Hospital Course: Patient is a 68-year-old black male with a history of type 2 diabetes, hypertension, CKD stage IV presented to the ER on 1/15/2021 for generalized weakness, nonproductive cough, shortness of breath. Patient reports that he tested positive for Covid several days ago. In the last 24 hours he had decreased oral intake, loss of appetite, worsening shortness of breath, subjective fever and chills. In the ED patient's oxygen saturation was in the 80s while ambulating and therefore was placed on supplemental oxygen. Chest x-ray showed signs concerning for bilateral pneumonia suspicious for COVID-19. He was given 1 L of hydration with 1 dose of Decadron. D-dimer elevated 2.69. , lactic acid 1.2, procalcitonin 0.28, C-reactive protein 13.50. Patient continues on supplemental oxygen. Patient is on IV Decadron, azithromycin, Rocephin. on zinc, vitamin C, vitamin D. Pulmonary Consulted. Oxygen demands are improving. Will try exercise test monitoring oxygen saturation pending discharge. Developed hypothermia. Persistent tachycardia improved back on home atenolol. VQ scan low probability. Hyperkalemia and  Renal consult pending. Subjective: Patient denies any SOB except with excercise, chest pain. Does have a cough, nonproductive.      Current Facility-Administered Medications   Medication Dose Route Frequency    furosemide (LASIX) injection 80 mg  80 mg IntraVENous ONCE    sodium polystyrene (KAYEXALATE) 15 gram/60 mL oral suspension 15 g  15 g Oral NOW    sodium bicarbonate 8.4 % (1 mEq/mL) injection 50 mEq  50 mEq IntraVENous ONCE    atenoloL (TENORMIN) tablet 50 mg  50 mg Oral DAILY    azithromycin (ZITHROMAX) tablet 500 mg  500 mg Oral DAILY    amoxicillin-clavulanate (AUGMENTIN) 500-125 mg per tablet 1 Tab  1 Tab Oral Q12H    predniSONE (DELTASONE) tablet 20 mg  20 mg Oral DAILY WITH BREAKFAST    cholecalciferol (VITAMIN D3) tablet 5,000 Units  5,000 Units Oral DAILY    allopurinoL (ZYLOPRIM) tablet 100 mg  100 mg Oral DAILY    amLODIPine (NORVASC) tablet 10 mg  10 mg Oral DAILY    calcitRIOL (ROCALTROL) capsule 0.25 mcg  0.25 mcg Oral DAILY    famotidine (PEPCID) tablet 20 mg  20 mg Oral BID    hydrALAZINE (APRESOLINE) tablet 100 mg  100 mg Oral TID    levothyroxine (SYNTHROID) tablet 50 mcg  50 mcg Oral ACB    acetaminophen (TYLENOL) tablet 650 mg  650 mg Oral Q6H PRN    Or    acetaminophen (TYLENOL) suppository 650 mg  650 mg Rectal Q6H PRN    polyethylene glycol (MIRALAX) packet 17 g  17 g Oral DAILY PRN    promethazine (PHENERGAN) tablet 12.5 mg  12.5 mg Oral Q6H PRN    heparin (porcine) injection 5,000 Units  5,000 Units SubCUTAneous Q8H    glucose chewable tablet 16 g  4 Tab Oral PRN    dextrose (D50W) injection syrg 12.5-25 g  25-50 mL IntraVENous PRN    glucagon (GLUCAGEN) injection 1 mg  1 mg IntraMUSCular PRN    insulin lispro (HUMALOG) injection   SubCUTAneous AC&HS        Review of Systems  Constitutional: No fevers, No chills, No sweats, No fatigue, +Weakness  Eyes: No redness  Ears, nose, mouth, throat, and face: No nasal congestion, No sore throat, No voice change  Respiratory: No Shortness of Breath, ++ cough, No wheezing  Cardiovascular: No chest pain, No palpitations, No extremity edema  Gastrointestinal: No nausea, No vomiting, No diarrhea, No abdominal pain  Genitourinary: No frequency, No dysuria, No hematuria  Integument/breast: No skin lesion(s)   Neurological: No Confusion, No headaches, No dizziness      Objective:     Visit Vitals  BP (!) 150/79 (BP 1 Location: Left arm, BP Patient Position: At rest)   Pulse 66   Temp 98.3 °F (36.8 °C)   Resp 18   Ht 5' 7\" (1.702 m)   Wt 114.3 kg (252 lb)   SpO2 96%   BMI 39.47 kg/m²    O2 Flow Rate (L/min): 1 l/min O2 Device: Room air    Temp (24hrs), Av.6 °F (36.4 °C), Min:97.2 °F (36.2 °C), Max:98.3 °F (36.8 °C)       0701 - 01/25 1900  In: -   Out: 275 [Urine:275]  01/23 1901 - 01/25 0700  In: 960 [P.O.:960]  Out: 1200 [Urine:1200]    PHYSICAL EXAM:  Constitutional: No acute distress  Skin: Extremities and face reveal no rashes. HEENT: Sclerae anicteric. Extra-occular muscles are intact. No oral ulcers  Cardiovascular: RRR   Respiratory:  Nonlabored, diminished  GI: Abdomen nondistended, soft, and nontender. Normal active bowel sounds. Musculoskeletal: No pitting edema of the lower legs. Able to move all ext  Neurological:  Patient is alert and oriented. Cranial nerves II-XII grossly intact  Psychiatric: Mood appears appropriate       Data Review    Recent Results (from the past 24 hour(s))   GLUCOSE, POC    Collection Time: 01/24/21  4:03 PM   Result Value Ref Range    Glucose (POC) 119 (H) 65 - 100 mg/dL    Performed by Santana Cabrales    GLUCOSE, POC    Collection Time: 01/24/21  8:21 PM   Result Value Ref Range    Glucose (POC) 138 (H) 65 - 100 mg/dL    Performed by Carlos A Dela Cruz    GLUCOSE, POC    Collection Time: 01/25/21  7:36 AM   Result Value Ref Range    Glucose (POC) 94 65 - 100 mg/dL    Performed by Charlee 150, POC    Collection Time: 01/25/21 11:23 AM   Result Value Ref Range    Glucose (POC) 123 (H) 65 - 100 mg/dL    Performed by Donato Winters    CBC WITH AUTOMATED DIFF    Collection Time: 01/25/21 12:38 PM   Result Value Ref Range    WBC 10.0 4.1 - 11.1 K/uL    RBC 3.71 (L) 4.10 - 5.70 M/uL    HGB 9.7 (L) 12.1 - 17.0 g/dL    HCT 29.4 (L) 36.6 - 50.3 %    MCV 79.2 (L) 80.0 - 99.0 FL    MCH 26.1 26.0 - 34.0 PG    MCHC 33.0 30.0 - 36.5 g/dL    RDW 17.1 (H) 11.5 - 14.5 %    PLATELET 392 601 - 350 K/uL    MPV 10.0 8.9 - 12.9 FL    NEUTROPHILS 88 (H) 32 - 75 %    LYMPHOCYTES 8 (L) 12 - 49 %    MONOCYTES 1 (L) 5 - 13 %    EOSINOPHILS 1 0 - 7 %    BASOPHILS 0 0 - 1 %    IMMATURE GRANULOCYTES 2 (H) 0.0 - 0.5 %    ABS. NEUTROPHILS 9.0 (H) 1.8 - 8.0 K/UL    ABS. LYMPHOCYTES 0.8 0.8 - 3.5 K/UL    ABS. MONOCYTES 0.1 0.0 - 1.0 K/UL    ABS. EOSINOPHILS 0.1 0.0 - 0.4 K/UL    ABS. BASOPHILS 0.0 0.0 - 0.1 K/UL    ABS. IMM. GRANS. 0.2 (H) 0.00 - 0.04 K/UL    DF AUTOMATED     METABOLIC PANEL, COMPREHENSIVE    Collection Time: 01/25/21 12:38 PM   Result Value Ref Range    Sodium 138 136 - 145 mmol/L    Potassium 6.1 (H) 3.5 - 5.1 mmol/L    Chloride 109 (H) 97 - 108 mmol/L    CO2 19 (L) 21 - 32 mmol/L    Anion gap 10 5 - 15 mmol/L    Glucose 94 65 - 100 mg/dL     (H) 6 - 20 mg/dL    Creatinine 3.77 (H) 0.70 - 1.30 mg/dL    BUN/Creatinine ratio 29 (H) 12 - 20      GFR est AA 20 (L) >60 ml/min/1.73m2    GFR est non-AA 16 (L) >60 ml/min/1.73m2    Calcium 9.2 8.5 - 10.1 mg/dL    Bilirubin, total 0.2 0.2 - 1.0 mg/dL    AST (SGOT) 17 15 - 37 U/L    ALT (SGPT) 21 12 - 78 U/L    Alk. phosphatase 63 45 - 117 U/L    Protein, total 6.8 6.4 - 8.2 g/dL    Albumin 2.7 (L) 3.5 - 5.0 g/dL    Globulin 4.1 (H) 2.0 - 4.0 g/dL    A-G Ratio 0.7 (L) 1.1 - 2.2         Radiology review: Chest xray      Assessment/Plan:    1. Bilateral pneumonia concerning for COVID-19  Oxygen saturation within normal limits. Augmentin and azithromycin  Consult pulmonology. Per pulmonology due to renal function patient is not a candidate for remdesivir  Ivermectin per pulmonary  Prednisone  Pulse oximetry exercise test Exercise was 83% on RA, will need home O2, repeat normal    2. Type 2 diabetes  Blood glucose level stable. Continue with insulin sliding scale. 3.  Hypertension  BP stable. Restarted Atenolol due due tachycardia  On norvasc and hydralzine. 4. CKD stage IV  We will continue to monitor. BUN and Cr stable  Hyperkalemia, renal consult, Bicarb, kayalexate and lasix    5. Hypothyroidism  On synthroid. TSH within normal limits    6. CBC and BMP in the AM   7. PT/OT     8. Hypothermia  Work up pending:  Blood culutres  UA pending  CXR normal  TSH normal  Suspect related to Covid    9.  Tachycardia  Restarted home atenolol  VQ scan normal  Improved    CODE STATUS Full      DVT prophylaxis: Heparin  Ulcer prophylaxis: Protonix    Care Plan discussed with: Patient/Family and Nurse     [phone] Niece Liliam Saver, expressing concerns for [facility] [place] and/or rehab    Discussed case with niece. Plan for discharge pending SNF versus home health  Total time spent with patient: 2 minutes.

## 2021-01-25 NOTE — PROGRESS NOTES
PEDRO Plan:    -d/c home when medically stable. -PCP follow up  -[facility] via [facility] for [facility] via [facility]. SOC x 24-48 hours after discharge. Accepting [facility] agency requesting [facility] order be specific re: needs SN, PT, OT.  SW uploaded recent respiratory note and sent to [facility], and patient doesn't qualify for O2 anymore. Requires Medicare 2nd IM letter prior to discharge.       KATHRYN Modi

## 2021-01-26 VITALS
HEIGHT: 67 IN | HEART RATE: 85 BPM | WEIGHT: 252 LBS | OXYGEN SATURATION: 97 % | BODY MASS INDEX: 39.55 KG/M2 | TEMPERATURE: 97.7 F | SYSTOLIC BLOOD PRESSURE: 136 MMHG | RESPIRATION RATE: 17 BRPM | DIASTOLIC BLOOD PRESSURE: 78 MMHG

## 2021-01-26 LAB
ALBUMIN SERPL-MCNC: 2.7 G/DL (ref 3.5–5)
ALBUMIN/GLOB SERPL: 0.7 {RATIO} (ref 1.1–2.2)
ALP SERPL-CCNC: 53 U/L (ref 45–117)
ALT SERPL-CCNC: 20 U/L (ref 12–78)
ANION GAP SERPL CALC-SCNC: 10 MMOL/L (ref 5–15)
AST SERPL W P-5'-P-CCNC: 12 U/L (ref 15–37)
BASOPHILS # BLD: 0 K/UL (ref 0–0.1)
BASOPHILS NFR BLD: 0 % (ref 0–1)
BILIRUB SERPL-MCNC: 0.2 MG/DL (ref 0.2–1)
BUN SERPL-MCNC: 110 MG/DL (ref 6–20)
BUN/CREAT SERPL: 29 (ref 12–20)
CA-I BLD-MCNC: 8.7 MG/DL (ref 8.5–10.1)
CHLORIDE SERPL-SCNC: 108 MMOL/L (ref 97–108)
CO2 SERPL-SCNC: 21 MMOL/L (ref 21–32)
CREAT SERPL-MCNC: 3.85 MG/DL (ref 0.7–1.3)
DIFFERENTIAL METHOD BLD: ABNORMAL
EOSINOPHIL # BLD: 0 K/UL (ref 0–0.4)
EOSINOPHIL NFR BLD: 0 % (ref 0–7)
ERYTHROCYTE [DISTWIDTH] IN BLOOD BY AUTOMATED COUNT: 17 % (ref 11.5–14.5)
GLOBULIN SER CALC-MCNC: 3.7 G/DL (ref 2–4)
GLUCOSE BLD STRIP.AUTO-MCNC: 114 MG/DL (ref 65–100)
GLUCOSE BLD STRIP.AUTO-MCNC: 117 MG/DL (ref 65–100)
GLUCOSE SERPL-MCNC: 102 MG/DL (ref 65–100)
HCT VFR BLD AUTO: 27.1 % (ref 36.6–50.3)
HGB BLD-MCNC: 9 G/DL (ref 12.1–17)
IMM GRANULOCYTES # BLD AUTO: 0.1 K/UL (ref 0–0.04)
IMM GRANULOCYTES NFR BLD AUTO: 1 % (ref 0–0.5)
LYMPHOCYTES # BLD: 0.9 K/UL (ref 0.8–3.5)
LYMPHOCYTES NFR BLD: 12 % (ref 12–49)
MCH RBC QN AUTO: 26 PG (ref 26–34)
MCHC RBC AUTO-ENTMCNC: 33.2 G/DL (ref 30–36.5)
MCV RBC AUTO: 78.3 FL (ref 80–99)
MONOCYTES # BLD: 0.2 K/UL (ref 0–1)
MONOCYTES NFR BLD: 2 % (ref 5–13)
NEUTS SEG # BLD: 6.3 K/UL (ref 1.8–8)
NEUTS SEG NFR BLD: 85 % (ref 32–75)
PERFORMED BY, TECHID: ABNORMAL
PERFORMED BY, TECHID: ABNORMAL
PLATELET # BLD AUTO: 238 K/UL (ref 150–400)
PMV BLD AUTO: 10 FL (ref 8.9–12.9)
POTASSIUM SERPL-SCNC: 5.5 MMOL/L (ref 3.5–5.1)
PROT SERPL-MCNC: 6.4 G/DL (ref 6.4–8.2)
RBC # BLD AUTO: 3.46 M/UL (ref 4.1–5.7)
SODIUM SERPL-SCNC: 139 MMOL/L (ref 136–145)
WBC # BLD AUTO: 7.4 K/UL (ref 4.1–11.1)

## 2021-01-26 PROCEDURE — 97530 THERAPEUTIC ACTIVITIES: CPT

## 2021-01-26 PROCEDURE — 74011636637 HC RX REV CODE- 636/637: Performed by: PHYSICIAN ASSISTANT

## 2021-01-26 PROCEDURE — 82962 GLUCOSE BLOOD TEST: CPT

## 2021-01-26 PROCEDURE — 74011250637 HC RX REV CODE- 250/637: Performed by: PHYSICIAN ASSISTANT

## 2021-01-26 PROCEDURE — 85025 COMPLETE CBC W/AUTO DIFF WBC: CPT

## 2021-01-26 PROCEDURE — 36415 COLL VENOUS BLD VENIPUNCTURE: CPT

## 2021-01-26 PROCEDURE — 80053 COMPREHEN METABOLIC PANEL: CPT

## 2021-01-26 PROCEDURE — 74011250636 HC RX REV CODE- 250/636: Performed by: FAMILY MEDICINE

## 2021-01-26 RX ORDER — AMOXICILLIN AND CLAVULANATE POTASSIUM 500; 125 MG/1; MG/1
1 TABLET, FILM COATED ORAL EVERY 12 HOURS
Qty: 4 TAB | Refills: 0 | Status: SHIPPED | OUTPATIENT
Start: 2021-01-26 | End: 2021-01-28

## 2021-01-26 RX ORDER — PREDNISONE 20 MG/1
20 TABLET ORAL
Qty: 5 TAB | Refills: 0 | Status: SHIPPED | OUTPATIENT
Start: 2021-01-27 | End: 2021-02-01

## 2021-01-26 RX ORDER — SODIUM POLYSTYRENE SULFONATE 15 G/60ML
15 SUSPENSION ORAL; RECTAL ONCE
Status: COMPLETED | OUTPATIENT
Start: 2021-01-26 | End: 2021-01-26

## 2021-01-26 RX ORDER — SODIUM BICARBONATE 650 MG/1
1300 TABLET ORAL 2 TIMES DAILY
Qty: 120 TAB | Refills: 0 | Status: SHIPPED | OUTPATIENT
Start: 2021-01-26 | End: 2021-02-25

## 2021-01-26 RX ADMIN — HEPARIN SODIUM 5000 UNITS: 5000 INJECTION INTRAVENOUS; SUBCUTANEOUS at 05:38

## 2021-01-26 RX ADMIN — ATENOLOL 50 MG: 50 TABLET ORAL at 11:35

## 2021-01-26 RX ADMIN — AZITHROMYCIN MONOHYDRATE 500 MG: 500 TABLET ORAL at 11:35

## 2021-01-26 RX ADMIN — AMLODIPINE BESYLATE 10 MG: 5 TABLET ORAL at 11:35

## 2021-01-26 RX ADMIN — HYDRALAZINE HYDROCHLORIDE 100 MG: 50 TABLET, FILM COATED ORAL at 11:35

## 2021-01-26 RX ADMIN — PREDNISONE 20 MG: 20 TABLET ORAL at 11:35

## 2021-01-26 RX ADMIN — SODIUM POLYSTYRENE SULFONATE 15 G: 15 SUSPENSION ORAL; RECTAL at 15:59

## 2021-01-26 RX ADMIN — CALCITRIOL CAPSULES 0.25 MCG 0.25 MCG: 0.25 CAPSULE ORAL at 11:38

## 2021-01-26 RX ADMIN — AMOXICILLIN AND CLAVULANATE POTASSIUM 1 TABLET: 500; 125 TABLET, FILM COATED ORAL at 11:35

## 2021-01-26 RX ADMIN — FAMOTIDINE 20 MG: 20 TABLET, FILM COATED ORAL at 09:00

## 2021-01-26 RX ADMIN — LEVOTHYROXINE SODIUM 50 MCG: 0.03 TABLET ORAL at 05:39

## 2021-01-26 RX ADMIN — ALLOPURINOL 100 MG: 100 TABLET ORAL at 11:35

## 2021-01-26 RX ADMIN — HEPARIN SODIUM 5000 UNITS: 5000 INJECTION INTRAVENOUS; SUBCUTANEOUS at 14:31

## 2021-01-26 RX ADMIN — CHOLECALCIFEROL TAB 125 MCG (5000 UNIT) 5000 UNITS: 125 TAB at 11:35

## 2021-01-26 NOTE — PROGRESS NOTES
Noted discharge order, pt is calling his niece to see if she can pick him up otherwise we will call a Taxi. PT is confirming pt able to go home with Taxi.

## 2021-01-26 NOTE — PROGRESS NOTES
PHYSICAL THERAPY TREATMENT  Patient: Wong Chilel (89 y.o. male)  Date: 1/26/2021  Diagnosis: COVID-19 [U07.1]  Bilateral pneumonia [J18.9]  Acute respiratory failure with hypoxia (HCC) [J96.01] <principal problem not specified>       Precautions:  droplet plus, falls  Chart, physical therapy assessment, plan of care and goals were reviewed. ASSESSMENT  Patient continues with skilled PT services and is progressing towards goals. Attempted PT RA today due to LOS however, pt deferred OOB mobility due to being on warming blanket at this time, assisted pt with repositioning in bed and performed LE therex as outlined below. Per pt he has been amb in the room with nursing staff and per previous PT notes has been using RW and requiring CGA. Pt semi-supine in bed on warming upon PT arrival, agreeable to session. Pt required SBA for bed mobility; pt deferred OOB mobility at this time. Pt then performed LE therex while semi-supine as listed in therex section. Pt did well with session today currently with no SOB throughout session on RA but was resistant to OOB mobility today. Will continue to benefit from skilled PT services, and will continue to progress as tolerated. Current Level of Function Impacting Discharge (mobility/balance): SBA for bed mobiltiy    Other factors to consider for discharge: lives alone         PLAN :  Patient continues to benefit from skilled intervention to address the above impairments. Continue treatment per established plan of care to address goals. Recommendation for discharge: (in order for the patient to meet his/her long term goals)  HHPT    This discharge recommendation:  Has been made in collaboration with the attending provider and/or case management    IF patient discharges home will need the following DME: none       SUBJECTIVE:   Patient stated I've been getting up and doing my exercises.     OBJECTIVE DATA SUMMARY:   Critical Behavior:  Neurologic State: Alert  Orientation Level: Oriented to person, Oriented to place, Oriented to time, Disoriented to situation  Cognition: Decreased attention/concentration  Safety/Judgement: Good awareness of safety precautions  Functional Mobility Training:  Bed Mobility:  Rolling: Stand-by assistance      Transfers:    not completed this session    Balance:   not assessed this session    Ambulation/Gait Training:    Not completed this session       Therapeutic Exercises:       EXERCISE   Sets   Reps   Active Active Assist   Passive Self ROM   Comments   Ankle Pumps 1 10 [x] [] [] []    Quad Sets/Glut Sets   [] [] [] []    Hamstring Sets   [] [] [] []    Short Arc Quads   [] [] [] []    Heel Slides 1 10 [x] [] [] []    Straight Leg Raises 1 10 [x] [] [] []    Hip abd/add 1 10 [x] [] [] []    Long Arc Quads   [] [] [] []    Marching   [] [] [] []       [] [] [] []       Pain Ratin/10    Activity Tolerance:   Fair  With poor motivation     After treatment patient left in no apparent distress:   Supine in bed, Call bell within reach, Bed / chair alarm activated and Side rails x 3  and nsg updated   GOALS:    Problem: Mobility Impaired (Adult and Pediatric)  Goal: *Acute Goals and Plan of Care (Insert Text)  Description: Pt will be I with LE HEP in 7 days. Pt will perform bed mobility with mod I in 7 days. Pt will perform transfers with mod I in 7 days. Pt will amb  feet with LRAD safely with mod I in 7 days. Outcome: Progressing Towards Goal       COMMUNICATION/COLLABORATION:   The patients plan of care was discussed with: Physical therapy assistant and Registered nurse.      Marina Lama, PT, DPT   Time Calculation: 29 mins

## 2021-01-26 NOTE — PROGRESS NOTES
Bedside shift change report given to Stephane RN (oncoming nurse) by ELIZABETH Sow RN (offgoing nurse). Report included the following information SBAR, Kardex, Intake/Output, MAR and Recent Results.

## 2021-01-26 NOTE — PROGRESS NOTES
Pulmonary  Progress Note      NAME: Bryon Valentine   :  1955  MRM:  [id number]    Date/Time: 2021  9:30 AM         Subjective:   I was asked by Romelia Castleman, MD to see Bryon Valentine  a 77 y.o.    male in consultation      Excerpts from admission 1/15/2021 or consult notes as follows:   58-year-old male came in because of shortness of breath and dyspnea nonproductive cough patient was tested positive for COVID-19 several days ago then he started having worsening of his symptoms of shortness of breath dyspnea cough chest x-ray shows bilateral infiltrate consistent with COVID-19 he was hypoxic he was put on oxygen via nasal cannula 4 L so now pulmonary consult was called for further evaluation.        No Known Allergies      MAR reviewed and pertinent medications noted or modified as needed          Current Facility-Administered Medications   Medication    zinc sulfate (ZINCATE) 220 (50) mg capsule 1 Cap    ascorbic acid (vitamin C) (VITAMIN C) tablet 500 mg    cholecalciferol (VITAMIN D3) tablet 5,000 Units    dexamethasone (DECADRON) 4 mg/mL injection 4 mg    allopurinoL (ZYLOPRIM) tablet 100 mg    amLODIPine (NORVASC) tablet 10 mg    [Held by provider] atenoloL (TENORMIN) tablet 50 mg    calcitRIOL (ROCALTROL) capsule 0.25 mcg    famotidine (PEPCID) tablet 20 mg    hydrALAZINE (APRESOLINE) tablet 100 mg    levothyroxine (SYNTHROID) tablet 50 mcg    sodium chloride (NS) flush 5-40 mL    sodium chloride (NS) flush 5-40 mL    acetaminophen (TYLENOL) tablet 650 mg     Or    acetaminophen (TYLENOL) suppository 650 mg    polyethylene glycol (MIRALAX) packet 17 g    promethazine (PHENERGAN) tablet 12.5 mg     Or    ondansetron (ZOFRAN) injection 4 mg    heparin (porcine) injection 5,000 Units    azithromycin (ZITHROMAX) 500 mg in 0.9% sodium chloride 250 mL (VIAL-MATE)    cefTRIAXone (ROCEPHIN) 1 g in sterile water (preservative free) 10 mL IV syringe    glucose chewable tablet 16 g    dextrose (D50W) injection syrg 12.5-25 g    glucagon (GLUCAGEN) injection 1 mg    insulin lispro (HUMALOG) injection      Patient PCP: Zena Slade DO  PMH:  has a past medical history of CKD (chronic kidney disease) stage 4, GFR 15-29 ml/min ([facility] [address] DM (diabetes mellitus) ([facility] [address] H/O cervical spine surgery, and HTN (hypertension). PSH:   has a past surgical history that includes hx orthopaedic. FHX: family history includes Coronary Artery Disease in his mother; Heart Failure in his mother; Pacemaker in his sister. SHX:  reports that he has never smoked. He has never used smokeless tobacco. He reports previous alcohol use. He reports previous drug use. ROS:     Review of Systems   Constitutional: Positive for malaise/fatigue. HENT: Negative. Eyes: Negative. Respiratory: Positive for cough and shortness of breath. Cardiovascular: Positive for orthopnea. Gastrointestinal: Negative. Genitourinary: Negative. Musculoskeletal: Negative. Neurological: Negative. Endo/Heme/Allergies: Negative. Psychiatric/Behavioral: Negative. Objective:       Vitals:      Last 24hrs VS reviewed since prior progress note. Most recent are:    Visit Vitals  /86 (BP 1 Location: Right arm)   Pulse 62   Temp (!) 95.6 °F (35.3 °C)   Resp 18   Ht 5' 7\" (1.702 m)   Wt 252 lb (114.3 kg)   SpO2 95%   BMI 39.47 kg/m²     SpO2 Readings from Last 6 Encounters:   01/26/21 95%    O2 Flow Rate (L/min): 1 l/min       Intake/Output Summary (Last 24 hours) at 1/26/2021 0819  Last data filed at 1/26/2021 0753  Gross per 24 hour   Intake 340 ml   Output 1925 ml   Net -1585 ml          Exam:   Physical Exam   Constitutional: He appears distressed. HENT:   Head: Normocephalic and atraumatic. Eyes: Pupils are equal, round, and reactive to light. Conjunctivae are normal.   Neck: Normal range of motion. Neck supple. Cardiovascular: Normal rate and regular rhythm. Pulmonary/Chest: He is in respiratory distress. Abdominal: Soft. Bowel sounds are normal.   Musculoskeletal:         General: Edema present. Neurological: He is alert.              Lab Data Reviewed: (see below)      Medications:  Current Facility-Administered Medications   Medication Dose Route Frequency    atenoloL (TENORMIN) tablet 50 mg  50 mg Oral DAILY    azithromycin (ZITHROMAX) tablet 500 mg  500 mg Oral DAILY    amoxicillin-clavulanate (AUGMENTIN) 500-125 mg per tablet 1 Tab  1 Tab Oral Q12H    predniSONE (DELTASONE) tablet 20 mg  20 mg Oral DAILY WITH BREAKFAST    cholecalciferol (VITAMIN D3) tablet 5,000 Units  5,000 Units Oral DAILY    allopurinoL (ZYLOPRIM) tablet 100 mg  100 mg Oral DAILY    amLODIPine (NORVASC) tablet 10 mg  10 mg Oral DAILY    calcitRIOL (ROCALTROL) capsule 0.25 mcg  0.25 mcg Oral DAILY    famotidine (PEPCID) tablet 20 mg  20 mg Oral BID    hydrALAZINE (APRESOLINE) tablet 100 mg  100 mg Oral TID    levothyroxine (SYNTHROID) tablet 50 mcg  50 mcg Oral ACB    acetaminophen (TYLENOL) tablet 650 mg  650 mg Oral Q6H PRN    Or    acetaminophen (TYLENOL) suppository 650 mg  650 mg Rectal Q6H PRN    polyethylene glycol (MIRALAX) packet 17 g  17 g Oral DAILY PRN    promethazine (PHENERGAN) tablet 12.5 mg  12.5 mg Oral Q6H PRN    heparin (porcine) injection 5,000 Units  5,000 Units SubCUTAneous Q8H    glucose chewable tablet 16 g  4 Tab Oral PRN    dextrose (D50W) injection syrg 12.5-25 g  25-50 mL IntraVENous PRN    glucagon (GLUCAGEN) injection 1 mg  1 mg IntraMUSCular PRN    insulin lispro (HUMALOG) injection   SubCUTAneous AC&HS       ______________________________________________________________________      Lab Review:     Recent Labs     01/25/21  1238 01/24/21  1432   WBC 10.0 12.6*   HGB 9.7* 8.5*   HCT 29.4* 25.7*    318     Recent Labs     01/25/21  1238 01/24/21  1432    138   K 6.1* 5.3*   * 110*   CO2 19* 21   GLU 94 119*   * 116*   CREA 3.77* 4.01*   CA 9.2 8.5   ALB 2.7*  --    ALT 21  --      No components found for: GLPOC  No results for input(s): PH, PCO2, PO2, HCO3, FIO2 in the last 72 hours. No results for input(s): INR, INREXT, INREXT, INREXT in the last 72 hours. IMPRESSION:   1. Acute hypoxic respiratory failure   2. COVID-19 pneumonia  3. History of hypertension diabetes mellitus  5. Body mass index is 39.47 kg/m². 4. Acute on chronic kidney disease   5. He recieved ivermectin 12 mg 1 dose and another dose 1/19  6. Bradycardia resolved off beta-blocker  7. Pt is requiring Drug therapy requiring intensive monitoring for toxicity  8. Pt is unstable, unpredictable needing inpatient monitoring; is acutely ill and at high risk of sudden decline and decompensation with severe consequenses and continued end organ dysfunction and failure  9. Prognosis guarded        RECOMMENDATIONS/PLAN:     1. Patient is on 1 L O2 NC will wean back to room air as tolerated  2. Continue with Decadron Zithromax and Augmentin. Patient received 2 dose of 12 mg ivermectin and second dose of ivermectin 12 mg on 1/19  3. On zinc, vit C, vit D  4. Renal function staying steady  creatinine 3.77  5. Follow culture results. Thus far no growth  6. Supplemental O2 to keep sats > 93%  7. Aspiration precautions  8. Continue labs to follow electrolytes, renal function and and blood counts  9. Glucose monitoring and SSI  10. Bronchial hygiene with respiratory therapy techniques, bronchodilators  11.  DVT, SUP prophylaxis

## 2021-01-26 NOTE — PROGRESS NOTES
PEDRO Plan:    -d/c home  -PCP follow up  -[facility] via [facility]       Patient to d/c home today. Niece to p/u and transport home @ 17:00PM.  PCP follow up. HH SOC x 24-48 hours after discharge. Medicare pt has received, reviewed, and signed 2nd IM letter informing them of their right to appeal the discharge. Signed copy has been placed on pt bedside chart. Patient clear to d/c from SW standpoint.       KATHRYN Dai

## 2021-01-26 NOTE — PROGRESS NOTES
Reviewed AVS with pt regarding new medications, restrictions, follow up appointments etc.Voiced understanding and in agreement. Awating niece to  pt, will assist pt out to car via w/c. Removed PIV.VSS.   Removed heating blanket at about 1530

## 2021-01-26 NOTE — PROGRESS NOTES
Problem: Falls - Risk of  Goal: *Absence of Falls  Description: Document Phil Eugenia Fall Risk and appropriate interventions in the flowsheet.   Outcome: Progressing Towards Goal  Note: Fall Risk Interventions:  Mobility Interventions: Bed/chair exit alarm    Mentation Interventions: Bed/chair exit alarm    Medication Interventions: Bed/chair exit alarm    Elimination Interventions: Bed/chair exit alarm              Problem: Patient Education: Go to Patient Education Activity  Goal: Patient/Family Education  Outcome: Progressing Towards Goal

## 2021-01-26 NOTE — PROGRESS NOTES
Pulmonary  Progress Note      NAME: Bryon Valentine   :  1955  MRM:  [id number]    Date/Time: 2021  9:30 AM         Subjective:   I was asked by Romelia Castleman, MD to see Bryon Valentine  a 77 y.o.    male in consultation      Excerpts from admission 1/15/2021 or consult notes as follows:   24-year-old male came in because of shortness of breath and dyspnea nonproductive cough patient was tested positive for COVID-19 several days ago then he started having worsening of his symptoms of shortness of breath dyspnea cough chest x-ray shows bilateral infiltrate consistent with COVID-19 he was hypoxic he was put on oxygen via nasal cannula 4 L so now pulmonary consult was called for further evaluation.        No Known Allergies      MAR reviewed and pertinent medications noted or modified as needed          Current Facility-Administered Medications   Medication    zinc sulfate (ZINCATE) 220 (50) mg capsule 1 Cap    ascorbic acid (vitamin C) (VITAMIN C) tablet 500 mg    cholecalciferol (VITAMIN D3) tablet 5,000 Units    dexamethasone (DECADRON) 4 mg/mL injection 4 mg    allopurinoL (ZYLOPRIM) tablet 100 mg    amLODIPine (NORVASC) tablet 10 mg    [Held by provider] atenoloL (TENORMIN) tablet 50 mg    calcitRIOL (ROCALTROL) capsule 0.25 mcg    famotidine (PEPCID) tablet 20 mg    hydrALAZINE (APRESOLINE) tablet 100 mg    levothyroxine (SYNTHROID) tablet 50 mcg    sodium chloride (NS) flush 5-40 mL    sodium chloride (NS) flush 5-40 mL    acetaminophen (TYLENOL) tablet 650 mg     Or    acetaminophen (TYLENOL) suppository 650 mg    polyethylene glycol (MIRALAX) packet 17 g    promethazine (PHENERGAN) tablet 12.5 mg     Or    ondansetron (ZOFRAN) injection 4 mg    heparin (porcine) injection 5,000 Units    azithromycin (ZITHROMAX) 500 mg in 0.9% sodium chloride 250 mL (VIAL-MATE)    cefTRIAXone (ROCEPHIN) 1 g in sterile water (preservative free) 10 mL IV syringe    glucose chewable tablet 16 g    dextrose (D50W) injection syrg 12.5-25 g    glucagon (GLUCAGEN) injection 1 mg    insulin lispro (HUMALOG) injection      Patient PCP: Win Cao DO  PMH:  has a past medical history of CKD (chronic kidney disease) stage 4, GFR 15-29 ml/min ([facility] [address] DM (diabetes mellitus) ([facility] [address] H/O cervical spine surgery, and HTN (hypertension). PSH:   has a past surgical history that includes hx orthopaedic. FHX: family history includes Coronary Artery Disease in his mother; Heart Failure in his mother; Pacemaker in his sister. SHX:  reports that he has never smoked. He has never used smokeless tobacco. He reports previous alcohol use. He reports previous drug use. ROS:     Review of Systems   Constitutional: Positive for malaise/fatigue. HENT: Negative. Eyes: Negative. Respiratory: Positive for cough and shortness of breath. Cardiovascular: Positive for orthopnea. Gastrointestinal: Negative. Genitourinary: Negative. Musculoskeletal: Negative. Neurological: Negative. Endo/Heme/Allergies: Negative. Psychiatric/Behavioral: Negative. Objective:       Vitals:      Last 24hrs VS reviewed since prior progress note. Most recent are:    Visit Vitals  /86 (BP 1 Location: Right arm)   Pulse 62   Temp (!) 95.6 °F (35.3 °C)   Resp 18   Ht 5' 7\" (1.702 m)   Wt 114.3 kg (252 lb)   SpO2 95%   BMI 39.47 kg/m²     SpO2 Readings from Last 6 Encounters:   01/26/21 95%    O2 Flow Rate (L/min): 1 l/min       Intake/Output Summary (Last 24 hours) at 1/26/2021 0936  Last data filed at 1/26/2021 0753  Gross per 24 hour   Intake 340 ml   Output 1925 ml   Net -1585 ml          Exam:   Physical Exam   Constitutional: He appears distressed. HENT:   Head: Normocephalic and atraumatic. Eyes: Pupils are equal, round, and reactive to light. Conjunctivae are normal.   Neck: Normal range of motion. Neck supple. Cardiovascular: Normal rate and regular rhythm. Pulmonary/Chest: He is in respiratory distress. Abdominal: Soft. Bowel sounds are normal.   Musculoskeletal:         General: Edema present. Neurological: He is alert.              Lab Data Reviewed: (see below)      Medications:  Current Facility-Administered Medications   Medication Dose Route Frequency    atenoloL (TENORMIN) tablet 50 mg  50 mg Oral DAILY    azithromycin (ZITHROMAX) tablet 500 mg  500 mg Oral DAILY    amoxicillin-clavulanate (AUGMENTIN) 500-125 mg per tablet 1 Tab  1 Tab Oral Q12H    predniSONE (DELTASONE) tablet 20 mg  20 mg Oral DAILY WITH BREAKFAST    cholecalciferol (VITAMIN D3) tablet 5,000 Units  5,000 Units Oral DAILY    allopurinoL (ZYLOPRIM) tablet 100 mg  100 mg Oral DAILY    amLODIPine (NORVASC) tablet 10 mg  10 mg Oral DAILY    calcitRIOL (ROCALTROL) capsule 0.25 mcg  0.25 mcg Oral DAILY    famotidine (PEPCID) tablet 20 mg  20 mg Oral BID    hydrALAZINE (APRESOLINE) tablet 100 mg  100 mg Oral TID    levothyroxine (SYNTHROID) tablet 50 mcg  50 mcg Oral ACB    acetaminophen (TYLENOL) tablet 650 mg  650 mg Oral Q6H PRN    Or    acetaminophen (TYLENOL) suppository 650 mg  650 mg Rectal Q6H PRN    polyethylene glycol (MIRALAX) packet 17 g  17 g Oral DAILY PRN    promethazine (PHENERGAN) tablet 12.5 mg  12.5 mg Oral Q6H PRN    heparin (porcine) injection 5,000 Units  5,000 Units SubCUTAneous Q8H    glucose chewable tablet 16 g  4 Tab Oral PRN    dextrose (D50W) injection syrg 12.5-25 g  25-50 mL IntraVENous PRN    glucagon (GLUCAGEN) injection 1 mg  1 mg IntraMUSCular PRN    insulin lispro (HUMALOG) injection   SubCUTAneous AC&HS       ______________________________________________________________________      Lab Review:     Recent Labs     01/26/21  0710 01/25/21  1238 01/24/21  1432   WBC 7.4 10.0 12.6*   HGB 9.0* 9.7* 8.5*   HCT 27.1* 29.4* 25.7*    272 318     Recent Labs     01/26/21  0710 01/25/21  1238 01/24/21  1432    138 138   K 5.5* 6.1* 5.3*    109* 110*   CO2 21 19* 21   * 94 119*   * 108* 116*   CREA 3.85* 3.77* 4.01*   CA 8.7 9.2 8.5   ALB 2.7* 2.7*  --    ALT 20 21  --      No components found for: GLPOC  No results for input(s): PH, PCO2, PO2, HCO3, FIO2 in the last 72 hours. No results for input(s): INR, INREXT, INREXT, INREXT in the last 72 hours. IMPRESSION:   1. Acute hypoxic respiratory failure   2. COVID-19 pneumonia  3. History of hypertension diabetes mellitus  5. Body mass index is 39.47 kg/m². 4. Acute on chronic kidney disease   5. He recieved ivermectin 12 mg 1 dose and another dose 1/19  6. Bradycardia resolved off beta-blocker  SpO2 on room air ,at rest=96%. SpO2 while ambulating, on room air= 92%.       RECOMMENDATIONS/PLAN:     1. Patient is on 1 L O2 NC will wean back to room air as tolerated  2. Continue with Decadron Zithromax and Augmentin. Patient received 2 dose of 12 mg ivermectin and second dose of ivermectin 12 mg on 1/19  3. On zinc, vit C, vit D  4. Renal function staying steady  creatinine 3.77  5. Follow culture results. Thus far no growth  6.  No need for oxygen

## 2021-01-26 NOTE — PROGRESS NOTES
Consult Date: 1/26/2021    Consults  Elevated creatinine and hyperkalemia  Subjective      General 24-year-old -American male with a history of CKD stage IV, hypertension, diabetes mellitus, who was admitted for management of Covid 19 pneumonia. He appears to be doing well and is not dependent on any oxygen at this time. We are anticipating discharge soon on this patient. His potassium has been found to be high and we are asked to follow.   He follow up outpatient with Dr Emily Aly    Past Medical History:   Diagnosis Date    CKD (chronic kidney disease) stage 4, GFR 15-29 ml/min ([facility])     DM (diabetes mellitus) ([facility] [address]     H/O cervical spine surgery     HTN (hypertension)       Past Surgical History:   Procedure Laterality Date    HX ORTHOPAEDIC      Spine Surgery     Family History   Problem Relation Age of Onset    Coronary Artery Disease Mother     Heart Failure Mother     Pacemaker Sister       Social History     Tobacco Use    Smoking status: Never Smoker    Smokeless tobacco: Never Used   Substance Use Topics    Alcohol use: Not Currently       Current Facility-Administered Medications   Medication Dose Route Frequency Provider Last Rate Last Admin    sodium polystyrene (KAYEXALATE) 15 gram/60 mL oral suspension 15 g  15 g Oral ONCE Sunshine Soliman PA-C        atenoloL (TENORMIN) tablet 50 mg  50 mg Oral DAILY ReasonGeoffrey thrasher PA-C   50 mg at 01/26/21 1135    azithromycin (ZITHROMAX) tablet 500 mg  500 mg Oral DAILY ReasonGeoffrey thrasher PA-C   500 mg at 01/26/21 1135    amoxicillin-clavulanate (AUGMENTIN) 500-125 mg per tablet 1 Tab  1 Tab Oral Q12H ReasonGeoffrey thrasher PA-C   1 Tab at 01/26/21 1135    predniSONE (DELTASONE) tablet 20 mg  20 mg Oral DAILY WITH BREAKFAST Geoffrey Centeno PA-C   20 mg at 01/26/21 1135    cholecalciferol (VITAMIN D3) tablet 5,000 Units  5,000 Units Oral DAILY Sunshine Soliman PA-C   5,000 Units at 01/26/21 1135    allopurinoL (ZYLOPRIM) tablet 100 mg 100 mg Oral DAILY Sunshine Soliman PA-C   100 mg at 01/26/21 1135    amLODIPine (NORVASC) tablet 10 mg  10 mg Oral DAILY Sunshine Soliman PA-C   10 mg at 01/26/21 1135    calcitRIOL (ROCALTROL) capsule 0.25 mcg  0.25 mcg Oral DAILY Sunshine Soliman PA-C   0.25 mcg at 01/26/21 1138    famotidine (PEPCID) tablet 20 mg  20 mg Oral BID Sunshine Soliman PA-C   20 mg at 01/26/21 0900    hydrALAZINE (APRESOLINE) tablet 100 mg  100 mg Oral TID ESSENCE Escobar-C   100 mg at 01/26/21 1135    levothyroxine (SYNTHROID) tablet 50 mcg  50 mcg Oral ACB Sunshine Soliman PA-C   50 mcg at 01/26/21 0539    acetaminophen (TYLENOL) tablet 650 mg  650 mg Oral Q6H PRN Elise Mares MD        Or    acetaminophen (TYLENOL) suppository 650 mg  650 mg Rectal Q6H PRN Elise Mares MD        polyethylene glycol (MIRALAX) packet 17 g  17 g Oral DAILY PRN Elise Mares MD        promethazine (PHENERGAN) tablet 12.5 mg  12.5 mg Oral Q6H PRN Elise Mares MD        heparin (porcine) injection 5,000 Units  5,000 Units SubCUTAneous Q8H Elise Mares MD   5,000 Units at 01/26/21 8739    glucose chewable tablet 16 g  4 Tab Oral PRN Elise Mares MD        dextrose (D50W) injection syrg 12.5-25 g  25-50 mL IntraVENous PRN Elise Mares MD        glucagon (GLUCAGEN) injection 1 mg  1 mg IntraMUSCular PRN Elise Mares MD        insulin lispro (HUMALOG) injection   SubCUTAneous AC&HS Elise Mares MD   Stopped at 01/25/21 2200        Review of Systems   Constitutional: Negative for activity change, fatigue and fever. Gastrointestinal: Negative for abdominal distention. Musculoskeletal: Positive for arthralgias. Negative for joint swelling. All other systems reviewed and are negative.       Objective     Vital signs for last 24 hours:  Visit Vitals  /86 (BP 1 Location: Right arm)   Pulse 62   Temp 97.8 °F (36.6 °C)   Resp 18   Ht 5' 7\" (1.702 m)   Wt 114.3 kg (252 lb)   SpO2 95%   BMI 39.47 kg/m²       Intake/Output this shift:  Current Shift: 01/26 0701 - 01/26 1900  In: -   Out: 300 [Urine:300]  Last 3 Shifts: 01/24 1901 - 01/26 0700  In: 640 [P.O.:640]  Out: 2350 [Urine:2350]  Physical Exam   Constitutional: He is oriented to person, place, and time. He appears well-developed and well-nourished. HENT:   Head: Normocephalic and atraumatic. Cardiovascular: Normal rate and regular rhythm. Pulmonary/Chest: Effort normal and breath sounds normal.   Abdominal: Soft. Bowel sounds are normal.   Neurological: He is alert and oriented to person, place, and time. Skin: Skin is warm and dry. Psychiatric: He has a normal mood and affect. His behavior is normal. Thought content normal.   Nursing note and vitals reviewed. Data Review:   Recent Results (from the past 24 hour(s))   GLUCOSE, POC    Collection Time: 01/25/21  3:26 PM   Result Value Ref Range    Glucose (POC) 200 (H) 65 - 100 mg/dL    Performed by [facility]    GLUCOSE, POC    Collection Time: 01/25/21  9:43 PM   Result Value Ref Range    Glucose (POC) 102 (H) 65 - 100 mg/dL    Performed by RERE GONZALEZ    CBC WITH AUTOMATED DIFF    Collection Time: 01/26/21  7:10 AM   Result Value Ref Range    WBC 7.4 4.1 - 11.1 K/uL    RBC 3.46 (L) 4.10 - 5.70 M/uL    HGB 9.0 (L) 12.1 - 17.0 g/dL    HCT 27.1 (L) 36.6 - 50.3 %    MCV 78.3 (L) 80.0 - 99.0 FL    MCH 26.0 26.0 - 34.0 PG    MCHC 33.2 30.0 - 36.5 g/dL    RDW 17.0 (H) 11.5 - 14.5 %    PLATELET 374 318 - 652 K/uL    MPV 10.0 8.9 - 12.9 FL    NEUTROPHILS 85 (H) 32 - 75 %    LYMPHOCYTES 12 12 - 49 %    MONOCYTES 2 (L) 5 - 13 %    EOSINOPHILS 0 0 - 7 %    BASOPHILS 0 0 - 1 %    IMMATURE GRANULOCYTES 1 (H) 0.0 - 0.5 %    ABS. NEUTROPHILS 6.3 1.8 - 8.0 K/UL    ABS. LYMPHOCYTES 0.9 0.8 - 3.5 K/UL    ABS. MONOCYTES 0.2 0.0 - 1.0 K/UL    ABS. EOSINOPHILS 0.0 0.0 - 0.4 K/UL    ABS. BASOPHILS 0.0 0.0 - 0.1 K/UL    ABS. IMM.  GRANS. 0.1 (H) 0.00 - 0.04 K/UL    DF AUTOMATED     METABOLIC PANEL, COMPREHENSIVE    Collection Time: 01/26/21  7:10 AM   Result Value Ref Range    Sodium 139 136 - 145 mmol/L    Potassium 5.5 (H) 3.5 - 5.1 mmol/L    Chloride 108 97 - 108 mmol/L    CO2 21 21 - 32 mmol/L    Anion gap 10 5 - 15 mmol/L    Glucose 102 (H) 65 - 100 mg/dL     (H) 6 - 20 mg/dL    Creatinine 3.85 (H) 0.70 - 1.30 mg/dL    BUN/Creatinine ratio 29 (H) 12 - 20      GFR est AA 19 (L) >60 ml/min/1.73m2    GFR est non-AA 16 (L) >60 ml/min/1.73m2    Calcium 8.7 8.5 - 10.1 mg/dL    Bilirubin, total 0.2 0.2 - 1.0 mg/dL    AST (SGOT) 12 (L) 15 - 37 U/L    ALT (SGPT) 20 12 - 78 U/L    Alk. phosphatase 53 45 - 117 U/L    Protein, total 6.4 6.4 - 8.2 g/dL    Albumin 2.7 (L) 3.5 - 5.0 g/dL    Globulin 3.7 2.0 - 4.0 g/dL    A-G Ratio 0.7 (L) 1.1 - 2.2     GLUCOSE, POC    Collection Time: 01/26/21  7:47 AM   Result Value Ref Range    Glucose (POC) 114 (H) 65 - 100 mg/dL    Performed by [facility]    GLUCOSE, POC    Collection Time: 01/26/21 11:03 AM   Result Value Ref Range    Glucose (POC) 117 (H) 65 - 100 mg/dL    Performed by [facility]          NM LUNG SCAN PERF   Final Result      XR CHEST PORT   Final Result      XR CHEST PORT   Final Result      XR CHEST PORT   Final Result   Findings/impression:      Lungs are underinflated. Scattered interstitial/hazy airspace disease, decreased   compared to prior examination. Small pleural effusions are suspected. No evidence of pneumothorax. Cardiomediastinal contours are stable. Visualized osseous structures are unchanged. XR CHEST SNGL V   Final Result   Impression: Airspace opacities in the lungs compatible with Covid-19 pneumonia. Patient Active Problem List   Diagnosis Code    Bilateral pneumonia J18.9    Acute respiratory failure with hypoxia ([address] J96.01    COVID-19 U07.1        DIAGNOSES:  1. Acute kidney injury on chronic kidney disease  2. CKD stage IV  3. Hyperkalemia  4. Metabolic acidosis  5.  CoVID 19 pneumonia      PLAN:   Would add sodium bicarbonate 1300 BID  Low K diet  OP follow up in 10 days time with nephrologgy  Blood tests in 3 days time- would suggest CBC and renal panel

## 2021-01-26 NOTE — DISCHARGE SUMMARY
Hospitalist Discharge Summary     Patient ID:    Krys Wu  [id number]  81 y.o.  1955    Admit date: 1/15/2021    Discharge date : 1/26/2021    Chronic Diagnoses:    Problem List as of 1/26/2021 Date Reviewed: 1/15/2021          Codes Class Noted - Resolved    Bilateral pneumonia ICD-10-CM: J18.9  ICD-9-CM: 486  1/15/2021 - Present        Acute respiratory failure with hypoxia ([facility] ICD-10-CM: J96.01  ICD-9-CM: 518.81  1/15/2021 - Present        COVID-19 ICD-10-CM: U07.1  ICD-9-CM: 079.89  1/15/2021 - Present              Final Diagnoses:   1. BL PNA due to COVID 19  2. Acute hypoxic Respiratory failure  3. Type 2 DM  4. HTN  5.  CKD IV  6. Hypothyroidism  7. Hypothermia      Reason for Hospitalization: SOB      Hospital Course: Patient is a 55-year-old black male with a history of type 2 diabetes, hypertension, CKD stage IV presented to the ER on 1/15/2021 for generalized weakness, nonproductive cough, shortness of breath. Patient reports that he tested positive for Covid several days ago. In the last 24 hours he had decreased oral intake, loss of appetite, worsening shortness of breath, subjective fever and chills. In the ED patient's oxygen saturation was in the 80s while ambulating and therefore was placed on supplemental oxygen. Chest x-ray showed signs concerning for bilateral pneumonia suspicious for COVID-19. He was given 1 L of hydration with 1 dose of Decadron. D-dimer elevated 2.69. , lactic acid 1.2, procalcitonin 0.28, C-reactive protein 13.50. Patient continues on supplemental oxygen. Patient is on IV Decadron, azithromycin, Rocephin. on zinc, vitamin C, vitamin D. Pulmonary Consulted. Oxygen demands are improving. Will try exercise test monitoring oxygen saturation pending discharge. Developed hypothermia. Persistent tachycardia improved back on home atenolol. VQ scan low probability. Hyperkalemia and given kayexalate. Nephrology consulted. Recommend to dc on Sodium Bicarbonate 1300mg BID. Resp Therapy consulted and patient does not qualify for home oxygen. [place] stable for discharge home with follow up with PCP, Pulmonology, and Nephrology in 1-2 weeks      Discharge Medications:   Current Discharge Medication List      START taking these medications    Details   predniSONE (DELTASONE) 20 mg tablet Take 20 mg by mouth daily (with breakfast) for 5 days. Qty: 5 Tab, Refills: 0      amoxicillin-clavulanate (AUGMENTIN) 500-125 mg per tablet Take 1 Tab by mouth every twelve (12) hours for 2 days. Qty: 4 Tab, Refills: 0      sodium bicarbonate 650 mg tablet Take 2 Tabs by mouth two (2) times a day for 30 days. Qty: 120 Tab, Refills: 0         CONTINUE these medications which have NOT CHANGED    Details   atenoloL (TENORMIN) 100 mg tablet Take 50 mg by mouth daily. hydrALAZINE (APRESOLINE) 50 mg tablet Take 100 mg by mouth three (3) times daily. famotidine (Pepcid AC) 20 mg tablet Take 20 mg by mouth two (2) times a day. levothyroxine (SYNTHROID) 50 mcg tablet Take 50 mcg by mouth Daily (before breakfast). calcitRIOL (ROCALTROL) 0.25 mcg capsule Take 0.25 mcg by mouth daily. allopurinoL (ZYLOPRIM) 100 mg tablet Take 100 mg by mouth daily. 1.5 tab      amLODIPine (Norvasc) 10 mg tablet Take 10 mg by mouth daily. STOP taking these medications       furosemide (Lasix) 40 mg tablet Comments:   Reason for Stopping: Follow up Care:    1. Jaden Palmer DO in 1-2 weeks.       Follow-up Information     Follow up With Specialties Details Why [address] [facility], [facility], [address] In 2 weeks  [facility]  [phone]      Nafisa Duque MD Internal Medicine In 2 weeks  [address]  [phone]      Arnav Hamilton MD Pulmonary Disease In 2 weeks  [address] [facility]  [address]  [phone]              Patient Follow Up Instructions: Activity: Activity as tolerated  Diet:  Diabetic Diet    Condition at Discharge:  Stable  __________________________________________________________________    Disposition  Home with family and home health services  ____________________________________________________________________    Code Status: Full Code  ___________________________________________________________________    Discharge Exam:  Patient seen and examined by me on discharge day. Pertinent Findings:  Gen:    Not in distress  Chest: Clear lungs  CVS:   Regular rhythm. No edema  Abd:  Soft, not distended, not tender  Neuro:  Alert    CONSULTATIONS: Pulmonary/Intensive care and Nephrology    Significant Diagnostic Studies:   Recent Labs     01/26/21  0710 01/25/21  1238   WBC 7.4 10.0   HGB 9.0* 9.7*   HCT 27.1* 29.4*    272     Recent Labs     01/26/21  0710 01/25/21  1238 01/24/21  1432    138 138   K 5.5* 6.1* 5.3*    109* 110*   CO2 21 19* 21   * 108* 116*   CREA 3.85* 3.77* 4.01*   * 94 119*   CA 8.7 9.2 8.5     Recent Labs     01/26/21  0710 01/25/21  1238   ALT 20 21   AP 53 63   TBILI 0.2 0.2   TP 6.4 6.8   ALB 2.7* 2.7*   GLOB 3.7 4.1*     No results for input(s): INR, PTP, APTT, INREXT in the last 72 hours. No results for input(s): FE, TIBC, PSAT, FERR in the last 72 hours. No results for input(s): PH, PCO2, PO2 in the last 72 hours. No results for input(s): CPK, CKMB in the last 72 hours.     No lab exists for component: TROPONINI  Lab Results   Component Value Date/Time    Glucose (POC) 117 (H) 01/26/2021 11:03 AM    Glucose (POC) 114 (H) 01/26/2021 07:47 AM    Glucose (POC) 102 (H) 01/25/2021 09:43 PM    Glucose (POC) 200 (H) 01/25/2021 03:26 PM    Glucose (POC) 123 (H) 01/25/2021 11:23 AM         Total Time: >30 min     Signed:  Leland Sol PA-C  1/26/2021  1:28 PM

## 2021-01-26 NOTE — PROGRESS NOTES
Hospitalist Progress Note    Subjective:   Daily Progress Note: 1/26/2021 8:26 AM    Hospital Course: Patient is a 29-year-old black male with a history of type 2 diabetes, hypertension, CKD stage IV presented to the ER on 1/15/2021 for generalized weakness, nonproductive cough, shortness of breath. Patient reports that he tested positive for Covid several days ago. In the last 24 hours he had decreased oral intake, loss of appetite, worsening shortness of breath, subjective fever and chills. In the ED patient's oxygen saturation was in the 80s while ambulating and therefore was placed on supplemental oxygen. Chest x-ray showed signs concerning for bilateral pneumonia suspicious for COVID-19. He was given 1 L of hydration with 1 dose of Decadron. D-dimer elevated 2.69. , lactic acid 1.2, procalcitonin 0.28, C-reactive protein 13.50. Patient continues on supplemental oxygen. Patient is on IV Decadron, azithromycin, Rocephin. on zinc, vitamin C, vitamin D. Pulmonary Consulted. Oxygen demands are improving. Will try exercise test monitoring oxygen saturation pending discharge. Developed hypothermia. Persistent tachycardia improved back on home atenolol. VQ scan low probability. Hyperkalemia and  Renal consult pending.     Subjective: Patient denies any SOB or chest pain    Current Facility-Administered Medications   Medication Dose Route Frequency    atenoloL (TENORMIN) tablet 50 mg  50 mg Oral DAILY    azithromycin (ZITHROMAX) tablet 500 mg  500 mg Oral DAILY    amoxicillin-clavulanate (AUGMENTIN) 500-125 mg per tablet 1 Tab  1 Tab Oral Q12H    predniSONE (DELTASONE) tablet 20 mg  20 mg Oral DAILY WITH BREAKFAST    cholecalciferol (VITAMIN D3) tablet 5,000 Units  5,000 Units Oral DAILY    allopurinoL (ZYLOPRIM) tablet 100 mg  100 mg Oral DAILY    amLODIPine (NORVASC) tablet 10 mg  10 mg Oral DAILY    calcitRIOL (ROCALTROL) capsule 0.25 mcg  0.25 mcg Oral DAILY    famotidine (PEPCID) tablet 20 mg  20 mg Oral BID    hydrALAZINE (APRESOLINE) tablet 100 mg  100 mg Oral TID    levothyroxine (SYNTHROID) tablet 50 mcg  50 mcg Oral ACB    acetaminophen (TYLENOL) tablet 650 mg  650 mg Oral Q6H PRN    Or    acetaminophen (TYLENOL) suppository 650 mg  650 mg Rectal Q6H PRN    polyethylene glycol (MIRALAX) packet 17 g  17 g Oral DAILY PRN    promethazine (PHENERGAN) tablet 12.5 mg  12.5 mg Oral Q6H PRN    heparin (porcine) injection 5,000 Units  5,000 Units SubCUTAneous Q8H    glucose chewable tablet 16 g  4 Tab Oral PRN    dextrose (D50W) injection syrg 12.5-25 g  25-50 mL IntraVENous PRN    glucagon (GLUCAGEN) injection 1 mg  1 mg IntraMUSCular PRN    insulin lispro (HUMALOG) injection   SubCUTAneous AC&HS        Review of Systems  Constitutional: No fevers, No chills, No sweats, No fatigue, +Weakness  Eyes: No redness  Ears, nose, mouth, throat, and face: No nasal congestion, No sore throat, No voice change  Respiratory: No Shortness of Breath, ++ cough, No wheezing  Cardiovascular: No chest pain, No palpitations, No extremity edema  Gastrointestinal: No nausea, No vomiting, No diarrhea, No abdominal pain  Genitourinary: No frequency, No dysuria, No hematuria  Integument/breast: No skin lesion(s)   Neurological: No Confusion, No headaches, No dizziness      Objective:     Visit Vitals  /86 (BP 1 Location: Right arm)   Pulse 62   Temp (!) 95.6 °F (35.3 °C)   Resp 18   Ht 5' 7\" (1.702 m)   Wt 114.3 kg (252 lb)   SpO2 95%   BMI 39.47 kg/m²    O2 Flow Rate (L/min): 1 l/min O2 Device: Room air    Temp (24hrs), Av.8 °F (36 °C), Min:95.6 °F (35.3 °C), Max:98 °F (36.7 °C)      701 - 1900  In: -   Out: 300 [Urine:300]  1901 -  0700  In: 640 [P.O.:640]  Out: 2350 [Urine:2350]    PHYSICAL EXAM:  Constitutional: No acute distress  Skin: Extremities and face reveal no rashes. HEENT: Sclerae anicteric. Extra-occular muscles are intact.    Cardiovascular: RRR   Respiratory: Nonlabored, diminished  GI: Abdomen nondistended, soft, and nontender. Normal active bowel sounds. Musculoskeletal: No pitting edema of the lower legs. Able to move all ext  Neurological:  Patient is alert and oriented. Cranial nerves II-XII grossly intact  Psychiatric: flat affect      Data Review    Recent Results (from the past 24 hour(s))   GLUCOSE, POC    Collection Time: 01/25/21 11:23 AM   Result Value Ref Range    Glucose (POC) 123 (H) 65 - 100 mg/dL    Performed by SIL ARRIAZA    CBC WITH AUTOMATED DIFF    Collection Time: 01/25/21 12:38 PM   Result Value Ref Range    WBC 10.0 4.1 - 11.1 K/uL    RBC 3.71 (L) 4.10 - 5.70 M/uL    HGB 9.7 (L) 12.1 - 17.0 g/dL    HCT 29.4 (L) 36.6 - 50.3 %    MCV 79.2 (L) 80.0 - 99.0 FL    MCH 26.1 26.0 - 34.0 PG    MCHC 33.0 30.0 - 36.5 g/dL    RDW 17.1 (H) 11.5 - 14.5 %    PLATELET 430 800 - 719 K/uL    MPV 10.0 8.9 - 12.9 FL    NEUTROPHILS 88 (H) 32 - 75 %    LYMPHOCYTES 8 (L) 12 - 49 %    MONOCYTES 1 (L) 5 - 13 %    EOSINOPHILS 1 0 - 7 %    BASOPHILS 0 0 - 1 %    IMMATURE GRANULOCYTES 2 (H) 0.0 - 0.5 %    ABS. NEUTROPHILS 9.0 (H) 1.8 - 8.0 K/UL    ABS. LYMPHOCYTES 0.8 0.8 - 3.5 K/UL    ABS. MONOCYTES 0.1 0.0 - 1.0 K/UL    ABS. EOSINOPHILS 0.1 0.0 - 0.4 K/UL    ABS. BASOPHILS 0.0 0.0 - 0.1 K/UL    ABS. IMM. GRANS. 0.2 (H) 0.00 - 0.04 K/UL    DF AUTOMATED     METABOLIC PANEL, COMPREHENSIVE    Collection Time: 01/25/21 12:38 PM   Result Value Ref Range    Sodium 138 136 - 145 mmol/L    Potassium 6.1 (H) 3.5 - 5.1 mmol/L    Chloride 109 (H) 97 - 108 mmol/L    CO2 19 (L) 21 - 32 mmol/L    Anion gap 10 5 - 15 mmol/L    Glucose 94 65 - 100 mg/dL     (H) 6 - 20 mg/dL    Creatinine 3.77 (H) 0.70 - 1.30 mg/dL    BUN/Creatinine ratio 29 (H) 12 - 20      GFR est AA 20 (L) >60 ml/min/1.73m2    GFR est non-AA 16 (L) >60 ml/min/1.73m2    Calcium 9.2 8.5 - 10.1 mg/dL    Bilirubin, total 0.2 0.2 - 1.0 mg/dL    AST (SGOT) 17 15 - 37 U/L    ALT (SGPT) 21 12 - 78 U/L    Alk. phosphatase 63 45 - 117 U/L    Protein, total 6.8 6.4 - 8.2 g/dL    Albumin 2.7 (L) 3.5 - 5.0 g/dL    Globulin 4.1 (H) 2.0 - 4.0 g/dL    A-G Ratio 0.7 (L) 1.1 - 2.2     GLUCOSE, POC    Collection Time: 01/25/21  3:26 PM   Result Value Ref Range    Glucose (POC) 200 (H) 65 - 100 mg/dL    Performed by Reyna Ku    GLUCOSE, POC    Collection Time: 01/25/21  9:43 PM   Result Value Ref Range    Glucose (POC) 102 (H) 65 - 100 mg/dL    Performed by RERE GONZALEZ    GLUCOSE, POC    Collection Time: 01/26/21  7:47 AM   Result Value Ref Range    Glucose (POC) 114 (H) 65 - 100 mg/dL    Performed by [facility]        Radiology review: Chest xray      Assessment/Plan:    1. Bilateral pneumonia concerning for COVID-19  Oxygen saturation within normal limits. Augmentin and azithromycin  Consult pulmonology. Per pulmonology due to renal function patient is not a candidate for remdesivir  Ivermectin per pulmonary  Prednisone  Pulse oximetry exercise test Exercise was 83% on RA, will need home O2, repeat normal    2. Type 2 diabetes  Blood glucose level stable. Continue with insulin sliding scale. 3.  Hypertension  BP stable   On norvasc, atenolol, and hydralzine. 4. CKD stage IV  We will continue to monitor. BUN and Cr stable  Hyperkalemia, renal consult, Bicarb, kayalexate and lasix  Nephrology consulted    5. Hypothyroidism  On synthroid. TSH within normal limits    6. CBC and BMP in the AM   7. PT/OT     8. Hypothermia  Work up pending:  Blood culutres remain negative  UA pending  CXR normal  TSH normal  Suspect related to Covid    9. Tachycardia  Resolved  On atenolol  VQ scan normal  Improved    CODE STATUS Full      DVT prophylaxis: Heparin  Ulcer prophylaxis: Protonix    Care Plan discussed with: Patient/Family and Nurse     [phone] Nanci Wong, expressing concerns for [place] and/or rehab    Discussed case with nanci.  Plan for discharge pending SNF versus home health    Total time spent with patient: 34 minutes.

## 2021-01-26 NOTE — PROGRESS NOTES
Problem: Falls - Risk of  Goal: *Absence of Falls  Description: Document Phil Eugenia Fall Risk and appropriate interventions in the flowsheet. 1/26/2021 1536 by Emily Quezada RN  Outcome: Resolved/Not Met  1/26/2021 1237 by Emily Quezada RN  Outcome: Progressing Towards Goal  Note: Fall Risk Interventions:  Mobility Interventions: Bed/chair exit alarm    Mentation Interventions: Bed/chair exit alarm    Medication Interventions: Bed/chair exit alarm    Elimination Interventions: Bed/chair exit alarm              Problem: Patient Education: Go to Patient Education Activity  Goal: Patient/Family Education  1/26/2021 1536 by Emily Quezada RN  Outcome: Resolved/Not Met  1/26/2021 1237 by Emily Quezada RN  Outcome: Progressing Towards Goal     Problem: Pressure Injury - Risk of  Goal: *Prevention of pressure injury  Description: Document Alberto Scale and appropriate interventions in the flowsheet.   Outcome: Resolved/Not Met     Problem: Patient Education: Go to Patient Education Activity  Goal: Patient/Family Education  Outcome: Resolved/Not Met     Problem: Patient Education: Go to Patient Education Activity  Goal: Patient/Family Education  Outcome: Resolved/Not Met     Problem: Patient Education: Go to Patient Education Activity  Goal: Patient/Family Education  Outcome: Resolved/Not Met

## 2021-01-29 LAB
BACTERIA SPEC CULT: NORMAL
SPECIAL REQUESTS,SREQ: NORMAL

## 2021-07-06 ENCOUNTER — HOSPITAL ENCOUNTER (OUTPATIENT)
Dept: WOUND CARE | Age: 66
Discharge: HOME | End: 2021-07-06
Admitting: SPECIALIST
Payer: MEDICARE

## 2021-07-06 VITALS
WEIGHT: 255 LBS | DIASTOLIC BLOOD PRESSURE: 76 MMHG | HEIGHT: 67 IN | TEMPERATURE: 98.2 F | RESPIRATION RATE: 18 BRPM | HEART RATE: 117 BPM | BODY MASS INDEX: 40.02 KG/M2 | SYSTOLIC BLOOD PRESSURE: 141 MMHG

## 2021-07-06 PROBLEM — L97.919 IDIOPATHIC CHRONIC VENOUS HYPERTENSION OF RIGHT LEG WITH ULCER (HCC): Status: ACTIVE | Noted: 2021-07-06

## 2021-07-06 PROBLEM — L97.929 IDIOPATHIC CHRONIC VENOUS HYPERTENSION OF LEFT LEG WITH ULCER (HCC): Status: ACTIVE | Noted: 2021-07-06

## 2021-07-06 PROBLEM — R60.0 LOWER EXTREMITY EDEMA: Status: ACTIVE | Noted: 2021-07-06

## 2021-07-06 PROBLEM — I87.311 IDIOPATHIC CHRONIC VENOUS HYPERTENSION OF RIGHT LEG WITH ULCER (HCC): Status: ACTIVE | Noted: 2021-07-06

## 2021-07-06 PROBLEM — N19 KIDNEY FAILURE: Status: ACTIVE | Noted: 2021-07-06

## 2021-07-06 PROBLEM — I87.312 IDIOPATHIC CHRONIC VENOUS HYPERTENSION OF LEFT LEG WITH ULCER (HCC): Status: ACTIVE | Noted: 2021-07-06

## 2021-07-06 PROCEDURE — 99213 OFFICE O/P EST LOW 20 MIN: CPT

## 2021-07-06 PROCEDURE — 11042 DBRDMT SUBQ TIS 1ST 20SQCM/<: CPT

## 2021-07-06 RX ORDER — LANOLIN ALCOHOL/MO/W.PET/CERES
CREAM (GRAM) TOPICAL
COMMUNITY

## 2021-07-06 RX ORDER — SODIUM BICARBONATE 650 MG/1
650 TABLET ORAL 2 TIMES DAILY
COMMUNITY

## 2021-07-06 RX ORDER — FUROSEMIDE 40 MG/1
40 TABLET ORAL DAILY
COMMUNITY

## 2021-07-06 NOTE — WOUND CARE
[facility] [address] f: [phone] f: [phone] p: [phone] [email or web address]     Ordering Center:     [facility] OP WOUND CARE  [address] [facility]  [address] [id number] [phone]  WOUND CARE [unfilled]   FAX NUMBER [unfilled]    Patient Information:      Mary Lapidus   [facility] [address] [facility] [address]   [unfilled]   : 1955  AGE: 74 y.o. GENDER: male   TODAYS DATE:  2021    Insurance:      PRIMARY INSURANCE:  [id number] - (Medicare)    Payor/Plan Subscr  Sex Relation Sub. Ins. ID Effective Group Num   1. [address] 1955 Male Self [id number]                                   [address] [address]   2. [address] 64 D 1955 Male Self [id number]                                   [address] [address] -       Patient Wound Information:      Problem List Items Addressed This Visit     None          WOUNDS REQUIRING DRESSING SUPPLIES:     Wound Leg Left #1 (Active)   Wound Image   21 1032   Wound Etiology Venous 21 1032   Dressing Status Other (Comment) 21 1032   Cleansed Cleansed with saline 21 1032   Wound Length (cm) 3 cm 21 1032   Wound Width (cm) 1.5 cm 21 1032   Wound Depth (cm) 0.1 cm 21 1032   Wound Surface Area (cm^2) 4.5 cm^2 21 1032   Wound Volume (cm^3) 0.45 cm^3 21 1032   Post-Procedure Length (cm) 3 cm 21 1032   Post-Procedure Width (cm) 1.5 cm 21 1032   Post-Procedure Depth (cm) 0.1 cm 21 1032   Post-Procedure Surface Area (cm^2) 4.5 cm^2 21 1032   Post-Procedure Volume (cm^3) 0.45 cm^3 21 1032   Wound Assessment Eschar dry 21 1032   Drainage Amount None 21 103   Wound Odor None 21 103   Mary Grace-Wound/Incision Assessment Intact; Hemosiderin staining (brown yellow); Hyperpigmented 21   Edges Attached edges 07/06/21 1032   Number of days: 0       Wound Leg lower Right #2 (Active)   Wound Image   07/06/21 1032   Wound Etiology Venous 07/06/21 1032   Cleansed Cleansed with saline 07/06/21 1032   Wound Length (cm) 1.2 cm 07/06/21 1032   Wound Width (cm) 1 cm 07/06/21 1032   Wound Depth (cm) 0.1 cm 07/06/21 1032   Wound Surface Area (cm^2) 1.2 cm^2 07/06/21 1032   Wound Volume (cm^3) 0.12 cm^3 07/06/21 1032   Post-Procedure Length (cm) 1.2 cm 07/06/21 1032   Post-Procedure Width (cm) 1 cm 07/06/21 1032   Post-Procedure Depth (cm) 0.1 cm 07/06/21 1032   Post-Procedure Surface Area (cm^2) 1.2 cm^2 07/06/21 1032   Post-Procedure Volume (cm^3) 0.12 cm^3 07/06/21 1032   Wound Assessment Granulation tissue; Subcutaneous 07/06/21 1032   Drainage Amount Small 07/06/21 1032   Drainage Description Clear 07/06/21 1032   Wound Odor None 07/06/21 1032   Mary Grace-Wound/Incision Assessment Intact 07/06/21 1032   Edges Attached edges 07/06/21 1032   Wound Thickness Description Partial thickness 07/06/21 1032   Number of days: 0        Supplies Requested :      WOUND #:1   PRIMARY DRESSING:  None   4X4 gauze pad, Conforming roll gauze and Other tubi  size G     FREQUENCY OF DRESSING CHANGES:  Every other day       WOUND #: 2   PRIMARY DRESSING:  None   4X4 gauze pad, Conforming roll gauze and Other tubi size G     FREQUENCY OF DRESSING CHANGES:  Every other day                     ADDITIONAL ITEMS:  [] Gloves Small  [] Gloves Medium [x] Gloves Large [] Gloves XLarge  [] Tape 1\" [] Tape 2\" [] Tape 3\"  [x] Medipore Tape  [x] Saline  [] Skin Prep   [] Adhesive Remover   [] Cotton Tip Applicators   [] Other:    Patient Wound(s) Debrided: [x] Yes if yes please add date07/06/2021       Debribement Type: Excisional/Sharp    Patient currently being seen by Home Health: [] Yes   [x] No    Duration for needed supplies:  []15  [x]30  []60  []90 Days    Electronically signed by Nicolas Alejo LPN on 2/1/8535 at 27:52 AM    Provider Information:      PROVIDER'S NAME: Shorty Kilpatrick MD

## 2021-07-06 NOTE — DISCHARGE INSTRUCTIONS
Discharge Instructions for  [address] [place] [address], [facility]  Telephone: [phone]     NAME:  Vaibhav Oliveira:  1955  MEDICAL RECORD NUMBER:  [id number]  DATE:  7/6/2021        Return Appointment:  [x]? Dressing supply provider: [facility] [employer]  []? Home Healthcare:  [x]? Return Appointment: 1 Week(s)  []? Nurse Visit:   Week(s)     []? Discharge from [facility]  [x]? Ordered tests: PVR AND VENOUS REFLUX TO BE DONE AT [facility]  []? Referrals:   []? Rx:      Wound Cleansing:   Do not scrub or use excessive force. Cleanse wound prior to applying a clean dressing with:  [x]? Normal Saline          [x]? Mild Soap & Water    []? Keep Wound Dry in Shower  []? Other:       Topical Treatments:  Do not apply lotions, creams, or ointments to wound bed unless directed. []? Apply moisturizing lotion to skin surrounding the wound prior to dressing change. []? Apply antifungal ointment to skin surrounding the wound prior to dressing change. []? Apply thin film of moisture barrier ointment to skin immediately around wound. []? Other:                  Dressings:                  Wound Location RIGHT AND LEFT LEGS              [x]? Apply Primary Dressing:                                          []? MediHoney Gel         [x]? MediHoney Alginate                     []? Calcium Alginate      []? Calcium Alginate with Silver              []? Collagen                   []? Collagen with Silver                []? Santyl with Moistened saline gauze              []? Hydrofera Blue (cut to size and moistened)  []? Hydrofera Blue Ready (Cut to size)              []? NS wet to dry            []? Betadine wet to dry              []? Hydrogel                   []? Mepitel                   []? Bactroban/Mupirocin                       []? Other:      [x]? Cover and Secure with:                   [x]? Gauze        [x]? Zohaibria Ngozi           []?  Kerlix []? Foam          []? Super Absorbant    []? ABD                                      []? ACE Wrap            []? Other:               Avoid contact of tape with skin.     [x]? Change dressing:  []? Daily           [x]? Every Other Day    []? Once per week   []? Twice per week              []? Three times per week        []? Do Not Change Dressing    []? Other:                Negative Pressure:           Wound Location:   []? Pressure @  mm/Hg                      []?Continuous  []? Intermittent              []? Black          []? White Foam              []? Bridge:               []? Change vac dressing three times per week     Pressure Relief:  []? When sitting, shift position or do seat lifts every 15 minutes. []? Wheelchair cushion           []? Specialty Bed/Mattress  []? Turn every 2 hours when in bed. Avoid direct pressure on wound site. Limit side lying to 30 degree tilt. Limit HOB elevation to 30 degrees. Edema Control:  Apply:  []? Compression Stocking      []? Right Leg     []? Left Leg              [x]? Tubigrip      [x]? Right Leg Double Layer      [x]? Left Leg Double Layer                                      []?Right Leg Single Layer       []? Left Leg Single Layer                 [x]? Elevate leg(s) above the level of the heart when sitting. [x]? Avoid prolonged standing in one place.         Compression:  Apply:  []? Multilayer Compression Wrap:      []? RightLeg      []? Left Leg                                 []?Profore            []? Profore Lite             []?Unna                 []? Do not get leg(s) with wrap wet. If wraps become too tight call the center or completely remove the wrap. []? Elevate leg(s) above the level of the heart when sitting. []? Avoid prolonged standing in one place.     Off-Loading:   []? Off-loading when   []? walking       []? in bed         []? sitting  []? Total non-weight bearing  []?  Right Leg []? Left Leg         []? Assistive Device    []? [address]        []? Cane      []? Wheelchair      []? Crutches              []? Surgical shoe    []? Podus Boot(s)   []? Foam Boot(s)  []? Roll About              []? Cast Boot   []? CROW Boot  []? Wedge Shoe  []? Other:     Contact Cast:  Apply:  []? Total Contact Cast Applied in Clinic          []? RightLeg      []? Left Leg              []? Do not get cast wet. Contact center or go to emergency room if there is a foul odor or becomes uncomfortable due to feeling tight or swelling. Do not use objects inside of cast to scratch.                  Dietary:  []? Diet as tolerated:   [x]? Calorie Diabetic Diet:         []? No Added Salt:  []? Increase Protein:   []? Other:              Activity:  [x]? Activity as tolerated:    []? Patient has no activity restrictions       []? Strict Bedrest:          []? Remain off Work:       []? May return to full duty work:                                               []? Return to work with restrictions:      [address] Information: Should you experience any significant changes in your wound(s) or have questions about your wound care, please contact Nadira Bundy 26 at [facility] 8:00 am - 4:30. If you need help with your wound outside of these hours and cannot wait until we are again available, contact your PCP or go to the hospital emergency room.      PLEASE NOTE: IF [facility], CONTINUE TO USE THE SUPPLIES YOU HAVE AVAILABLE UNTIL YOU ARE ABLE TO [facility]. IT IS MOST IMPORTANT TO KEEP THE WOUND COVERED AT ALL TIMES.     [x]? Dr. Diamond Harris   []? Dr. Jameel Hernandez  []? Dr. Bryant Rodriguez  []?  Dr. Irvin Augustin

## 2021-07-06 NOTE — WOUND CARE
[address]   Progress Note and Procedure Note     Christiana Red  MEDICAL RECORD NUMBER:  [id number]  AGE: 74 y.o. RACE BLACK/  GENDER: male  : 1955  EPISODE DATE:  2021    Subjective:   75-year-old gentleman with renal failure and possibly recently diagnosed with diabetes was last seen in this clinic in 2019 for venous stasis ulcers of lower extremities. Patient does not wear compression stockings. He presents now with a 1 month history of bilateral small anterior lower extremity wounds which are traumatic in etiology, in a background of chronic venous stasis disease. Chief Complaint   Patient presents with    Wound Check     bilateral legs         HISTORY of PRESENT ILLNESS HPI    Christiana Red is a 74 y.o. male who presents today for wound/ulcer evaluation.    History of Wound Context: BLE  Wound/Ulcer Pain Timing/Severity: none  Quality of pain: dull  Severity:  0 / 10   Modifying Factors: None  Associated Signs/Symptoms: edema    Ulcer Identification:  Ulcer Type: venous and traumatic    Contributing Factors: edema    Wound: BLE         PAST MEDICAL HISTORY    Past Medical History:   Diagnosis Date    CKD (chronic kidney disease) stage 4, GFR 15-29 ml/min ([facility])     [facility]di[facility] mellitus[facility] [address]     H/O cervical spine surgery     HTN (hypertension)         PAST SURGICAL HISTORY    Past Surgical History:   Procedure Laterality Date    HX ORTHOPAEDIC      Spine Surgery       FAMILY HISTORY    Family History   Problem Relation Age of Onset    Coronary Artery Disease Mother     Heart Failure Mother     Pacemaker Sister        SOCIAL HISTORY    Social History     Tobacco Use    Smoking status: Never Smoker    Smokeless tobacco: Never Used   Substance Use Topics    Alcohol use: Not Currently    Drug use: Not Currently       ALLERGIES    No Known Allergies    MEDICATIONS    Current Outpatient Medications on File Prior to Encounter   Medication Sig Dispense Refill    sodium bicarbonate 650 mg tablet Take 650 mg by mouth two (2) times a day.  ferrous sulfate 325 mg (65 mg iron) tablet Take  by mouth Daily (before breakfast).  furosemide (Lasix) 40 mg tablet Take 40 mg by mouth daily.  atenoloL (TENORMIN) 100 mg tablet Take 50 mg by mouth daily.  hydrALAZINE (APRESOLINE) 50 mg tablet Take 100 mg by mouth three (3) times daily.  famotidine (Pepcid AC) 20 mg tablet Take 20 mg by mouth two (2) times a day.  levothyroxine (SYNTHROID) 50 mcg tablet Take 50 mcg by mouth Daily (before breakfast).  calcitRIOL (ROCALTROL) 0.25 mcg capsule Take 0.25 mcg by mouth daily.  allopurinoL (ZYLOPRIM) 100 mg tablet Take 100 mg by mouth daily. 1.5 tab      amLODIPine (Norvasc) 10 mg tablet Take 10 mg by mouth daily. No current facility-administered medications on file prior to encounter. REVIEW OF SYSTEMS    Pertinent items are noted in HPI. Objective:     Visit Vitals  BP (!) 141/76 (BP 1 Location: Left upper arm, BP Patient Position: At rest;Sitting)   Pulse (!) 117   Temp 98.2 °F (36.8 °C)   Resp 18   Ht 5' 7\" (1.702 m)   Wt 115.7 kg (255 lb)   BMI 39.94 kg/m²       Wt Readings from Last 3 Encounters:   07/06/21 115.7 kg (255 lb)   01/15/21 114.3 kg (252 lb)       PHYSICAL EXAM  Superficial wound on the right anterior leg, small, no evidence of infection  Small wound on the left anterior leg with clot, no active bleeding, no evidence of infection  Pertinent items are noted in HPI.     Assessment:   Lower extremity venous disease with traumatic wounds to the legs bilaterally  Problem List Items Addressed This Visit     None          POP IN PROCEDURE TYPE (DEBRIDEMENT, BIOPSY, I&D, SKIN SUB, CHEMICAL CAUERTY)     Plan:   Medihoney alginate  Compression  Noninvasive lower extremity arterial and venous studies  Leg elevation    Treatment Note please see attached Discharge Instructions    Written patient dismissal instructions given to patient or POA. Electronically signed by Kendra Mendoza MD on 7/6/2021 at 11:12 AM              Debridement Wound Care        Problem List Items Addressed This Visit     None          Procedure Note  Indications:  Based on my examination of this patient's wound(s)/ulcer(s) today, debridement is required to promote healing and evaluate the wound base. Performed by: Kendra Mendoza MD    Consent obtained: Yes    Time out taken: Yes    Debridement: Excisional    Using curette the wound(s)/ulcer(s) was/were sharply debrided down through and including the removal of    epidermis and dermis    Devitalized Tissue Debrided: slough    Pre Debridement Measurements:  Are located in the [place]  Documentation Flow Sheet    Non-Pressure ulcer, fat layer exposed    Wound/Ulcer #: 1 and 2    Post Debridement Measurements:  Wound/Ulcer Descriptions are Pre Debridement except measurements:    Wound Leg Left #1 (Active)   Wound Image   07/06/21 1032   Wound Etiology Venous 07/06/21 1032   Dressing Status Other (Comment) 07/06/21 1032   Cleansed Cleansed with saline 07/06/21 1032   Wound Length (cm) 3 cm 07/06/21 1032   Wound Width (cm) 1.5 cm 07/06/21 1032   Wound Depth (cm) 0.1 cm 07/06/21 1032   Wound Surface Area (cm^2) 4.5 cm^2 07/06/21 1032   Wound Volume (cm^3) 0.45 cm^3 07/06/21 1032   Post-Procedure Length (cm) 3 cm 07/06/21 1032   Post-Procedure Width (cm) 1.5 cm 07/06/21 1032   Post-Procedure Depth (cm) 0.1 cm 07/06/21 1032   Post-Procedure Surface Area (cm^2) 4.5 cm^2 07/06/21 1032   Post-Procedure Volume (cm^3) 0.45 cm^3 07/06/21 1032   Wound Assessment Eschar dry 07/06/21 1032   Drainage Amount None 07/06/21 1032   Wound Odor None 07/06/21 1032   Mary Grace-Wound/Incision Assessment Intact; Hemosiderin staining (brown yellow); Hyperpigmented 07/06/21 1032   Edges Attached edges 07/06/21 1032   Number of days: 0       Wound Leg lower Right #2 (Active)   Wound Image   07/06/21 1032   Wound Etiology Venous 07/06/21 1032   Cleansed Cleansed with saline 07/06/21 1032   Wound Length (cm) 1.2 cm 07/06/21 1032   Wound Width (cm) 1 cm 07/06/21 1032   Wound Depth (cm) 0.1 cm 07/06/21 1032   Wound Surface Area (cm^2) 1.2 cm^2 07/06/21 1032   Wound Volume (cm^3) 0.12 cm^3 07/06/21 1032   Post-Procedure Length (cm) 1.2 cm 07/06/21 1032   Post-Procedure Width (cm) 1 cm 07/06/21 1032   Post-Procedure Depth (cm) 0.1 cm 07/06/21 1032   Post-Procedure Surface Area (cm^2) 1.2 cm^2 07/06/21 1032   Post-Procedure Volume (cm^3) 0.12 cm^3 07/06/21 1032   Wound Assessment Granulation tissue; Subcutaneous 07/06/21 1032   Drainage Amount Small 07/06/21 1032   Drainage Description Clear 07/06/21 1032   Wound Odor None 07/06/21 1032   Mary Grace-Wound/Incision Assessment Intact 07/06/21 1032   Edges Attached edges 07/06/21 1032   Wound Thickness Description Partial thickness 07/06/21 1032   Number of days: 0        Total Surface Area Debrided:  5 sq cm     Estimated Blood Loss:  Minimal     Hemostasis Achieved: Pressure    Procedural Pain: 1 / 10     Post Procedural Pain: 0 / 10     Response to treatment: Well tolerated by patient

## 2021-07-06 NOTE — WOUND CARE
Discharge Instructions for  [address] [place] [address], [address] [place] [address]  Telephone: [phone]    NAME:  Verena Cabot OF BIRTH:  1955  MEDICAL RECORD NUMBER:  [id number]  DATE:  7/6/2021      Return Appointment:  [x] Dressing supply provider: [facility]  [] Home Healthcare:  [x] Return Appointment: 1 Week(s)  [] Nurse Visit:   Week(s)    [] Discharge from [facility]  [x] Ordered tests: PVR AND VENOUS REFLUX TO BE DONE AT [facility]  [] Referrals:   [] Rx:     Wound Cleansing:   Do not scrub or use excessive force. Cleanse wound prior to applying a clean dressing with:  [x] Normal Saline   [x] Mild Soap & Water    [] Keep Wound Dry in Shower  [] Other:      Topical Treatments:  Do not apply lotions, creams, or ointments to wound bed unless directed. [] Apply moisturizing lotion to skin surrounding the wound prior to dressing change.  [] Apply antifungal ointment to skin surrounding the wound prior to dressing change.  [] Apply thin film of moisture barrier ointment to skin immediately around wound. [] Other:       Dressings:           Wound Location RIGHT AND LEFT LEGS   [x] Apply Primary Dressing:       [] MediHoney Gel    [x] MediHoney Alginate               [] Calcium Alginate      [] Calcium Alginate with Silver   [] Collagen                   [] Collagen with Silver                [] Santyl with Moistened saline gauze              [] Hydrofera Blue (cut to size and moistened)  [] Hydrofera Blue Ready (Cut to size)   [] NS wet to dry    [] Betadine wet to dry              [] Hydrogel                [] Mepitel     [] Bactroban/Mupirocin               [] Other:     [x] Cover and Secure with:     [x] Gauze [x] Jamari [] Kerlix   [] Foam [] Super Absorbant [] ABD     [] ACE Wrap            [] Other:    Avoid contact of tape with skin.     [x] Change dressing: [] Daily    [x] Every Other Day [] Once per week   [] Twice per week   [] Three times per week [] Do Not Change Dressing   [] Other:     Negative Pressure:           Wound Location:   [] Pressure @  mm/Hg  []Continuous []Intermittent   [] Black  [] White Foam              [] Bridge:               [] Change vac dressing three times per week    Pressure Relief:  [] When sitting, shift position or do seat lifts every 15 minutes.  [] Wheelchair cushion [] Specialty Bed/Mattress  [] Turn every 2 hours when in bed. Avoid direct pressure on wound site. Limit side lying to 30 degree tilt. Limit HOB elevation to 30 degrees. Edema Control:  Apply: [] Compression Stocking []Right Leg []Left Leg   [x] Tubigrip [x]Right Leg Double Layer [x]Left Leg Double Layer      []Right Leg Single Layer []Left Leg Single Layer     [x] Elevate leg(s) above the level of the heart when sitting. [x] Avoid prolonged standing in one place. Compression:  Apply: [] Multilayer Compression Wrap: []RightLeg []Left Leg                                 []Profore  []Profore Lite             []Unna     [] Do not get leg(s) with wrap wet. If wraps become too tight call the center or completely remove the wrap. [] Elevate leg(s) above the level of the heart when sitting. [] Avoid prolonged standing in one place. Off-Loading:   [] Off-loading when [] walking  [] in bed [] sitting  [] Total non-weight bearing  [] Right Leg  [] Left Leg   [] Assistive Device [] Walker [] Cane      [] Wheelchair  [] Crutches   [] Surgical shoe    [] Podus Boot(s)   [] Foam Boot(s)  [] Roll About    [] Cast Boot [] CROW Boot  [] Wedge Shoe  [] Other:    Contact Cast:  Apply: [] Total Contact Cast Applied in Clinic []RightLeg []Left Leg   [] Do not get cast wet. Contact center or go to emergency room if there is a foul odor or becomes uncomfortable due to feeling tight or swelling. Do not use objects inside of cast to scratch.       Dietary:  [] Diet as tolerated: [x] Calorie Diabetic Diet: [] No Added Salt:  [] Increase Protein: [] Other:     Activity:  [x] Activity as tolerated:    [] Patient has no activity restrictions       [] Strict Bedrest:   [] Remain off Work:       [] May return to full duty work:                                     [] Return to work with restrictions:                    [address] Information: Should you experience any significant changes in your wound(s) or have questions about your wound care, please contact Nadira Bundy 26 at [place] [phone] 8:00 am - 4:30. If you need help with your wound outside of these hours and cannot wait until we are again available, contact your PCP or go to the hospital emergency room. PLEASE NOTE: IF YOU ARE UNABLE TO OBTAIN WOUND SUPPLIES, CONTINUE TO USE THE SUPPLIES YOU HAVE AVAILABLE UNTIL YOU ARE ABLE TO REACH US. IT IS MOST IMPORTANT TO KEEP THE WOUND COVERED AT ALL TIMES.     [x] Dr. Lesley Shafer   [] Dr. Vinita Luciano  [] Dr. Kristi Dotson  [] Dr. Ramesh Quiroga

## 2021-07-13 ENCOUNTER — HOSPITAL ENCOUNTER (OUTPATIENT)
Dept: WOUND CARE | Age: 66
Discharge: HOME | End: 2021-07-13
Admitting: SPECIALIST
Payer: MEDICARE

## 2021-07-13 VITALS — HEART RATE: 57 BPM | TEMPERATURE: 97.6 F | RESPIRATION RATE: 20 BRPM

## 2021-07-13 PROCEDURE — 11042 DBRDMT SUBQ TIS 1ST 20SQCM/<: CPT

## 2021-07-13 NOTE — WOUND CARE
[address]   Progress Note and Procedure Note     Jude Strickland  MEDICAL RECORD NUMBER:  [id number]  AGE: 74 y.o. RACE BLACK/  GENDER: male  : 1955  EPISODE DATE:  2021    Subjective:   No new problems reported  Chief Complaint   Patient presents with    Wound Check     right and left lower legs         HISTORY of PRESENT ILLNESS HPI    Jude Strickland is a 74 y.o. male who presents today for wound/ulcer evaluation. History of Wound Context: R & L legs  Wound/Ulcer Pain Timing/Severity: mild  Quality of pain: dull  Severity:  1 / 10   Modifying Factors: None  Associated Signs/Symptoms: edema    Ulcer Identification:  Ulcer Type: venous    Contributing Factors: obesity    Wound: R & L legs         PAST MEDICAL HISTORY    Past Medical History:   Diagnosis Date    CKD (chronic kidney disease) stage 4, GFR 15-29 ml/min [facility])     DM [facility]di[facility] mellitus) ([facility] [address]     H/O cervical spine surgery     HTN (hypertension)         PAST SURGICAL HISTORY    Past Surgical History:   Procedure Laterality Date    HX ORTHOPAEDIC      Spine Surgery       FAMILY HISTORY    Family History   Problem Relation Age of Onset    Coronary Artery Disease Mother     Heart Failure Mother     Pacemaker Sister        SOCIAL HISTORY    Social History     Tobacco Use    Smoking status: Never Smoker    Smokeless tobacco: Never Used   Substance Use Topics    Alcohol use: Not Currently    Drug use: Not Currently       ALLERGIES    No Known Allergies    MEDICATIONS    Current Outpatient Medications on File Prior to Encounter   Medication Sig Dispense Refill    sodium bicarbonate 650 mg tablet Take 650 mg by mouth two (2) times a day.  ferrous sulfate 325 mg (65 mg iron) tablet Take  by mouth Daily (before breakfast).  furosemide (Lasix) 40 mg tablet Take 40 mg by mouth daily.  atenoloL (TENORMIN) 100 mg tablet Take 50 mg by mouth daily.       hydrALAZINE (APRESOLINE) 50 mg tablet Take 100 mg by mouth three (3) times daily.  famotidine (Pepcid AC) 20 mg tablet Take 20 mg by mouth two (2) times a day.  levothyroxine (SYNTHROID) 50 mcg tablet Take 50 mcg by mouth Daily (before breakfast).  calcitRIOL (ROCALTROL) 0.25 mcg capsule Take 0.25 mcg by mouth daily.  allopurinoL (ZYLOPRIM) 100 mg tablet Take 100 mg by mouth daily. 1.5 tab      amLODIPine (Norvasc) 10 mg tablet Take 10 mg by mouth daily. No current facility-administered medications on file prior to encounter. REVIEW OF SYSTEMS    Pertinent items are noted in HPI. Objective:     Visit Vitals  Pulse (!) 57   Temp 97.6 °F (36.4 °C)   Resp 20       Wt Readings from Last 3 Encounters:   07/06/21 115.7 kg (255 lb)   01/15/21 114.3 kg (252 lb)       PHYSICAL EXAM  The right leg wound is small, superficial, mild slough debrided, no infection  Left anterior leg wound is small, slough debrided, no evidence of infection  Pertinent items are noted in HPI. Assessment:   Slowly improving bilateral leg wounds  Problem List Items Addressed This Visit     None          POP IN PROCEDURE TYPE (DEBRIDEMENT, BIOPSY, I&D, SKIN SUB, CHEMICAL CAUERTY)     Plan:   Continue Medihoney alginate, Tubigrip  compression, leg elevation  Appointment for vascular studies in 2 days    Treatment Note please see attached Discharge Instructions    Written patient dismissal instructions given to patient or POA. Electronically signed by Loulou Dalton MD on 7/13/2021 at 2:44 PM              Debridement Wound Care        Problem List Items Addressed This Visit     None          Procedure Note  Indications:  Based on my examination of this patient's wound(s)/ulcer(s) today, debridement is required to promote healing and evaluate the wound base.     Performed by: Loulou Dalton MD    Consent obtained: Yes    Time out taken: Yes    Debridement: Excisional    Using curette the wound(s)/ulcer(s) was/were sharply debrided down through and including the removal of    dermis and subcutaneous tissue    Devitalized Tissue Debrided: slough    Pre Debridement Measurements:  Are located in the Wound/Ulcer Documentation Flow Sheet    Non-Pressure ulcer, fat layer exposed    Wound/Ulcer #: 1 and 2    Post Debridement Measurements:  Wound/Ulcer Descriptions are Pre Debridement except measurements:    Wound Leg Left #1 (Active)   Wound Image   07/13/21 1413   Wound Etiology Venous 07/13/21 1413   Dressing Status Other (Comment) 07/13/21 1413   Cleansed Cleansed with saline 07/13/21 1413   Wound Length (cm) 3 cm 07/13/21 1413   Wound Width (cm) 1 cm 07/13/21 1413   Wound Depth (cm) 0.1 cm 07/13/21 1413   Wound Surface Area (cm^2) 3 cm^2 07/13/21 1413   Change in Wound Size % 33.33 07/13/21 1413   Wound Volume (cm^3) 0.3 cm^3 07/13/21 1413   Wound Healing % 33 07/13/21 1413   Post-Procedure Length (cm) 3 cm 07/13/21 1442   Post-Procedure Width (cm) 1 cm 07/13/21 1442   Post-Procedure Depth (cm) 0.1 cm 07/13/21 1442   Post-Procedure Surface Area (cm^2) 3 cm^2 07/13/21 1442   Post-Procedure Volume (cm^3) 0.3 cm^3 07/13/21 1442   Wound Assessment Slough;Granulation tissue 07/13/21 1413   Drainage Amount Moderate 07/13/21 1413   Drainage Description Serosanguinous 07/13/21 1413   Wound Odor None 07/06/21 1032   Mary Grace-Wound/Incision Assessment Intact 07/13/21 1413   Edges Attached edges 07/06/21 1032   Wound Thickness Description Full thickness 07/13/21 1413   Number of days: 7       Wound Leg lower Right #2 (Active)   Wound Image   07/13/21 1416   Wound Etiology Venous 07/13/21 1416   Dressing Status Other (Comment) 07/13/21 1416   Cleansed Cleansed with saline 07/13/21 1416   Wound Length (cm) 1 cm 07/13/21 1416   Wound Width (cm) 1 cm 07/13/21 1416   Wound Depth (cm) 0.1 cm 07/13/21 1416   Wound Surface Area (cm^2) 1 cm^2 07/13/21 1416   Change in Wound Size % 16.67 07/13/21 1416   Wound Volume (cm^3) 0.1 cm^3 07/13/21 1416   Wound Healing % 17 07/13/21 1416   Post-Procedure Length (cm) 1 cm 07/13/21 1416   Post-Procedure Width (cm) 1 cm 07/13/21 1416   Post-Procedure Depth (cm) 0.1 cm 07/13/21 1416   Post-Procedure Surface Area (cm^2) 1 cm^2 07/13/21 1416   Post-Procedure Volume (cm^3) 0.1 cm^3 07/13/21 1416   Wound Assessment Granulation tissue; Subcutaneous 07/13/21 1416   Drainage Amount Scant 07/13/21 1416   Drainage Description Serosanguinous 07/13/21 1416   Wound Odor None 07/13/21 1416   Mary Grace-Wound/Incision Assessment Intact 07/13/21 1416   Edges Attached edges 07/13/21 1416   Wound Thickness Description Partial thickness 07/13/21 1416   Number of days: 7        Total Surface Area Debrided:  4 sq cm     Estimated Blood Loss:  Minimal     Hemostasis Achieved: Pressure and Silver Nitrate    Procedural Pain: 0 / 10     Post Procedural Pain: 0 / 10     Response to treatment: Well tolerated by patient

## 2021-07-13 NOTE — DISCHARGE INSTRUCTIONS
Discharge Instructions for  [address] [place] [address]  [address], [facility]  Telephone: [phone]     NAME:  Vaibhav Hernandezuge:  1955  MEDICAL RECORD NUMBER:  [id number]  DATE:  7/13/2021        Return Appointment:  [x]? Dressing supply provider: [facility] [employer]  []? Home Healthcare:  [x]? Return Appointment: 1 Week(s)  []? Nurse Visit:   Week(s)     []? Discharge from [facility]  [x]? Ordered tests: PVR AND VENOUS REFLUX TO BE DONE AT [facility] on 7/15/2021  []? Referrals:   []? Rx:      Wound Cleansing:   Do not scrub or use excessive force. Cleanse wound prior to applying a clean dressing with:  [x]? Normal Saline          [x]? Mild Soap & Water    []? Keep Wound Dry in Shower  []? Other:       Topical Treatments:  Do not apply lotions, creams, or ointments to wound bed unless directed. []? Apply moisturizing lotion to skin surrounding the wound prior to dressing change. []? Apply antifungal ointment to skin surrounding the wound prior to dressing change. []? Apply thin film of moisture barrier ointment to skin immediately around wound. []? Other:                  Dressings:                  Wound Location RIGHT AND LEFT LEGS              [x]? Apply Primary Dressing:                                          []? MediHoney Gel         [x]? MediHoney Alginate                     []? Calcium Alginate      []? Calcium Alginate with Silver              []? Collagen                   []? Collagen with Silver                []? Santyl with Moistened saline gauze              []? Hydrofera Blue (cut to size and moistened)  []? Hydrofera Blue Ready (Cut to size)              []? NS wet to dry            []? Betadine wet to dry              []? Hydrogel                   []? Mepitel                   []? Bactroban/Mupirocin                       []? Other:      [x]? Cover and Secure with:                   [x]? Gauze        [x]?  Yony Choudhury []? Kerlix              []? Foam          []? Super Absorbant    []? ABD                                      []? ACE Wrap            []? Other:               Avoid contact of tape with skin.     [x]? Change dressing:  []? Daily           [x]? Every Other Day    []? Once per week   []? Twice per week              []? Three times per week        []? Do Not Change Dressing    []? Other:                Negative Pressure:           Wound Location:   []? Pressure @  mm/Hg                      []?Continuous  []? Intermittent              []? Black          []? White Foam              []? Bridge:               []? Change vac dressing three times per week     Pressure Relief:  []? When sitting, shift position or do seat lifts every 15 minutes. []? Wheelchair cushion           []? Specialty Bed/Mattress  []? Turn every 2 hours when in bed. Avoid direct pressure on wound site. Limit side lying to 30 degree tilt. Limit HOB elevation to 30 degrees. Edema Control:  Apply:  []? Compression Stocking      []? Right Leg     []? Left Leg              [x]? Tubigrip      [x]? Right Leg Double Layer      [x]? Left Leg Double Layer                                      []?Right Leg Single Layer       []? Left Leg Single Layer                 [x]? Elevate leg(s) above the level of the heart when sitting. [x]? Avoid prolonged standing in one place.         Compression:  Apply:  []? Multilayer Compression Wrap:      []? RightLeg      []? Left Leg                                 []?Profore            []? Profore Lite             []?Unna                 []? Do not get leg(s) with wrap wet. If wraps become too tight call the center or completely remove the wrap. []? Elevate leg(s) above the level of the heart when sitting. []? Avoid prolonged standing in one place.     Off-Loading:   []? Off-loading when   []? walking       []? in bed         []? sitting  []?  Total non-weight bearing []? Right Leg  []? Left Leg         []? Assistive Device    []? Elizabeth Ema        []? Cane      []? Wheelchair      []? Crutches              []? Surgical shoe    []? Podus Boot(s)   []? Foam Boot(s)  []? Roll About              []? Cast Boot   []? CROW Boot  []? Wedge Shoe  []? Other:     Contact Cast:  Apply:  []? Total Contact Cast Applied in Clinic          []? RightLeg      []? Left Leg              []? Do not get cast wet. Contact center or go to emergency room if there is a foul odor or becomes uncomfortable due to feeling tight or swelling. Do not use objects inside of cast to scratch.                  Dietary:  []? Diet as tolerated:   [x]? Calorie Diabetic Diet:         []? No Added Salt:  []? Increase Protein:   []? Other:              Activity:  [x]? Activity as tolerated:    []? Patient has no activity restrictions       []? Strict Bedrest:          []? Remain off Work:       []? May return to full duty work:                                               []? Return to work with restrictions:      [address] [employer] [address] Information: Should you experience any significant changes in your wound(s) or have questions about your wound care, please contact Nadira Bundy 26 at [place] [address] 8:00 am - 4:30. If you need help with your wound outside of these hours and cannot wait until we are again available, contact your PCP or go to the hospital emergency room.      PLEASE NOTE: IF YOU ARE UNABLE TO [place] [address], CONTINUE TO USE THE SUPPLIES YOU HAVE AVAILABLE UNTIL YOU ARE ABLE TO [facility]. IT IS MOST IMPORTANT TO KEEP THE WOUND COVERED AT ALL TIMES.     [x]? Dr. Kimberly Flor   []? Dr. Mahi Harris  []? Dr. Logan Chaudhary  []?  Dr. Carissa Snyder

## 2021-07-13 NOTE — WOUND CARE
Discharge Instructions for  [address] [place] [address], [address] [place] [address]  Telephone: [phone]    NAME:  Lisa Gonzalez OF BIRTH:  1955  MEDICAL RECORD NUMBER:  [id number]  DATE:  7/13/2021      Return Appointment:  [x] Dressing supply provider: [facility]  [] Home Healthcare:  [x] Return Appointment: 1 Week(s)  [] Nurse Visit:   Week(s)    [] Discharge from [facility]  [x] Ordered tests: PVR AND VENOUS REFLUX TO BE DONE AT [facility] on 7/15/2021  [] Referrals:   [] Rx:     Wound Cleansing:   Do not scrub or use excessive force. Cleanse wound prior to applying a clean dressing with:  [x] Normal Saline   [x] Mild Soap & Water    [] Keep Wound Dry in Shower  [] Other:      Topical Treatments:  Do not apply lotions, creams, or ointments to wound bed unless directed. [] Apply moisturizing lotion to skin surrounding the wound prior to dressing change.  [] Apply antifungal ointment to skin surrounding the wound prior to dressing change.  [] Apply thin film of moisture barrier ointment to skin immediately around wound. [] Other:       Dressings:           Wound Location RIGHT AND LEFT LEGS   [x] Apply Primary Dressing:       [] MediHoney Gel    [x] MediHoney Alginate               [] Calcium Alginate      [] Calcium Alginate with Silver   [] Collagen                   [] Collagen with Silver                [] Santyl with Moistened saline gauze              [] Hydrofera Blue (cut to size and moistened)  [] Hydrofera Blue Ready (Cut to size)   [] NS wet to dry    [] Betadine wet to dry              [] Hydrogel                [] Mepitel     [] Bactroban/Mupirocin               [] Other:     [x] Cover and Secure with:     [x] Gauze [x] Jamari [] Kerlix   [] Foam [] Super Absorbant [] ABD     [] ACE Wrap            [] Other:    Avoid contact of tape with skin.     [x] Change dressing: [] Daily    [x] Every Other Day [] Once per week   [] Twice per week   [] Three times per week [] Do Not Change Dressing   [] Other:     Negative Pressure:           Wound Location:   [] Pressure @  mm/Hg  []Continuous []Intermittent   [] Black  [] White Foam              [] Bridge:               [] Change vac dressing three times per week    Pressure Relief:  [] When sitting, shift position or do seat lifts every 15 minutes.  [] Wheelchair cushion [] Specialty Bed/Mattress  [] Turn every 2 hours when in bed. Avoid direct pressure on wound site. Limit side lying to 30 degree tilt. Limit HOB elevation to 30 degrees. Edema Control:  Apply: [] Compression Stocking []Right Leg []Left Leg   [x] Tubigrip [x]Right Leg Double Layer [x]Left Leg Double Layer      []Right Leg Single Layer []Left Leg Single Layer     [x] Elevate leg(s) above the level of the heart when sitting. [x] Avoid prolonged standing in one place. Compression:  Apply: [] Multilayer Compression Wrap: []RightLeg []Left Leg                                 []Profore  []Profore Lite             []Unna     [] Do not get leg(s) with wrap wet. If wraps become too tight call the center or completely remove the wrap. [] Elevate leg(s) above the level of the heart when sitting. [] Avoid prolonged standing in one place. Off-Loading:   [] Off-loading when [] walking  [] in bed [] sitting  [] Total non-weight bearing  [] Right Leg  [] Left Leg   [] Assistive Device [] Walker [] Cane      [] Wheelchair  [] Crutches   [] Surgical shoe    [] Podus Boot(s)   [] Foam Boot(s)  [] Roll About    [] Cast Boot [] CROW Boot  [] Wedge Shoe  [] Other:    Contact Cast:  Apply: [] Total Contact Cast Applied in Clinic []RightLeg []Left Leg   [] Do not get cast wet. Contact center or go to emergency room if there is a foul odor or becomes uncomfortable due to feeling tight or swelling. Do not use objects inside of cast to scratch.       Dietary:  [] Diet as tolerated: [x] Calorie Diabetic Diet: [] No Added Salt:  [] Increase Protein: [] Other:     Activity:  [x] Activity as tolerated:    [] Patient has no activity restrictions       [] Strict Bedrest:   [] Remain off Work:       [] May return to full duty work:                                     [] Return to work with restrictions:                    [address] Information: Should you experience any significant changes in your wound(s) or have questions about your wound care, please contact Nadira Dubon at [place] [address] 8:00 am - 4:30. If you need help with your wound outside of these hours and cannot wait until we are again available, contact your PCP or go to the hospital emergency room. PLEASE NOTE: IF YOU ARE UNABLE TO OBTAIN WOUND SUPPLIES, CONTINUE TO USE THE SUPPLIES YOU HAVE AVAILABLE UNTIL YOU ARE ABLE TO REACH US. IT IS MOST IMPORTANT TO KEEP THE WOUND COVERED AT ALL TIMES.     [x] Dr. Selin Altamirano   [] Dr. Ramonita Dotson  [] Dr. Leatha Prado  [] Dr. [facility] [employer]

## 2021-07-15 ENCOUNTER — HOSPITAL ENCOUNTER (OUTPATIENT)
Dept: NON INVASIVE DIAGNOSTICS | Age: 66
Discharge: HOME | End: 2021-07-15
Attending: SPECIALIST
Payer: MEDICARE

## 2021-07-15 LAB
LEFT CFV RFX: 2.4 S
Lab: 1.2 S
Lab: 1.7 S
RIGHT CFV RFX: 1.8 S

## 2021-07-15 PROCEDURE — 93970 EXTREMITY STUDY: CPT

## 2021-07-15 PROCEDURE — 93923 UPR/LXTR ART STDY 3+ LVLS: CPT

## 2021-07-20 ENCOUNTER — HOSPITAL ENCOUNTER (OUTPATIENT)
Dept: WOUND CARE | Age: 66
Discharge: HOME | End: 2021-07-20
Admitting: SPECIALIST
Payer: MEDICARE

## 2021-07-20 VITALS
SYSTOLIC BLOOD PRESSURE: 129 MMHG | RESPIRATION RATE: 18 BRPM | TEMPERATURE: 98.2 F | HEART RATE: 61 BPM | DIASTOLIC BLOOD PRESSURE: 65 MMHG

## 2021-07-20 PROCEDURE — 29581 APPL MULTLAYER CMPRN SYS LEG: CPT

## 2021-07-20 PROCEDURE — 74011000250 HC RX REV CODE- 250: Performed by: SPECIALIST

## 2021-07-20 RX ORDER — LIDOCAINE HYDROCHLORIDE 20 MG/ML
15 SOLUTION OROPHARYNGEAL AS NEEDED
Status: DISCONTINUED | OUTPATIENT
Start: 2021-07-20 | End: 2021-07-22 | Stop reason: HOSPADM

## 2021-07-20 NOTE — WOUND CARE
Discharge Instructions for  [address] [place] [address], [employer] [facility]  Telephone: [phone]    NAME:  James Castillo OF BIRTH:  1955  MEDICAL RECORD NUMBER:  [id number]  DATE:  7/20/2021      Return Appointment:  [x] Dressing supply provider: [facility]  [] Home Healthcare:  [x] Return Appointment: 1 Week(s)  [] Nurse Visit:   Week(s)    [] Discharge from [facility]  [] Ordered tests:    [x] Referrals: Dr. Aviva Claude  [] Rx:     Wound Cleansing:   Do not scrub or use excessive force. Cleanse wound prior to applying a clean dressing with:  [x] Normal Saline   [x] Mild Soap & Water    [] Keep Wound Dry in Shower  [] Other:      Topical Treatments:  Do not apply lotions, creams, or ointments to wound bed unless directed. [] Apply moisturizing lotion to skin surrounding the wound prior to dressing change.  [] Apply antifungal ointment to skin surrounding the wound prior to dressing change.  [] Apply thin film of moisture barrier ointment to skin immediately around wound. [] Other:       Dressings:           Wound Location RIGHT AND LEFT LEGS   [x] Apply Primary Dressing:       [] MediHoney Gel    [x] MediHoney Alginate               [] Calcium Alginate      [] Calcium Alginate with Silver   [] Collagen                   [] Collagen with Silver                [] Santyl with Moistened saline gauze              [] Hydrofera Blue (cut to size and moistened)  [] Hydrofera Blue Ready (Cut to size)   [] NS wet to dry    [] Betadine wet to dry              [] Hydrogel                [] Mepitel     [] Bactroban/Mupirocin               [] Other:     [x] Cover and Secure with:     [x] Gauze [x] Jamari [] Kerlix   [] Foam [] Super Absorbant [] ABD     [] ACE Wrap            [] Other:    Avoid contact of tape with skin.     [] Change dressing: [] Daily    [] Every Other Day [] Once per week   [] Twice per week   [] Three times per week [] Do Not Change Dressing   [] Other:     Negative Pressure:           Wound Location:   [] Pressure @  mm/Hg  []Continuous []Intermittent   [] Black  [] White Foam              [] Bridge:               [] Change vac dressing three times per week    Pressure Relief:  [] When sitting, shift position or do seat lifts every 15 minutes.  [] Wheelchair cushion [] Specialty Bed/Mattress  [] Turn every 2 hours when in bed. Avoid direct pressure on wound site. Limit side lying to 30 degree tilt. Limit HOB elevation to 30 degrees. Edema Control:  Apply: [] Compression Stocking []Right Leg []Left Leg   [] Tubigrip []Right Leg Double Layer []Left Leg Double Layer      []Right Leg Single Layer []Left Leg Single Layer     [x] Elevate leg(s) above the level of the heart when sitting. [x] Avoid prolonged standing in one place. Compression:  Apply: [x] Multilayer Compression Wrap: []RightLeg []Left Leg                                 []Profore  [x]Profore Lite             []Unna     [x] Do not get leg(s) with wrap wet. If wraps become too tight call the center or completely remove the wrap. [x] Elevate leg(s) above the level of the heart when sitting. [x] Avoid prolonged standing in one place. Off-Loading:   [] Off-loading when [] walking  [] in bed [] sitting  [] Total non-weight bearing  [] Right Leg  [] Left Leg   [] Assistive Device [] Walker [] Cane      [] Wheelchair  [] Crutches   [] Surgical shoe    [] Podus Boot(s)   [] Foam Boot(s)  [] Roll About    [] Cast Boot [] CROW Boot  [] Wedge Shoe  [] Other:    Contact Cast:  Apply: [] Total Contact Cast Applied in Clinic []RightLeg []Left Leg   [] Do not get cast wet. Contact center or go to emergency room if there is a foul odor or becomes uncomfortable due to feeling tight or swelling. Do not use objects inside of cast to scratch.       Dietary:  [] Diet as tolerated: [x] Calorie Diabetic Diet: [] No Added Salt:  [] Increase Protein: [] Other:     Activity:  [x] Activity as tolerated:    [] Patient has no activity restrictions       [] Strict Bedrest:   [] Remain off Work:       [] May return to full duty work:                                     [] Return to work with restrictions:                    [address] Information: Should you experience any significant changes in your wound(s) or have questions about your wound care, please contact Nadira Bundy 26 at [place] [phone] 8:00 am - 4:30. If you need help with your wound outside of these hours and cannot wait until we are again available, contact your PCP or go to the hospital emergency room. PLEASE NOTE: IF YOU ARE UNABLE TO OBTAIN WOUND SUPPLIES, CONTINUE TO USE THE SUPPLIES YOU HAVE AVAILABLE UNTIL YOU ARE ABLE TO REACH US. IT IS MOST IMPORTANT TO KEEP THE WOUND COVERED AT ALL TIMES.     [x] Dr. Juan Chan   [] Dr. Joanie Victor  [] Dr. Candice Whiteside  [] Dr. Albino Mcdonald

## 2021-07-20 NOTE — WOUND CARE
[address]   Progress Note and Procedure Note   NO Procedure      Tori Le  MEDICAL RECORD NUMBER:  [id number]  AGE: 74 y.o. RACE BLACK/  GENDER: male  : 1955  EPISODE DATE:  2021    Subjective:   Vascular studies reveal mild to moderate arterial disease bilateral lower extremities, some venous reflux in the right lower extremity  Chief Complaint   Patient presents with    Wound Check     bilatera llower legs         HISTORY of PRESENT ILLNESS BLANCA Le is a 74 y.o. male who presents today for wound/ulcer evaluation. History of Wound Context: BLE  Wound/Ulcer Pain Timing/Severity: none  Quality of pain: dull  Severity:  0 / 10   Modifying Factors: None  Associated Signs/Symptoms: edema    Ulcer Identification:  Ulcer Type: venous    Contributing Factors: lymphedema    Wound: BLE         PAST MEDICAL HISTORY    Past Medical History:   Diagnosis Date    CKD (chronic kidney disease) stage 4, GFR 15-29 ml/min [facility])     DM (diabetes mellitus) ([facility] [address]     H/O cervical spine surgery     HTN (hypertension)         PAST SURGICAL HISTORY    Past Surgical History:   Procedure Laterality Date    HX ORTHOPAEDIC      Spine Surgery       FAMILY HISTORY    Family History   Problem Relation Age of Onset    Coronary Artery Disease Mother     Heart Failure Mother     Pacemaker Sister        SOCIAL HISTORY    Social History     Tobacco Use    Smoking status: Never Smoker    Smokeless tobacco: Never Used   Substance Use Topics    Alcohol use: Not Currently    Drug use: Not Currently       ALLERGIES    No Known Allergies    MEDICATIONS    Current Outpatient Medications on File Prior to Encounter   Medication Sig Dispense Refill    sodium bicarbonate 650 mg tablet Take 650 mg by mouth two (2) times a day.  ferrous sulfate 325 mg (65 mg iron) tablet Take  by mouth Daily (before breakfast).       furosemide (Lasix) 40 mg tablet Take 40 mg by mouth daily.  atenoloL (TENORMIN) 100 mg tablet Take 50 mg by mouth daily.  hydrALAZINE (APRESOLINE) 50 mg tablet Take 100 mg by mouth three (3) times daily.  famotidine (Pepcid AC) 20 mg tablet Take 20 mg by mouth two (2) times a day.  levothyroxine (SYNTHROID) 50 mcg tablet Take 50 mcg by mouth Daily (before breakfast).  calcitRIOL (ROCALTROL) 0.25 mcg capsule Take 0.25 mcg by mouth daily.  allopurinoL (ZYLOPRIM) 100 mg tablet Take 100 mg by mouth daily. 1.5 tab      amLODIPine (Norvasc) 10 mg tablet Take 10 mg by mouth daily. No current facility-administered medications on file prior to encounter. REVIEW OF SYSTEMS    Pertinent items are noted in HPI. Objective:     Visit Vitals  /65 (BP 1 Location: Right upper arm, BP Patient Position: At rest;Sitting)   Pulse 61   Temp 98.2 °F (36.8 °C)   Resp 18       Wt Readings from Last 3 Encounters:   07/06/21 115.7 kg (255 lb)   01/15/21 114.3 kg (252 lb)       PHYSICAL EXAM  Left and right shin wounds are small and superficial, no evidence of infection  Pertinent items are noted in HPI. Assessment:   Some improvement  Problem List Items Addressed This Visit     None          Procedure Note  Indications:  Based on my examination of this patient's wound(s)/ulcer(s) today, debridement is not required to promote healing and evaluate the wound base.     Wound Leg Left #1 (Active)   Wound Image   07/20/21 1258   Wound Etiology Venous 07/20/21 1258   Dressing Status Other (Comment) 07/13/21 1413   Cleansed Cleansed with saline 07/20/21 1258   Wound Length (cm) 0.9 cm 07/20/21 1258   Wound Width (cm) 0.7 cm 07/20/21 1258   Wound Depth (cm) 0.1 cm 07/20/21 1258   Wound Surface Area (cm^2) 0.63 cm^2 07/20/21 1258   Change in Wound Size % 86 07/20/21 1258   Wound Volume (cm^3) 0.063 cm^3 07/20/21 1258   Wound Healing % 86 07/20/21 1258   Post-Procedure Length (cm) 3 cm 07/13/21 1442   Post-Procedure Width (cm) 1 cm 07/13/21 1442 Post-Procedure Depth (cm) 0.1 cm 07/13/21 1442   Post-Procedure Surface Area (cm^2) 3 cm^2 07/13/21 1442   Post-Procedure Volume (cm^3) 0.3 cm^3 07/13/21 1442   Wound Assessment Granulation tissue 07/20/21 1258   Drainage Amount None 07/20/21 1258   Drainage Description Serosanguinous 07/13/21 1413   Wound Odor None 07/20/21 1258   Mary Grace-Wound/Incision Assessment Intact;Dry/flaky 07/20/21 1258   Edges Attached edges 07/20/21 1258   Wound Thickness Description Partial thickness 07/20/21 1258   Number of days: 14       Wound Leg lower Right #2 (Active)   Wound Image   07/20/21 1300   Wound Etiology Venous 07/20/21 1300   Dressing Status Other (Comment) 07/13/21 1416   Cleansed Cleansed with saline 07/13/21 1416   Wound Length (cm) 0.5 cm 07/20/21 1300   Wound Width (cm) 0.5 cm 07/20/21 1300   Wound Depth (cm) 0.1 cm 07/20/21 1300   Wound Surface Area (cm^2) 0.25 cm^2 07/20/21 1300   Change in Wound Size % 79.17 07/20/21 1300   Wound Volume (cm^3) 0.025 cm^3 07/20/21 1300   Wound Healing % 79 07/20/21 1300   Post-Procedure Length (cm) 1 cm 07/13/21 1416   Post-Procedure Width (cm) 1 cm 07/13/21 1416   Post-Procedure Depth (cm) 0.1 cm 07/13/21 1416   Post-Procedure Surface Area (cm^2) 1 cm^2 07/13/21 1416   Post-Procedure Volume (cm^3) 0.1 cm^3 07/13/21 1416   Wound Assessment Granulation tissue 07/20/21 1300   Drainage Amount Small 07/20/21 1300   Drainage Description Serosanguinous 07/20/21 1300   Wound Odor None 07/20/21 1300   Mary Grace-Wound/Incision Assessment Intact 07/20/21 1300   Edges Attached edges 07/20/21 1300   Wound Thickness Description Partial thickness 07/20/21 1300   Number of days: 14        Plan:   Continue Medihoney alginate  Switch to Profore light  Refer to Dr. Haseeb Elizabeth, vascular    Treatment Note please see attached Discharge Instructions    Written patient dismissal instructions given to patient or POA.          Electronically signed by Bk Luz MD on 7/20/2021 at 1:12 PM

## 2021-07-20 NOTE — DISCHARGE INSTRUCTIONS
Discharge Instructions for  [address] [place] [address], [facility]  Telephone: [phone]     NAME:  Mary Haji  YOB: 1955  MEDICAL RECORD NUMBER:  [id number]  DATE:  7/20/2021        Return Appointment:  [x]? Dressing supply provider: [facility]  []? Home Healthcare:  [x]? Return Appointment: 1 Week(s)  []? Nurse Visit:   Week(s)     []? Discharge from [facility]  []? Ordered tests:    [x]? Referrals: Dr. Cornelius Carney  []? Rx:      Wound Cleansing:   Do not scrub or use excessive force. Cleanse wound prior to applying a clean dressing with:  [x]? Normal Saline          [x]? Mild Soap & Water    []? Keep Wound Dry in Shower  []? Other:       Topical Treatments:  Do not apply lotions, creams, or ointments to wound bed unless directed. []? Apply moisturizing lotion to skin surrounding the wound prior to dressing change. []? Apply antifungal ointment to skin surrounding the wound prior to dressing change. []? Apply thin film of moisture barrier ointment to skin immediately around wound. []? Other:                  Dressings:                  Wound Location RIGHT AND LEFT LEGS              [x]? Apply Primary Dressing:                                          []? MediHoney Gel         [x]? MediHoney Alginate                     []? Calcium Alginate      []? Calcium Alginate with Silver              []? Collagen                   []? Collagen with Silver                []? Santyl with Moistened saline gauze              []? Hydrofera Blue (cut to size and moistened)  []? Hydrofera Blue Ready (Cut to size)              []? NS wet to dry            []? Betadine wet to dry              []? Hydrogel                   []? Mepitel                   []? Bactroban/Mupirocin                       []? Other:      [x]? Cover and Secure with:                   [x]? Gauze        [x]? Yulisa Paez           []?  Klaus []? Foam          []? Super Absorbant    []? ABD                                      []? ACE Wrap            []? Other:               Avoid contact of tape with skin.     []? Change dressing:  []? Daily           []? Every Other Day    []? Once per week   []? Twice per week              []? Three times per week        []? Do Not Change Dressing    []? Other:                Negative Pressure:           Wound Location:   []? Pressure @  mm/Hg                      []?Continuous  []? Intermittent              []? Black          []? White Foam              []? Bridge:               []? Change vac dressing three times per week     Pressure Relief:  []? When sitting, shift position or do seat lifts every 15 minutes. []? Wheelchair cushion           []? Specialty Bed/Mattress  []? Turn every 2 hours when in bed. Avoid direct pressure on wound site. Limit side lying to 30 degree tilt. Limit HOB elevation to 30 degrees. Edema Control:  Apply:  []? Compression Stocking      []? Right Leg     []? Left Leg              []? Tubigrip      []? Right Leg Double Layer      []? Left Leg Double Layer                                      []?Right Leg Single Layer       []? Left Leg Single Layer                 [x]? Elevate leg(s) above the level of the heart when sitting. [x]? Avoid prolonged standing in one place.         Compression:  Apply:  [x]? Multilayer Compression Wrap:      []? RightLeg      []? Left Leg                                 []?Profore            [x]? Profore Lite             []?Unna                 [x]? Do not get leg(s) with wrap wet. If wraps become too tight call the center or completely remove the wrap. [x]? Elevate leg(s) above the level of the heart when sitting. [x]? Avoid prolonged standing in one place.     Off-Loading:   []? Off-loading when   []? walking       []? in bed         []? sitting  []? Total non-weight bearing  []? Right Leg  []?  Left Leg         []? Assistive Device    []? Hector Kiki        []? Cane      []? Wheelchair      []? Crutches              []? Surgical shoe    []? Podus Boot(s)   []? Foam Boot(s)  []? Roll About              []? Cast Boot   []? CROW Boot  []? Wedge Shoe  []? Other:     Contact Cast:  Apply:  []? Total Contact Cast Applied in Clinic          []? RightLeg      []? Left Leg              []? Do not get cast wet. Contact center or go to emergency room if there is a foul odor or becomes uncomfortable due to feeling tight or swelling. Do not use objects inside of cast to scratch.                  Dietary:  []? Diet as tolerated:   [x]? Calorie Diabetic Diet:         []? No Added Salt:  []? Increase Protein:   []?  Other:

## 2021-07-20 NOTE — WOUND CARE
Multilayer Compression Wrap   (Not Unna) Below the Knee    NAME:  Jaylen See  YOB: 1955  MEDICAL RECORD NUMBER:  [id number]  DATE:  7/20/2021    Applied primary and secondary dressing as ordered. Applied multilayered dressing below the knee to right lower leg. Applied multilayered dressing below the knee to left lower leg. Instructed patient/caregiver not to remove dressing and to keep it clean and dry. Instructed patient/caregiver on complications to report to provider, such as pain, numbness in toes, heavy drainage, and slippage of dressing. Instructed patient on purpose of compression dressing and on activity and exercise recommendations.     Response to treatment: Well tolerated by patient       Electronically signed by Leo Mcmillan RN on 7/20/2021 at 2:07 PM

## 2021-07-27 ENCOUNTER — HOSPITAL ENCOUNTER (OUTPATIENT)
Dept: WOUND CARE | Age: 66
Discharge: HOME | End: 2021-07-27
Admitting: SPECIALIST
Payer: MEDICARE

## 2021-07-27 VITALS
HEART RATE: 60 BPM | TEMPERATURE: 97.8 F | SYSTOLIC BLOOD PRESSURE: 128 MMHG | RESPIRATION RATE: 18 BRPM | DIASTOLIC BLOOD PRESSURE: 61 MMHG

## 2021-07-27 PROCEDURE — 74011000250 HC RX REV CODE- 250: Performed by: SPECIALIST

## 2021-07-27 PROCEDURE — 29581 APPL MULTLAYER CMPRN SYS LEG: CPT

## 2021-07-27 RX ORDER — LIDOCAINE HYDROCHLORIDE 20 MG/ML
15 SOLUTION OROPHARYNGEAL AS NEEDED
Status: DISCONTINUED | OUTPATIENT
Start: 2021-07-27 | End: 2021-07-29 | Stop reason: HOSPADM

## 2021-07-27 NOTE — DISCHARGE INSTRUCTIONS
Discharge Instructions for  [address] [place] [address], [address] [place] [address]  Telephone: [phone]     NAME:  Vaibhav Byrnes [place]:  1955  MEDICAL RECORD NUMBER:  [id number]  DATE:  7/27/2021        Return Appointment:  [x]? Dressing supply provider: [facility]- ORDERED JUXTA LITE STOCKINGS  []? Home Healthcare:  [x]? Return Appointment: 1 Week(s)  []? Nurse Visit:   Week(s)     []? Discharge from [facility]  []? Ordered tests:    [x]? Referrals: Dr. Jessica Aleman  []? Rx:      Wound Cleansing:   Do not scrub or use excessive force. Cleanse wound prior to applying a clean dressing with:  [x]? Normal Saline          [x]? Mild Soap & Water    []? Keep Wound Dry in Shower  []? Other:       Topical Treatments:  Do not apply lotions, creams, or ointments to wound bed unless directed. []? Apply moisturizing lotion to skin surrounding the wound prior to dressing change. []? Apply antifungal ointment to skin surrounding the wound prior to dressing change. []? Apply thin film of moisture barrier ointment to skin immediately around wound. []? Other:                  Dressings:                  Wound Location RIGHT AND LEFT LEGS              [x]? Apply Primary Dressing:                                          []? MediHoney Gel         [x]? MediHoney Alginate                     []? Calcium Alginate      []? Calcium Alginate with Silver              []? Collagen                   []? Collagen with Silver                []? Santyl with Moistened saline gauze              []? Hydrofera Blue (cut to size and moistened)  []? Hydrofera Blue Ready (Cut to size)              []? NS wet to dry            []? Betadine wet to dry              []? Hydrogel                   []? Mepitel                   []? Bactroban/Mupirocin                       []? Other:      [x]? Cover and Secure with:                   [x]? Gauze        [x]?  Thai Brandt []? Kerlix              []? Foam          []? Super Absorbant    []? ABD                                      []? ACE Wrap            []? Other:               Avoid contact of tape with skin.     []? Change dressing:  []? Daily           []? Every Other Day    []? Once per week   []? Twice per week              []? Three times per week        []? Do Not Change Dressing    []? Other:                Negative Pressure:           Wound Location:   []? Pressure @  mm/Hg                      []?Continuous  []? Intermittent              []? Black          []? White Foam              []? Bridge:               []? Change vac dressing three times per week     Pressure Relief:  []? When sitting, shift position or do seat lifts every 15 minutes. []? Wheelchair cushion           []? Specialty Bed/Mattress  []? Turn every 2 hours when in bed. Avoid direct pressure on wound site. Limit side lying to 30 degree tilt. Limit HOB elevation to 30 degrees. Edema Control:  Apply:  []? Compression Stocking      []? Right Leg     []? Left Leg              []? Tubigrip      []? Right Leg Double Layer      []? Left Leg Double Layer                                      []?Right Leg Single Layer       []? Left Leg Single Layer                 [x]? Elevate leg(s) above the level of the heart when sitting. [x]? Avoid prolonged standing in one place.         Compression:  Apply:  [x]? Multilayer Compression Wrap:      []? RightLeg      []? Left Leg                                 []?Profore            [x]? Profore Lite             []?Unna                 [x]? Do not get leg(s) with wrap wet. If wraps become too tight call the center or completely remove the wrap. [x]? Elevate leg(s) above the level of the heart when sitting. [x]? Avoid prolonged standing in one place.     Off-Loading:   []? Off-loading when   []? walking       []? in bed         []? sitting  []?  Total non-weight bearing  []? Right Leg  []? Left Leg         []? Assistive Device    []? Sherol Manpreet        []? Cane      []? Wheelchair      []? Crutches              []? Surgical shoe    []? Podus Boot(s)   []? Foam Boot(s)  []? Roll About              []? Cast Boot   []? CROW Boot  []? Wedge Shoe  []? Other:     Contact Cast:  Apply:  []? Total Contact Cast Applied in Clinic          []? RightLeg      []? Left Leg              []? Do not get cast wet. Contact center or go to emergency room if there is a foul odor or becomes uncomfortable due to feeling tight or swelling. Do not use objects inside of cast to scratch.                  Dietary:  []? Diet as tolerated:   [x]? Calorie Diabetic Diet:         []? No Added Salt:  []? Increase Protein:   []? Other:              Activity:  [x]? Activity as tolerated:    []? Patient has no activity restrictions       []? Strict Bedrest:          []? Remain off Work:       []? May return to full duty work:                                               []? Return to work with restrictions:      [address] Information: Should you experience any significant changes in your wound(s) or have questions about your wound care, please contact Nadira Bundy 26 at [place] [address] 8:00 am - 4:30. If you need help with your wound outside of these hours and cannot wait until we are again available, contact your PCP or go to the hospital emergency room.      PLEASE NOTE: IF YOU ARE [place]BLE [place] [address], CONTINUE TO USE THE SUPPLIES YOU HAVE AVAILABLE UNTIL YOU ARE ABLE TO [facility]. IT IS MOST IMPORTANT TO KEEP THE WOUND COVERED AT ALL TIMES.     [x]? Dr. Hiram Adler   []? Dr. Karsten Levy  []? Dr. Chance Dalal  []?  Dr. Jennifer Duncan

## 2021-07-27 NOTE — WOUND CARE
Multilayer Compression Wrap   (Not Unna) Below the Knee    NAME:  Jesus Fox  YOB: 1955  MEDICAL RECORD NUMBER:  [id number]  DATE:  7/27/2021    Removed old Multilayer wrap if indicated and wash leg with mild soap/water. Applied moisturizing agent to dry skin as needed. Applied primary and secondary dressing as ordered. Applied multilayered dressing below the knee to right lower leg. Applied multilayered dressing below the knee to left lower leg. Instructed patient/caregiver not to remove dressing and to keep it clean and dry. Instructed patient/caregiver on complications to report to provider, such as pain, numbness in toes, heavy drainage, and slippage of dressing. Instructed patient on purpose of compression dressing and on activity and exercise recommendations.     Response to treatment: Well tolerated by patient       Electronically signed by Dora Vidal LPN on 1/10/0271 at 3:93 PM

## 2021-07-27 NOTE — WOUND CARE
[facility] [address] f: [phone] f: [phone] p: [phone] [email or web address]     Ordering Center:     [facility] OP WOUND CARE  [address] [facility]  [address] [id number] [phone]  WOUND CARE [unfilled]   FAX NUMBER [unfilled]    Patient Information:      Susie Emanuel   [facility] [address] [address]   [unfilled]   : 1955  AGE: 74 y.o. GENDER: male   TODAYS DATE:  2021    Insurance:      PRIMARY INSURANCE:  [id number] - (Medicare)    Payor/Plan Subscr  Sex Relation Sub. Ins. ID Effective Group Num   1. [address] 1955 Male Self [id number]                                   [address] [address]   2. [address] D 1955 Male Self [id number]                                   [address] [address] -       Patient Wound Information:      Problem List Items Addressed This Visit     None          WOUNDS REQUIRING DRESSING SUPPLIES:     Wound Leg Left #1 (Active)   Wound Image   21 1301   Wound Etiology Venous 21 1301   Dressing Status Clean;Dry; Intact 21 1301   Cleansed Cleansed with saline 21 1301   Offloading for Diabetic Foot Ulcers No offloading required 21 1301   Wound Length (cm) 1 cm 21 1301   Wound Width (cm) 0.6 cm 21 1301   Wound Depth (cm) 0.1 cm 21 1301   Wound Surface Area (cm^2) 0.6 cm^2 21 1301   Change in Wound Size % 86.67 21 1301   Wound Volume (cm^3) 0.06 cm^3 21 1301   Wound Healing % 87 21 1301   Post-Procedure Length (cm) 3 cm 21 1442   Post-Procedure Width (cm) 1 cm 21 1442   Post-Procedure Depth (cm) 0.1 cm 21 1442   Post-Procedure Surface Area (cm^2) 3 cm^2 21 1442   Post-Procedure Volume (cm^3) 0.3 cm^3 21 1442   Wound Assessment Granulation tissue;Slough 21 1301   Drainage Amount Small 07/27/21 1301   Drainage Description Serosanguinous 07/27/21 1301   Wound Odor None 07/27/21 1301   Mary Grace-Wound/Incision Assessment Intact;Dry/flaky 07/27/21 1301   Edges Attached edges 07/27/21 1301   Wound Thickness Description Full thickness 07/27/21 1301   Number of days: 21       Wound Leg lower Right #2 (Active)   Wound Image   07/27/21 1300   Wound Etiology Venous 07/27/21 1300   Dressing Status Clean;Dry; Intact 07/27/21 1300   Cleansed Cleansed with saline 07/27/21 1300   Offloading for Diabetic Foot Ulcers No offloading required 07/27/21 1300   Wound Length (cm) 3 cm 07/27/21 1300   Wound Width (cm) 1.2 cm 07/27/21 1300   Wound Depth (cm) 0.1 cm 07/27/21 1300   Wound Surface Area (cm^2) 3.6 cm^2 07/27/21 1300   Change in Wound Size % -200 07/27/21 1300   Wound Volume (cm^3) 0.36 cm^3 07/27/21 1300   Wound Healing % -200 07/27/21 1300   Post-Procedure Length (cm) 1 cm 07/13/21 1416   Post-Procedure Width (cm) 1 cm 07/13/21 1416   Post-Procedure Depth (cm) 0.1 cm 07/13/21 1416   Post-Procedure Surface Area (cm^2) 1 cm^2 07/13/21 1416   Post-Procedure Volume (cm^3) 0.1 cm^3 07/13/21 1416   Wound Assessment Granulation tissue;Slough 07/27/21 1300   Drainage Amount Scant 07/27/21 1300   Drainage Description Serosanguinous 07/27/21 1300   Wound Odor None 07/27/21 1300   Mary Grace-Wound/Incision Assessment Intact 07/27/21 1300   Edges Attached edges 07/27/21 1300   Wound Thickness Description Full thickness 07/27/21 1301   Number of days: 21        Supplies Requested :      WOUND #:1   PRIMARY DRESSING:  Other: Juxta lite     left leg calf 42.5, ankle 27, length 40     FREQUENCY OF DRESSING CHANGES:  Daily       WOUND #:2   PRIMARY DRESSING:  Other: Juxta lite     right leg calf 46.  Ankle 28, length 40     FREQUENCY OF DRESSING CHANGES:  Daily     ADDITIONAL ITEMS:  [] Gloves Small  [] Gloves Medium [] Gloves Large [] Gloves XLarge  [] Tape 1\" [x] Tape 2\" [] Tape 3\"  [] Medipore Tape  [] Saline  [] Skin Prep   [] Adhesive Remover   [] Cotton Tip Applicators   [] Other:    Patient Wound(s) Debrided: [x] Yes if yes please add date 7/13/2021   [] No    Debribement Type: Excisional/Sharp    Patient currently being seen by Home Health: [] Yes   [x] No    Duration for needed supplies:  []15  [x]30  []60  []90 Days    Electronically signed by Sylvie Montoya RN on 7/27/2021 at 1:14 PM    Provider Information:      PROVIDER'S NAME: Dr. Genia Proctor

## 2021-07-27 NOTE — WOUND CARE
[address]   Progress Note and Procedure Note   NO Procedure      Lalitha Francisco  MEDICAL RECORD NUMBER:  [id number]  AGE: 74 y.o. RACE BLACK/  GENDER: male  : 1955  EPISODE DATE:  2021    Subjective:   Has been evaluated by Dr Kati Akins for ablation, await insurance approval  Chief Complaint   Patient presents with    Wound Check     Christiano lower leg         HISTORY of PRESENT ILLNESS HPI    Lalitha Francisco is a 74 y.o. male who presents today for wound/ulcer evaluation. History of Wound Context: BLE  Wound/Ulcer Pain Timing/Severity: none  Quality of pain: dull  Severity:  0 / 10   Modifying Factors: None  Associated Signs/Symptoms: edema    Ulcer Identification:  Ulcer Type: venous    Contributing Factors: lymphedema    Wound: BLE         PAST MEDICAL HISTORY    Past Medical History:   Diagnosis Date    CKD (chronic kidney disease) stage 4, GFR 15-29 ml/min [facility])     DM (diabetes mellitus) ([facility] [address]     H/O cervical spine surgery     HTN (hypertension)         PAST SURGICAL HISTORY    Past Surgical History:   Procedure Laterality Date    HX ORTHOPAEDIC      Spine Surgery       FAMILY HISTORY    Family History   Problem Relation Age of Onset    Coronary Artery Disease Mother     Heart Failure Mother     Pacemaker Sister        SOCIAL HISTORY    Social History     Tobacco Use    Smoking status: Never Smoker    Smokeless tobacco: Never Used   Substance Use Topics    Alcohol use: Not Currently    Drug use: Not Currently       ALLERGIES    No Known Allergies    MEDICATIONS    Current Outpatient Medications on File Prior to Encounter   Medication Sig Dispense Refill    sodium bicarbonate 650 mg tablet Take 650 mg by mouth two (2) times a day.  ferrous sulfate 325 mg (65 mg iron) tablet Take  by mouth Daily (before breakfast).  furosemide (Lasix) 40 mg tablet Take 40 mg by mouth daily.       atenoloL (TENORMIN) 100 mg tablet Take 50 mg by mouth daily.      hydrALAZINE (APRESOLINE) 50 mg tablet Take 100 mg by mouth three (3) times daily.  famotidine (Pepcid AC) 20 mg tablet Take 20 mg by mouth two (2) times a day.  levothyroxine (SYNTHROID) 50 mcg tablet Take 50 mcg by mouth Daily (before breakfast).  calcitRIOL (ROCALTROL) 0.25 mcg capsule Take 0.25 mcg by mouth daily.  allopurinoL (ZYLOPRIM) 100 mg tablet Take 100 mg by mouth daily. 1.5 tab      amLODIPine (Norvasc) 10 mg tablet Take 10 mg by mouth daily. No current facility-administered medications on file prior to encounter. REVIEW OF SYSTEMS    Pertinent items are noted in HPI. Objective:     Visit Vitals  /61 (BP 1 Location: Right upper arm, BP Patient Position: At rest;Sitting)   Pulse 60   Temp 97.8 °F (36.6 °C)   Resp 18       Wt Readings from Last 3 Encounters:   07/06/21 115.7 kg (255 lb)   01/15/21 114.3 kg (252 lb)       PHYSICAL EXAM  BLE superficial ulcerations slightly improved, no infection  Pertinent items are noted in HPI. Assessment:   Some improvement   Problem List Items Addressed This Visit     None          Procedure Note  Indications:  Based on my examination of this patient's wound(s)/ulcer(s) today, debridement is not required to promote healing and evaluate the wound base. Wound Leg Left #1 (Active)   Wound Image   07/27/21 1301   Wound Etiology Venous 07/27/21 1301   Dressing Status Clean;Dry; Intact 07/27/21 1301   Cleansed Cleansed with saline 07/27/21 1301   Offloading for Diabetic Foot Ulcers No offloading required 07/27/21 1301   Wound Length (cm) 1 cm 07/27/21 1301   Wound Width (cm) 0.6 cm 07/27/21 1301   Wound Depth (cm) 0.1 cm 07/27/21 1301   Wound Surface Area (cm^2) 0.6 cm^2 07/27/21 1301   Change in Wound Size % 86.67 07/27/21 1301   Wound Volume (cm^3) 0.06 cm^3 07/27/21 1301   Wound Healing % 87 07/27/21 1301   Post-Procedure Length (cm) 3 cm 07/13/21 1442   Post-Procedure Width (cm) 1 cm 07/13/21 1443 Post-Procedure Depth (cm) 0.1 cm 07/13/21 1442   Post-Procedure Surface Area (cm^2) 3 cm^2 07/13/21 1442   Post-Procedure Volume (cm^3) 0.3 cm^3 07/13/21 1442   Wound Assessment Granulation tissue;Slough 07/27/21 1301   Drainage Amount Small 07/27/21 1301   Drainage Description Serosanguinous 07/27/21 1301   Wound Odor None 07/27/21 1301   Mary Grace-Wound/Incision Assessment Intact;Dry/flaky 07/27/21 1301   Edges Attached edges 07/27/21 1301   Wound Thickness Description Full thickness 07/27/21 1301   Number of days: 21       Wound Leg lower Right #2 (Active)   Wound Image   07/27/21 1300   Wound Etiology Venous 07/27/21 1300   Dressing Status Clean;Dry; Intact 07/27/21 1300   Cleansed Cleansed with saline 07/27/21 1300   Offloading for Diabetic Foot Ulcers No offloading required 07/27/21 1300   Wound Length (cm) 3 cm 07/27/21 1300   Wound Width (cm) 1.2 cm 07/27/21 1300   Wound Depth (cm) 0.1 cm 07/27/21 1300   Wound Surface Area (cm^2) 3.6 cm^2 07/27/21 1300   Change in Wound Size % -200 07/27/21 1300   Wound Volume (cm^3) 0.36 cm^3 07/27/21 1300   Wound Healing % -200 07/27/21 1300   Post-Procedure Length (cm) 1 cm 07/13/21 1416   Post-Procedure Width (cm) 1 cm 07/13/21 1416   Post-Procedure Depth (cm) 0.1 cm 07/13/21 1416   Post-Procedure Surface Area (cm^2) 1 cm^2 07/13/21 1416   Post-Procedure Volume (cm^3) 0.1 cm^3 07/13/21 1416   Wound Assessment Granulation tissue;Slough 07/27/21 1300   Drainage Amount Scant 07/27/21 1300   Drainage Description Serosanguinous 07/27/21 1300   Wound Odor None 07/27/21 1300   Mary Grace-Wound/Incision Assessment Intact 07/27/21 1300   Edges Attached edges 07/27/21 1300   Wound Thickness Description Full thickness 07/27/21 1301   Number of days: 21        Plan:   Continue medihoney alginate  Continue profore lite  Dr Neeraj Coulter evaluating for venous ablation  Measure for juxta-lite    Treatment Note please see attached Discharge Instructions    Written patient dismissal instructions given to patient or POA.          Electronically signed by Rubi Wang MD on 7/27/2021 at 1:09 PM

## 2021-07-27 NOTE — WOUND CARE
Discharge Instructions for  [address] [place] [address], [address] [place] [address]  Telephone: [phone]    NAME:  Duc Alejo:  1955  MEDICAL RECORD NUMBER:  [id number]  DATE:  7/27/2021      Return Appointment:  [x] Dressing supply provider: [address]  [] Home Healthcare:  [x] Return Appointment: 1 Week(s)  [] Nurse Visit:   Week(s)    [] Discharge from [facility]  [] Ordered tests:    [x] Referrals: Dr. Jazz Saavedra  [] Rx:     Wound Cleansing:   Do not scrub or use excessive force. Cleanse wound prior to applying a clean dressing with:  [x] Normal Saline   [x] Mild Soap & Water    [] Keep Wound Dry in Shower  [] Other:      Topical Treatments:  Do not apply lotions, creams, or ointments to wound bed unless directed. [] Apply moisturizing lotion to skin surrounding the wound prior to dressing change.  [] Apply antifungal ointment to skin surrounding the wound prior to dressing change.  [] Apply thin film of moisture barrier ointment to skin immediately around wound. [] Other:       Dressings:           Wound Location RIGHT AND LEFT LEGS   [x] Apply Primary Dressing:       [] MediHoney Gel    [x] MediHoney Alginate               [] Calcium Alginate      [] Calcium Alginate with Silver   [] Collagen                   [] Collagen with Silver                [] Santyl with Moistened saline gauze              [] Hydrofera Blue (cut to size and moistened)  [] Hydrofera Blue Ready (Cut to size)   [] NS wet to dry    [] Betadine wet to dry              [] Hydrogel                [] Mepitel     [] Bactroban/Mupirocin               [] Other:     [x] Cover and Secure with:     [x] Gauze [x] Jamari [] Kerlix   [] Foam [] Super Absorbant [] ABD     [] ACE Wrap            [] Other:    Avoid contact of tape with skin.     [] Change dressing: [] Daily    [] Every Other Day [] Once per week   [] Twice per week   [] Three times per week [] Do Not Change Dressing   [] Other:     Negative Pressure:           Wound Location:   [] Pressure @  mm/Hg  []Continuous []Intermittent   [] Black  [] White Foam              [] Bridge:               [] Change vac dressing three times per week    Pressure Relief:  [] When sitting, shift position or do seat lifts every 15 minutes.  [] Wheelchair cushion [] Specialty Bed/Mattress  [] Turn every 2 hours when in bed. Avoid direct pressure on wound site. Limit side lying to 30 degree tilt. Limit HOB elevation to 30 degrees. Edema Control:  Apply: [] Compression Stocking []Right Leg []Left Leg   [] Tubigrip []Right Leg Double Layer []Left Leg Double Layer      []Right Leg Single Layer []Left Leg Single Layer     [x] Elevate leg(s) above the level of the heart when sitting. [x] Avoid prolonged standing in one place. Compression:  Apply: [x] Multilayer Compression Wrap: []RightLeg []Left Leg                                 []Profore  [x]Profore Lite             []Unna     [x] Do not get leg(s) with wrap wet. If wraps become too tight call the center or completely remove the wrap. [x] Elevate leg(s) above the level of the heart when sitting. [x] Avoid prolonged standing in one place. Off-Loading:   [] Off-loading when [] walking  [] in bed [] sitting  [] Total non-weight bearing  [] Right Leg  [] Left Leg   [] Assistive Device [] Walker [] Cane      [] Wheelchair  [] Crutches   [] Surgical shoe    [] Podus Boot(s)   [] Foam Boot(s)  [] Roll About    [] Cast Boot [] CROW Boot  [] Wedge Shoe  [] Other:    Contact Cast:  Apply: [] Total Contact Cast Applied in Clinic []RightLeg []Left Leg   [] Do not get cast wet. Contact center or go to emergency room if there is a foul odor or becomes uncomfortable due to feeling tight or swelling. Do not use objects inside of cast to scratch.       Dietary:  [] Diet as tolerated: [x] Calorie Diabetic Diet: [] No Added Salt:  [] Increase Protein: [] Other:     Activity:  [x] Activity as tolerated:    [] Patient has no activity restrictions       [] Strict Bedrest:   [] Remain off Work:       [] May return to full duty work:                                     [] Return to work with restrictions:                    [address] Information: Should you experience any significant changes in your wound(s) or have questions about your wound care, please contact Nadira Dubon at [place] [address] 8:00 am - 4:30. If you need help with your wound outside of these hours and cannot wait until we are again available, contact your PCP or go to the hospital emergency room. PLEASE NOTE: IF YOU ARE UNABLE TO OBTAIN WOUND SUPPLIES, CONTINUE TO USE THE SUPPLIES YOU HAVE AVAILABLE UNTIL YOU ARE ABLE TO REACH US. IT IS MOST IMPORTANT TO KEEP THE WOUND COVERED AT ALL TIMES.     [x] Dr. Katie Ibarra   [] Dr. Jennifer Mondragon  [] Dr. Gumaro Clemons  [] Dr. Emily Henning

## 2021-08-03 ENCOUNTER — HOSPITAL ENCOUNTER (OUTPATIENT)
Dept: WOUND CARE | Age: 66
Discharge: HOME | End: 2021-08-03
Admitting: SPECIALIST
Payer: MEDICARE

## 2021-08-03 VITALS
HEART RATE: 62 BPM | RESPIRATION RATE: 18 BRPM | SYSTOLIC BLOOD PRESSURE: 143 MMHG | DIASTOLIC BLOOD PRESSURE: 70 MMHG | TEMPERATURE: 97.6 F

## 2021-08-03 PROCEDURE — 74011000250 HC RX REV CODE- 250: Performed by: SPECIALIST

## 2021-08-03 PROCEDURE — 99213 OFFICE O/P EST LOW 20 MIN: CPT

## 2021-08-03 RX ORDER — LIDOCAINE HYDROCHLORIDE 20 MG/ML
15 SOLUTION OROPHARYNGEAL AS NEEDED
Status: DISCONTINUED | OUTPATIENT
Start: 2021-08-03 | End: 2021-08-05 | Stop reason: HOSPADM

## 2021-08-03 NOTE — DISCHARGE INSTRUCTIONS
Discharge Instructions for  [facility], [facility]  Telephone: [phone]     NAME:  Vaibhav Casarez:  1955  MEDICAL RECORD NUMBER:  [id number]  DATE:  8/3/2021        Return Appointment:  [x]? Dressing supply provider: [employer]- [employer] ORDERED [employer]UXTA LITE STOCKINGS  []? Home Healthcare:  [x]? Return Appointment: 1 Week(s)  []? Nurse Visit:   Week(s)     []? Discharge from [facility]  []? Ordered tests:    [x]? Referrals: Dr. Javon Olmstead  []? Rx:      Wound Cleansing:   Do not scrub or use excessive force. Cleanse wound prior to applying a clean dressing with:  [x]? Normal Saline          [x]? Mild Soap & Water    []? Keep Wound Dry in Shower  []? Other:       Topical Treatments:  Do not apply lotions, creams, or ointments to wound bed unless directed. []? Apply moisturizing lotion to skin surrounding the wound prior to dressing change. []? Apply antifungal ointment to skin surrounding the wound prior to dressing change. []? Apply thin film of moisture barrier ointment to skin immediately around wound. []? Other:                  Dressings:                  Wound Location RIGHT  leg  [x]? Apply Primary Dressing:                                          []? MediHoney Gel         [x]? MediHoney Alginate                     []? Calcium Alginate      []? Calcium Alginate with Silver              []? Collagen                   []? Collagen with Silver                []? Santyl with Moistened saline gauze              []? Hydrofera Blue (cut to size and moistened)  []? Hydrofera Blue Ready (Cut to size)              []? NS wet to dry            []? Betadine wet to dry              []? Hydrogel                   []? Mepitel                   []? Bactroban/Mupirocin                       []? Other:      [x]? Cover and Secure with:                   [x]? Gauze        [x]? Jennifer Dapper           []?  Kerlix []? Foam          []? Super Absorbant    []? ABD                                      []? ACE Wrap            []? Other:               Avoid contact of tape with skin.     [x]? Change dressing:  []? Daily           [x]? Every Other Day    []? Once per week   []? Twice per week              []? Three times per week        []? Do Not Change Dressing    []? Other:                Negative Pressure:           Wound Location:   []? Pressure @  mm/Hg                      []?Continuous  []? Intermittent              []? Black          []? White Foam              []? Bridge:               []? Change vac dressing three times per week     Pressure Relief:  []? When sitting, shift position or do seat lifts every 15 minutes. []? Wheelchair cushion           []? Specialty Bed/Mattress  []? Turn every 2 hours when in bed. Avoid direct pressure on wound site. Limit side lying to 30 degree tilt. Limit HOB elevation to 30 degrees. Edema Control:  Apply:  []? Compression Stocking      []? Right Leg     []? Left Leg              [x]? Tubigrip      []? Right Leg Double Layer      []? Left Leg Double Layer                                      [x]? Right Leg Single Layer       []? Left Leg Single Layer                 [x]? Elevate leg(s) above the level of the heart when sitting. [x]? Avoid prolonged standing in one place.         Compression:  Apply:  []? Multilayer Compression Wrap:      []? RightLeg      []? Left Leg                                 []?Profore            [x]? Profore Lite             []?Unna                 []? Do not get leg(s) with wrap wet. If wraps become too tight call the center or completely remove the wrap. []? Elevate leg(s) above the level of the heart when sitting. []? Avoid prolonged standing in one place.     Off-Loading:   []? Off-loading when   []? walking       []? in bed         []? sitting  []? Total non-weight bearing  []? Right Leg  []? Left Leg         []? Assistive Device    []? Keith Peek        []? Cane      []? Wheelchair      []? Crutches              []? Surgical shoe    []? Podus Boot(s)   []? Foam Boot(s)  []? Roll About              []? Cast Boot   []? CROW Boot  []? Wedge Shoe  []? Other:     Contact Cast:  Apply:  []? Total Contact Cast Applied in Clinic          []? RightLeg      []? Left Leg              []? Do not get cast wet. Contact center or go to emergency room if there is a foul odor or becomes uncomfortable due to feeling tight or swelling. Do not use objects inside of cast to scratch.                  Dietary:  []? Diet as tolerated:   [x]? Calorie Diabetic Diet:         []? No Added Salt:  []? Increase Protein:   []? Other:              Activity:  [x]? Activity as tolerated:    []? Patient has no activity restrictions       []? Strict Bedrest:          []? Remain off Work:       []? May return to full duty work:                                               []? Return to work with restrictions:      [address] Information: Should you experience any significant changes in your wound(s) or have questions about your wound care, please contact Nadira Bundy 26 at [place] [address] 8:00 am - 4:30. If you need help with your wound outside of these hours and cannot wait until we are again available, contact your PCP or go to the hospital emergency room.      PLEASE NOTE: IF YOU ARE [place]NABLE TO [place] [address], CONTINUE TO USE THE SUPPLIES YOU HAVE AVAILABLE UNTIL YOU ARE ABLE TO [facility]. IT IS MOST IMPORTANT TO KEEP THE WOUND COVERED AT ALL TIMES.     [x]? Dr. Radonna Fabry   []? Dr. Patrick Tucker  []? Dr. Colleen Hernandez  []?  Dr. Gale Quintana

## 2021-08-03 NOTE — WOUND CARE
[address]   Progress Note and Procedure Note   NO Procedure      Emeterio Landa  MEDICAL RECORD NUMBER:  [id number]  AGE: 74 y.o. RACE BLACK/  GENDER: male  : 1955  EPISODE DATE:  8/3/2021    Subjective:   No new problems  Chief Complaint   Patient presents with    Wound Check     R lower leg         HISTORY of PRESENT ILLNESS HPI    Emeterio Landa is a 74 y.o. male who presents today for wound/ulcer evaluation. History of Wound Context: RLE  Wound/Ulcer Pain Timing/Severity: none  Quality of pain: dull  Severity:  0 / 10   Modifying Factors: None  Associated Signs/Symptoms: edema    Ulcer Identification:  Ulcer Type: venous    Contributing Factors: edema    Wound: RLE         PAST MEDICAL HISTORY    Past Medical History:   Diagnosis Date    CKD (chronic kidney disease) stage 4, [facility] 15-29 ml[facility]min [facility])     [facility]     H/O cervical spine surgery     HTN (hypertension)         PAST SURGICAL HISTORY    Past Surgical History:   Procedure Laterality Date    HX ORTHOPAEDIC      Spine Surgery       FAMILY HISTORY    Family History   Problem Relation Age of Onset    Coronary Artery Disease Mother     Heart Failure Mother     Pacemaker Sister        SOCIAL HISTORY    Social History     Tobacco Use    Smoking status: Never Smoker    Smokeless tobacco: Never Used   Substance Use Topics    Alcohol use: Not Currently    Drug use: Not Currently       ALLERGIES    No Known Allergies    MEDICATIONS    Current Outpatient Medications on File Prior to Encounter   Medication Sig Dispense Refill    sodium bicarbonate 650 mg tablet Take 650 mg by mouth two (2) times a day.  ferrous sulfate 325 mg (65 mg iron) tablet Take  by mouth Daily (before breakfast).  furosemide (Lasix) 40 mg tablet Take 40 mg by mouth daily.  atenoloL (TENORMIN) 100 mg tablet Take 50 mg by mouth daily.       hydrALAZINE (APRESOLINE) 50 mg tablet Take 100 mg by mouth three (3) times daily.  famotidine (Pepcid AC) 20 mg tablet Take 20 mg by mouth two (2) times a day.  levothyroxine (SYNTHROID) 50 mcg tablet Take 50 mcg by mouth Daily (before breakfast).  calcitRIOL (ROCALTROL) 0.25 mcg capsule Take 0.25 mcg by mouth daily.  allopurinoL (ZYLOPRIM) 100 mg tablet Take 100 mg by mouth daily. 1.5 tab      amLODIPine (Norvasc) 10 mg tablet Take 10 mg by mouth daily. No current facility-administered medications on file prior to encounter. REVIEW OF SYSTEMS    Pertinent items are noted in HPI. Objective:     Visit Vitals  BP (!) 143/70 (BP 1 Location: Right upper arm, BP Patient Position: At rest;Sitting)   Pulse 62   Temp 97.6 °F (36.4 °C)   Resp 18       Wt Readings from Last 3 Encounters:   07/06/21 115.7 kg (255 lb)   01/15/21 114.3 kg (252 lb)       PHYSICAL EXAM  R shin wound small, almost healed  L leg wound healed  Pertinent items are noted in HPI. Assessment:    almost healed  Problem List Items Addressed This Visit     None          Procedure Note  Indications:  Based on my examination of this patient's wound(s)/ulcer(s) today, debridement is not required to promote healing and evaluate the wound base. Wound Leg lower Right #2 (Active)   Wound Image   08/03/21 1327   Wound Etiology Venous 08/03/21 1327   Dressing Status Clean;Dry; Intact 08/03/21 1327   Cleansed Cleansed with saline 08/03/21 1327   Offloading for Diabetic Foot Ulcers No offloading required 08/03/21 1327   Wound Length (cm) 0.7 cm 08/03/21 1327   Wound Width (cm) 0.5 cm 08/03/21 1327   Wound Depth (cm) 0.1 cm 08/03/21 1327   Wound Surface Area (cm^2) 0.35 cm^2 08/03/21 1327   Change in Wound Size % 70.83 08/03/21 1327   Wound Volume (cm^3) 0.035 cm^3 08/03/21 1327   Wound Healing % 71 08/03/21 1327   Post-Procedure Length (cm) 1 cm 07/13/21 1416   Post-Procedure Width (cm) 1 cm 07/13/21 1416   Post-Procedure Depth (cm) 0.1 cm 07/13/21 1416   Post-Procedure Surface Area (cm^2) 1 cm^2 07/13/21 1416   Post-Procedure Volume (cm^3) 0.1 cm^3 07/13/21 1416   Wound Assessment Slough; Hyper granulation tissue 08/03/21 1327   Drainage Amount Scant 08/03/21 1327   Drainage Description Serosanguinous 08/03/21 1327   Wound Odor None 08/03/21 1327   Mary Grace-Wound/Incision Assessment Intact;Dry/flaky 08/03/21 1327   Edges Attached edges 08/03/21 1327   Wound Thickness Description Full thickness 08/03/21 1327   Number of days: 28        Plan:   Continue Medihoney Alginate, compression, follow up with Dr Ghanshyam Alfredo    Treatment Note please see attached Discharge Instructions    Written patient dismissal instructions given to patient or POA.          Electronically signed by Dima Harmon MD on 8/3/2021 at 1:43 PM

## 2021-08-03 NOTE — WOUND CARE
Discharge Instructions for  [address] [place] [address], [address] [place] [address]  Telephone: [phone]    NAME:  Gaye Quiñones OF BIRTH:  1955  MEDICAL RECORD NUMBER:  [id number]  DATE:  8/3/2021      Return Appointment:  [x] Dressing supply provider: [address]  [] Home Healthcare:  [x] Return Appointment: 1 Week(s)  [] Nurse Visit:   Week(s)    [] Discharge from [facility]  [] Ordered tests:    [x] Referrals: Dr. Dorie Lamas  [] Rx:     Wound Cleansing:   Do not scrub or use excessive force. Cleanse wound prior to applying a clean dressing with:  [x] Normal Saline   [x] Mild Soap & Water    [] Keep Wound Dry in Shower  [] Other:      Topical Treatments:  Do not apply lotions, creams, or ointments to wound bed unless directed. [] Apply moisturizing lotion to skin surrounding the wound prior to dressing change.  [] Apply antifungal ointment to skin surrounding the wound prior to dressing change.  [] Apply thin film of moisture barrier ointment to skin immediately around wound. [] Other:       Dressings:           Wound Location RIGHT  leg  [x] Apply Primary Dressing:       [] MediHoney Gel    [x] MediHoney Alginate               [] Calcium Alginate      [] Calcium Alginate with Silver   [] Collagen                   [] Collagen with Silver                [] Santyl with Moistened saline gauze              [] Hydrofera Blue (cut to size and moistened)  [] Hydrofera Blue Ready (Cut to size)   [] NS wet to dry    [] Betadine wet to dry              [] Hydrogel                [] Mepitel     [] Bactroban/Mupirocin               [] Other:     [x] Cover and Secure with:     [x] Gauze [x] Jamari [] Kerlix   [] Foam [] Super Absorbant [] ABD     [] ACE Wrap            [] Other:    Avoid contact of tape with skin.     [x] Change dressing: [] Daily    [x] Every Other Day [] Once per week   [] Twice per week   [] Three times per week [] Do Not Change Dressing   [] Other:     Negative Pressure:           Wound Location:   [] Pressure @  mm/Hg  []Continuous []Intermittent   [] Black  [] White Foam              [] Bridge:               [] Change vac dressing three times per week    Pressure Relief:  [] When sitting, shift position or do seat lifts every 15 minutes.  [] Wheelchair cushion [] Specialty Bed/Mattress  [] Turn every 2 hours when in bed. Avoid direct pressure on wound site. Limit side lying to 30 degree tilt. Limit HOB elevation to 30 degrees. Edema Control:  Apply: [] Compression Stocking []Right Leg []Left Leg   [x] Tubigrip []Right Leg Double Layer []Left Leg Double Layer      [x]Right Leg Single Layer []Left Leg Single Layer     [x] Elevate leg(s) above the level of the heart when sitting. [x] Avoid prolonged standing in one place. Compression:  Apply: [] Multilayer Compression Wrap: []RightLeg []Left Leg                                 []Profore  [x]Profore Lite             []Unna     [] Do not get leg(s) with wrap wet. If wraps become too tight call the center or completely remove the wrap. [] Elevate leg(s) above the level of the heart when sitting. [] Avoid prolonged standing in one place. Off-Loading:   [] Off-loading when [] walking  [] in bed [] sitting  [] Total non-weight bearing  [] Right Leg  [] Left Leg   [] Assistive Device [] Walker [] Cane      [] Wheelchair  [] Crutches   [] Surgical shoe    [] Podus Boot(s)   [] Foam Boot(s)  [] Roll About    [] Cast Boot [] CROW Boot  [] Wedge Shoe  [] Other:    Contact Cast:  Apply: [] Total Contact Cast Applied in Clinic []RightLeg []Left Leg   [] Do not get cast wet. Contact center or go to emergency room if there is a foul odor or becomes uncomfortable due to feeling tight or swelling. Do not use objects inside of cast to scratch.       Dietary:  [] Diet as tolerated: [x] Calorie Diabetic Diet: [] No Added Salt:  [] Increase Protein: [] Other:     Activity:  [x] Activity as tolerated:    [] Patient has no activity restrictions       [] Strict Bedrest:   [] Remain off Work:       [] May return to full duty work:                                     [] Return to work with restrictions:                    [address] Information: Should you experience any significant changes in your wound(s) or have questions about your wound care, please contact Nadira Dubon at [place] [address] 8:00 am - 4:30. If you need help with your wound outside of these hours and cannot wait until we are again available, contact your PCP or go to the hospital emergency room. PLEASE NOTE: IF YOU ARE UNABLE TO OBTAIN WOUND SUPPLIES, CONTINUE TO USE THE SUPPLIES YOU HAVE AVAILABLE UNTIL YOU ARE ABLE TO REACH US. IT IS MOST IMPORTANT TO KEEP THE WOUND COVERED AT ALL TIMES.     [x] Dr. Radonna Fabry   [] Dr. Patrick Tucker  [] Dr. Colleen Hernandez  [] Dr. Gale Quintana

## 2021-08-10 ENCOUNTER — HOSPITAL ENCOUNTER (OUTPATIENT)
Dept: WOUND CARE | Age: 66
Discharge: HOME | End: 2021-08-10
Admitting: SPECIALIST
Payer: MEDICARE

## 2021-08-10 VITALS
TEMPERATURE: 97.9 F | SYSTOLIC BLOOD PRESSURE: 123 MMHG | RESPIRATION RATE: 18 BRPM | DIASTOLIC BLOOD PRESSURE: 63 MMHG | HEART RATE: 57 BPM

## 2021-08-10 PROCEDURE — 74011000250 HC RX REV CODE- 250: Performed by: SPECIALIST

## 2021-08-10 PROCEDURE — 99213 OFFICE O/P EST LOW 20 MIN: CPT

## 2021-08-10 RX ORDER — LIDOCAINE HYDROCHLORIDE 20 MG/ML
15 SOLUTION OROPHARYNGEAL AS NEEDED
Status: DISCONTINUED | OUTPATIENT
Start: 2021-08-10 | End: 2021-08-12 | Stop reason: HOSPADM

## 2021-08-10 NOTE — DISCHARGE INSTRUCTIONS
Discharge Instructions for  [address] [place] [address]  Telephone: [phone]    NAME:  Dulce Pill:  1955  MEDICAL RECORD NUMBER:  [id number]  DATE:  8/10/2021      Return Appointment:  [] Dressing supply provider:   [] Home Healthcare:  [x] Return Appointment: 2 Week(s)  [] Nurse Visit:   Week(s)    [] Discharge from [facility]  [] Ordered tests:    [x] Referrals: Dr. Lucy Shaw   [] Rx:     Wound Cleansing:   Do not scrub or use excessive force. Cleanse wound prior to applying a clean dressing with:  [] Normal Saline   [x] Mild Soap & Water    [] Keep Wound Dry in Shower  [] Other:      Topical Treatments:  Do not apply lotions, creams, or ointments to wound bed unless directed. [x] Apply moisturizing lotion to skin surrounding the wound prior to dressing change.  [] Apply antifungal ointment to skin surrounding the wound prior to dressing change.  [] Apply thin film of moisture barrier ointment to skin immediately around wound. [] Other:       Dressings:           Wound Location R leg  [x] Apply Primary Dressing:       [] MediHoney Gel    [x] MediHoney Alginate               [] Calcium Alginate      [] Calcium Alginate with Silver   [] Collagen                   [] Collagen with Silver                [] Santyl with Moistened saline gauze              [] Hydrofera Blue (cut to size and moistened)  [] Hydrofera Blue Ready (Cut to size)   [] NS wet to dry    [] Betadine wet to dry              [] Hydrogel                [] Mepitel     [] Bactroban/Mupirocin               [] Other:     [x] Cover and Secure with:     [x] Gauze [x] Jamari [] Kerlix   [] Foam [] Super Absorbant [] ABD     [] ACE Wrap            [] Other:    Avoid contact of tape with skin.     [x] Change dressing: [] Daily    [] Every Other Day [] Once per week   [] Twice per week   [x] Three times per week [] Do Not Change Dressing   [] Other:        Edema Control:  Apply: [] Compression Stocking []Right Leg []Left Leg   [x] Tubigrip []Right Leg Double Layer []Left Leg Double Layer      [x]Right Leg Single Layer []Left Leg Single Layer     [x] Elevate leg(s) above the level of the heart when sitting. [x] Avoid prolonged standing in one place. Dietary:  [] Diet as tolerated: [x] Calorie Diabetic Diet: [x] No Added Salt:  [] Increase Protein: [] Other:     Activity:  [x] Activity as tolerated:    [] Patient has no activity restrictions       [] Strict Bedrest:   [] Remain off Work:       [] May return to full duty work:                                     [] Return to work with restrictions:                    [address] Information: Should you experience any significant changes in your wound(s) or have questions about your wound care, please contact Nadira Dubon at [place] [address] 8:00 am - 4:30. If you need help with your wound outside of these hours and cannot wait until we are again available, contact your PCP or go to the hospital emergency room. PLEASE NOTE: IF YOU ARE UNABLE TO OBTAIN WOUND SUPPLIES, CONTINUE TO USE THE SUPPLIES YOU HAVE AVAILABLE UNTIL YOU ARE ABLE TO REACH US. IT IS MOST IMPORTANT TO KEEP THE WOUND COVERED AT ALL TIMES.     [x] Dr. Sindy Telles   [] Dr. Gwen Hurtado  [] Dr. Sandip Calhoun  [] Dr. Tiffany Pond

## 2021-08-10 NOTE — WOUND CARE
[address]   Progress Note and Procedure Note   NO Procedure      Yandy Gleason  MEDICAL RECORD NUMBER:  [id number]  AGE: 74 y.o. RACE BLACK/  GENDER: male  : 1955  EPISODE DATE:  8/10/2021    Subjective:   No new problems    Chief Complaint   Patient presents with    Wound Check     right leg         HISTORY of PRESENT ILLNESS HPI    Yandy Gleason is a 74 y.o. male who presents today for wound/ulcer evaluation. History of Wound Context: RLE  Wound/Ulcer Pain Timing/Severity: none  Quality of pain: dull  Severity:  0 / 10   Modifying Factors: None  Associated Signs/Symptoms: edema    Ulcer Identification:  Ulcer Type: venous    Contributing Factors: lymphedema    Wound: RLE         PAST MEDICAL HISTORY    Past Medical History:   Diagnosis Date    CKD (chronic kidney disease) stage 4, GFR 15-29 ml/min [facility])     DM [facility]diabetes mellitus) [facility] [address]     H/O cervical spine surgery     HTN (hypertension)         PAST SURGICAL HISTORY    Past Surgical History:   Procedure Laterality Date    HX ORTHOPAEDIC      Spine Surgery       FAMILY HISTORY    Family History   Problem Relation Age of Onset    Coronary Artery Disease Mother     Heart Failure Mother     Pacemaker Sister        SOCIAL HISTORY    Social History     Tobacco Use    Smoking status: Never Smoker    Smokeless tobacco: Never Used   Substance Use Topics    Alcohol use: Not Currently    Drug use: Not Currently       ALLERGIES    No Known Allergies    MEDICATIONS    Current Outpatient Medications on File Prior to Encounter   Medication Sig Dispense Refill    sodium bicarbonate 650 mg tablet Take 650 mg by mouth two (2) times a day.  ferrous sulfate 325 mg (65 mg iron) tablet Take  by mouth Daily (before breakfast).  furosemide (Lasix) 40 mg tablet Take 40 mg by mouth daily.  atenoloL (TENORMIN) 100 mg tablet Take 50 mg by mouth daily.       hydrALAZINE (APRESOLINE) 50 mg tablet Take 100 mg by mouth three (3) times daily.  famotidine (Pepcid AC) 20 mg tablet Take 20 mg by mouth two (2) times a day.  levothyroxine (SYNTHROID) 50 mcg tablet Take 50 mcg by mouth Daily (before breakfast).  calcitRIOL (ROCALTROL) 0.25 mcg capsule Take 0.25 mcg by mouth daily.  allopurinoL (ZYLOPRIM) 100 mg tablet Take 100 mg by mouth daily. 1.5 tab      amLODIPine (Norvasc) 10 mg tablet Take 10 mg by mouth daily. No current facility-administered medications on file prior to encounter. REVIEW OF SYSTEMS    Pertinent items are noted in HPI. Objective:     Visit Vitals  /63 (BP 1 Location: Right upper arm, BP Patient Position: At rest;Sitting)   Pulse (!) 57   Temp 97.9 °F (36.6 °C)   Resp 18       Wt Readings from Last 3 Encounters:   07/06/21 115.7 kg (255 lb)   01/15/21 114.3 kg (252 lb)       PHYSICAL EXAM  Pinpoint open wound on R anterior leg with drainage of clear fluid    Pertinent items are noted in HPI. Assessment:   Very nearly healed     Problem List Items Addressed This Visit     None          Procedure Note  Indications:  Based on my examination of this patient's wound(s)/ulcer(s) today, debridement is not required to promote healing and evaluate the wound base. Wound Leg lower Right #2 (Active)   Wound Image   08/10/21 1308   Wound Etiology Venous 08/10/21 1308   Dressing Status Clean;Dry; Intact 08/10/21 1308   Cleansed Cleansed with saline 08/10/21 1308   Offloading for Diabetic Foot Ulcers No offloading required 08/10/21 1308   Wound Length (cm) 0.1 cm 08/10/21 1308   Wound Width (cm) 0.1 cm 08/10/21 1308   Wound Depth (cm) 0.1 cm 08/10/21 1308   Wound Surface Area (cm^2) 0.01 cm^2 08/10/21 1308   Change in Wound Size % 99.17 08/10/21 1308   Wound Volume (cm^3) 0.001 cm^3 08/10/21 1308   Wound Healing % 99 08/10/21 1308   Post-Procedure Length (cm) 1 cm 07/13/21 1416   Post-Procedure Width (cm) 1 cm 07/13/21 1416   Post-Procedure Depth (cm) 0.1 cm 07/13/21 1416   Post-Procedure Surface Area (cm^2) 1 cm^2 07/13/21 1416   Post-Procedure Volume (cm^3) 0.1 cm^3 07/13/21 1416   Wound Assessment Granulation tissue 08/10/21 1308   Drainage Amount Scant 08/10/21 1308   Drainage Description Serosanguinous 08/10/21 1308   Wound Odor None 08/10/21 1308   Mary Grace-Wound/Incision Assessment Intact; Hyperpigmented 08/10/21 1308   Edges Attached edges 08/10/21 1308   Wound Thickness Description Full thickness 08/10/21 1308   Number of days: 35        Plan:   Continue Medihoney and compression    Treatment Note please see attached Discharge Instructions    Written patient dismissal instructions given to patient or POA.          Electronically signed by Sandy Mendez MD on 8/10/2021 at 1:18 PM

## 2021-08-10 NOTE — WOUND CARE
Discharge Instructions for  [address] [place] [address], [address] [place] [address]  Telephone: [phone]    NAME:  Melanie Martinez:  1955  MEDICAL RECORD NUMBER:  [id number]  DATE:  8/10/2021      Return Appointment:  [] Dressing supply provider:   [] Home Healthcare:  [x] Return Appointment: 2 Week(s)  [] Nurse Visit:   Week(s)    [] Discharge from [facility]  [] Ordered tests:    [x] Referrals: Dr. Gorge Santana   [] Rx:     Wound Cleansing:   Do not scrub or use excessive force. Cleanse wound prior to applying a clean dressing with:  [] Normal Saline   [x] Mild Soap & Water    [] Keep Wound Dry in Shower  [] Other:      Topical Treatments:  Do not apply lotions, creams, or ointments to wound bed unless directed. [x] Apply moisturizing lotion to skin surrounding the wound prior to dressing change.  [] Apply antifungal ointment to skin surrounding the wound prior to dressing change.  [] Apply thin film of moisture barrier ointment to skin immediately around wound. [] Other:       Dressings:           Wound Location R leg  [x] Apply Primary Dressing:       [] MediHoney Gel    [x] MediHoney Alginate               [] Calcium Alginate      [] Calcium Alginate with Silver   [] Collagen                   [] Collagen with Silver                [] Santyl with Moistened saline gauze              [] Hydrofera Blue (cut to size and moistened)  [] Hydrofera Blue Ready (Cut to size)   [] NS wet to dry    [] Betadine wet to dry              [] Hydrogel                [] Mepitel     [] Bactroban/Mupirocin               [] Other:     [x] Cover and Secure with:     [x] Gauze [x] Jamari [] Kerlix   [] Foam [] Super Absorbant [] ABD     [] ACE Wrap            [] Other:    Avoid contact of tape with skin.     [x] Change dressing: [] Daily    [] Every Other Day [] Once per week   [] Twice per week   [x] Three times per week [] Do Not Change Dressing   [] Other:        Edema Control:  Apply: [] Compression Stocking []Right Leg []Left Leg   [x] Tubigrip []Right Leg Double Layer []Left Leg Double Layer      [x]Right Leg Single Layer []Left Leg Single Layer     [x] Elevate leg(s) above the level of the heart when sitting. [x] Avoid prolonged standing in one place. Dietary:  [] Diet as tolerated: [x] Calorie Diabetic Diet: [x] No Added Salt:  [] Increase Protein: [] Other:     Activity:  [x] Activity as tolerated:    [] Patient has no activity restrictions       [] Strict Bedrest:   [] Remain off Work:       [] May return to full duty work:                                     [] Return to work with restrictions:                    [address] Information: Should you experience any significant changes in your wound(s) or have questions about your wound care, please contact Nadira Dubon at [place] [address] 8:00 am - 4:30. If you need help with your wound outside of these hours and cannot wait until we are again available, contact your PCP or go to the hospital emergency room. PLEASE NOTE: IF YOU ARE UNABLE TO OBTAIN WOUND SUPPLIES, CONTINUE TO USE THE SUPPLIES YOU HAVE AVAILABLE UNTIL YOU ARE ABLE TO REACH US. IT IS MOST IMPORTANT TO KEEP THE WOUND COVERED AT ALL TIMES.     [x] Dr. Jim Weller   [] Dr. Jenny Spears  [] Dr. Oli Gr  [] Dr. Jewel Hutchison

## 2022-03-18 PROBLEM — I87.312 IDIOPATHIC CHRONIC VENOUS HYPERTENSION OF LEFT LEG WITH ULCER (HCC): Status: ACTIVE | Noted: 2021-07-06

## 2022-03-18 PROBLEM — N19 KIDNEY FAILURE: Status: ACTIVE | Noted: 2021-07-06

## 2022-03-18 PROBLEM — L97.929 IDIOPATHIC CHRONIC VENOUS HYPERTENSION OF LEFT LEG WITH ULCER (HCC): Status: ACTIVE | Noted: 2021-07-06

## 2022-03-19 PROBLEM — I87.311 IDIOPATHIC CHRONIC VENOUS HYPERTENSION OF RIGHT LEG WITH ULCER (HCC): Status: ACTIVE | Noted: 2021-07-06

## 2022-03-19 PROBLEM — J18.9 BILATERAL PNEUMONIA: Status: ACTIVE | Noted: 2021-01-15

## 2022-03-19 PROBLEM — U07.1 COVID-19: Status: ACTIVE | Noted: 2021-01-15

## 2022-03-19 PROBLEM — L97.919 IDIOPATHIC CHRONIC VENOUS HYPERTENSION OF RIGHT LEG WITH ULCER (HCC): Status: ACTIVE | Noted: 2021-07-06

## 2022-03-20 PROBLEM — J96.01 ACUTE RESPIRATORY FAILURE WITH HYPOXIA (HCC): Status: ACTIVE | Noted: 2021-01-15

## 2022-03-20 PROBLEM — R60.0 LOWER EXTREMITY EDEMA: Status: ACTIVE | Noted: 2021-07-06

## 2023-05-01 ENCOUNTER — HOSPITAL ENCOUNTER (OUTPATIENT)
Dept: INTERVENTIONAL RADIOLOGY/VASCULAR | Age: 68
Discharge: HOME | End: 2023-05-01
Attending: LEGAL MEDICINE
Payer: MEDICARE

## 2023-05-01 DIAGNOSIS — N18.6 END STAGE RENAL DISEASE (HCC): ICD-10-CM

## 2023-05-01 PROCEDURE — 36558 INSERT TUNNELED CV CATH: CPT

## 2023-05-01 PROCEDURE — 77001 FLUOROGUIDE FOR VEIN DEVICE: CPT

## 2023-05-01 PROCEDURE — 76937 US GUIDE VASCULAR ACCESS: CPT

## 2023-05-01 RX ORDER — SODIUM CHLORIDE 9 MG/ML
25 INJECTION, SOLUTION INTRAVENOUS CONTINUOUS
Status: DISCONTINUED | OUTPATIENT
Start: 2023-05-01 | End: 2023-05-01

## 2023-05-01 RX ORDER — MIDAZOLAM HYDROCHLORIDE 1 MG/ML
.5-2 INJECTION, SOLUTION INTRAMUSCULAR; INTRAVENOUS
Status: DISCONTINUED | OUTPATIENT
Start: 2023-05-01 | End: 2023-05-01

## 2023-05-01 RX ORDER — FENTANYL CITRATE 50 UG/ML
12.5-5 INJECTION, SOLUTION INTRAMUSCULAR; INTRAVENOUS
Status: DISCONTINUED | OUTPATIENT
Start: 2023-05-01 | End: 2023-05-01

## 2023-05-16 RX ORDER — SODIUM BICARBONATE 650 MG/1
650 TABLET ORAL 2 TIMES DAILY
COMMUNITY

## 2023-05-16 RX ORDER — ATENOLOL 100 MG/1
50 TABLET ORAL DAILY
COMMUNITY

## 2023-05-16 RX ORDER — AMLODIPINE BESYLATE 10 MG/1
10 TABLET ORAL DAILY
COMMUNITY

## 2023-05-16 RX ORDER — HYDRALAZINE HYDROCHLORIDE 50 MG/1
100 TABLET, FILM COATED ORAL 3 TIMES DAILY
COMMUNITY

## 2023-05-16 RX ORDER — FERROUS SULFATE 325(65) MG
TABLET ORAL
COMMUNITY

## 2023-05-16 RX ORDER — ALLOPURINOL 100 MG/1
100 TABLET ORAL DAILY
COMMUNITY

## 2023-05-16 RX ORDER — FUROSEMIDE 40 MG/1
40 TABLET ORAL DAILY
COMMUNITY

## 2023-05-16 RX ORDER — LEVOTHYROXINE SODIUM 0.05 MG/1
50 TABLET ORAL
COMMUNITY

## 2023-05-16 RX ORDER — FAMOTIDINE 20 MG/1
20 TABLET, FILM COATED ORAL 2 TIMES DAILY
COMMUNITY

## 2023-05-16 RX ORDER — CALCITRIOL 0.25 UG/1
0.25 CAPSULE, LIQUID FILLED ORAL DAILY
COMMUNITY

## 2023-06-03 ENCOUNTER — HOSPITAL ENCOUNTER (EMERGENCY)
Facility: HOSPITAL | Age: 68
Discharge: HOME | End: 2023-06-04
Attending: EMERGENCY MEDICINE
Payer: MEDICARE

## 2023-06-03 ENCOUNTER — APPOINTMENT (OUTPATIENT)
Facility: HOSPITAL | Age: 68
End: 2023-06-03
Payer: MEDICARE

## 2023-06-03 DIAGNOSIS — F12.10 MARIJUANA ABUSE: Primary | ICD-10-CM

## 2023-06-03 LAB
ALBUMIN SERPL-MCNC: 2.9 G/DL (ref 3.5–5)
ALBUMIN/GLOB SERPL: 0.8 (ref 1.1–2.2)
ALP SERPL-CCNC: 85 U/L (ref 45–117)
ALT SERPL-CCNC: 17 U/L (ref 12–78)
ANION GAP SERPL CALC-SCNC: 7 MMOL/L (ref 5–15)
AST SERPL W P-5'-P-CCNC: 23 U/L (ref 15–37)
BASOPHILS # BLD: 0 K/UL (ref 0–0.1)
BASOPHILS NFR BLD: 0 % (ref 0–1)
BILIRUB SERPL-MCNC: 0.3 MG/DL (ref 0.2–1)
BUN SERPL-MCNC: 29 MG/DL (ref 6–20)
BUN/CREAT SERPL: 9 (ref 12–20)
CA-I BLD-MCNC: 8.3 MG/DL (ref 8.5–10.1)
CHLORIDE SERPL-SCNC: 97 MMOL/L (ref 97–108)
CO2 SERPL-SCNC: 32 MMOL/L (ref 21–32)
CREAT SERPL-MCNC: 3.19 MG/DL (ref 0.7–1.3)
DIFFERENTIAL METHOD BLD: ABNORMAL
EOSINOPHIL # BLD: 0.3 K/UL (ref 0–0.4)
EOSINOPHIL NFR BLD: 6 % (ref 0–7)
ERYTHROCYTE [DISTWIDTH] IN BLOOD BY AUTOMATED COUNT: 15.5 % (ref 11.5–14.5)
GLOBULIN SER CALC-MCNC: 3.8 G/DL (ref 2–4)
GLUCOSE BLD STRIP.AUTO-MCNC: 95 MG/DL (ref 65–100)
GLUCOSE SERPL-MCNC: 99 MG/DL (ref 65–100)
HCT VFR BLD AUTO: 27.5 % (ref 36.6–50.3)
HGB BLD-MCNC: 9 G/DL (ref 12.1–17)
IMM GRANULOCYTES # BLD AUTO: 0 K/UL (ref 0–0.04)
IMM GRANULOCYTES NFR BLD AUTO: 1 % (ref 0–0.5)
INR PPP: 1.1 (ref 0.9–1.1)
LYMPHOCYTES # BLD: 1 K/UL (ref 0.8–3.5)
LYMPHOCYTES NFR BLD: 17 % (ref 12–49)
MCH RBC QN AUTO: 30.2 PG (ref 26–34)
MCHC RBC AUTO-ENTMCNC: 32.7 G/DL (ref 30–36.5)
MCV RBC AUTO: 92.3 FL (ref 80–99)
MONOCYTES # BLD: 0.7 K/UL (ref 0–1)
MONOCYTES NFR BLD: 11 % (ref 5–13)
NEUTS SEG # BLD: 3.9 K/UL (ref 1.8–8)
NEUTS SEG NFR BLD: 65 % (ref 32–75)
NRBC # BLD: 0 K/UL (ref 0–0.01)
NRBC BLD-RTO: 0 PER 100 WBC
PERFORMED BY:: NORMAL
PLATELET # BLD AUTO: 144 K/UL (ref 150–400)
PMV BLD AUTO: 9.4 FL (ref 8.9–12.9)
POTASSIUM SERPL-SCNC: 4.2 MMOL/L (ref 3.5–5.1)
PROT SERPL-MCNC: 6.7 G/DL (ref 6.4–8.2)
PROTHROMBIN TIME: 14.7 SEC (ref 11.9–14.6)
RBC # BLD AUTO: 2.98 M/UL (ref 4.1–5.7)
SODIUM SERPL-SCNC: 136 MMOL/L (ref 136–145)
WBC # BLD AUTO: 6 K/UL (ref 4.1–11.1)

## 2023-06-03 PROCEDURE — 85610 PROTHROMBIN TIME: CPT

## 2023-06-03 PROCEDURE — 99284 EMERGENCY DEPT VISIT MOD MDM: CPT

## 2023-06-03 PROCEDURE — 70450 CT HEAD/BRAIN W/O DYE: CPT

## 2023-06-03 PROCEDURE — 85025 COMPLETE CBC W/AUTO DIFF WBC: CPT

## 2023-06-03 PROCEDURE — 82962 GLUCOSE BLOOD TEST: CPT

## 2023-06-03 PROCEDURE — 93005 ELECTROCARDIOGRAM TRACING: CPT | Performed by: EMERGENCY MEDICINE

## 2023-06-03 PROCEDURE — 80053 COMPREHEN METABOLIC PANEL: CPT

## 2023-06-03 PROCEDURE — 36415 COLL VENOUS BLD VENIPUNCTURE: CPT

## 2023-06-03 ASSESSMENT — LIFESTYLE VARIABLES
HOW OFTEN DO YOU HAVE A DRINK CONTAINING ALCOHOL: NEVER
HOW MANY STANDARD DRINKS CONTAINING ALCOHOL DO YOU HAVE ON A TYPICAL DAY: PATIENT DOES NOT DRINK

## 2023-06-03 ASSESSMENT — PAIN - FUNCTIONAL ASSESSMENT
PAIN_FUNCTIONAL_ASSESSMENT: NONE - DENIES PAIN
PAIN_FUNCTIONAL_ASSESSMENT: NONE - DENIES PAIN

## 2023-06-04 VITALS
HEART RATE: 78 BPM | SYSTOLIC BLOOD PRESSURE: 120 MMHG | HEIGHT: 67 IN | WEIGHT: 270 LBS | OXYGEN SATURATION: 100 % | RESPIRATION RATE: 20 BRPM | DIASTOLIC BLOOD PRESSURE: 68 MMHG | BODY MASS INDEX: 42.38 KG/M2 | TEMPERATURE: 97.9 F

## 2023-06-04 ASSESSMENT — PAIN - FUNCTIONAL ASSESSMENT: PAIN_FUNCTIONAL_ASSESSMENT: NONE - DENIES PAIN

## 2023-06-05 LAB
EKG ATRIAL RATE: 72 BPM
EKG DIAGNOSIS: NORMAL
EKG P AXIS: 4 DEGREES
EKG P-R INTERVAL: 168 MS
EKG Q-T INTERVAL: 410 MS
EKG QRS DURATION: 94 MS
EKG QTC CALCULATION (BAZETT): 448 MS
EKG R AXIS: -8 DEGREES
EKG T AXIS: 119 DEGREES
EKG VENTRICULAR RATE: 72 BPM

## 2023-08-24 NOTE — PERIOP NOTE
Wear your hair loose or down, no ponytails, buns, sindy pins or clips. All body piercings must be removed. Please shower with antibacterial soap for three consecutive days before and on the morning of surgery, but do not apply any lotions, powders or deodorants after the shower on the day of surgery. Please use a fresh towels after each shower. Please sleep in clean clothes and change bed linens the night before surgery. Please do not shave for 48 hours prior to surgery. Shaving of the face is acceptable. 7. You should understand that if you do not follow these instructions your surgery may be cancelled. If your physical condition changes (I.e. fever, cold or flu) please contact your surgeon as soon as possible. 8. It is important that you be on time. If a situation occurs where you may be late, please call [phone] (OR Holding Area). 9. If you have any questions and or problems, please call [phone] (Pre-admission Testing). 10. Your surgery time may be subject to change. You will receive a phone call the evening prior if your time changes. 11.  If having outpatient surgery, you must have someone to drive you here, stay with you during the duration of your stay, and to drive you home at time of discharge. TAKE ALL MEDICATIONS DAY OF SURGERY EXCEPT:  None      I understand a pre-operative phone call will be made to verify my surgery time. In the event that I am not available, I give permission for a message to be left on my answering service and/or with another person?   yes         ___________________      __________   _________    (Signature of Patient)             (Witness)                (Date and Time)

## 2023-08-28 ENCOUNTER — ANESTHESIA EVENT (OUTPATIENT)
Facility: HOSPITAL | Age: 68
End: 2023-08-28
Payer: MEDICARE

## 2023-08-28 ENCOUNTER — HOSPITAL ENCOUNTER (OUTPATIENT)
Facility: HOSPITAL | Age: 68
Setting detail: OUTPATIENT SURGERY
Discharge: HOME | End: 2023-08-28
Attending: SURGERY | Admitting: SURGERY
Payer: MEDICARE

## 2023-08-28 ENCOUNTER — ANESTHESIA (OUTPATIENT)
Facility: HOSPITAL | Age: 68
End: 2023-08-28
Payer: MEDICARE

## 2023-08-28 VITALS
HEIGHT: 67 IN | BODY MASS INDEX: 41.7 KG/M2 | OXYGEN SATURATION: 94 % | SYSTOLIC BLOOD PRESSURE: 128 MMHG | RESPIRATION RATE: 10 BRPM | TEMPERATURE: 97.6 F | HEART RATE: 58 BPM | DIASTOLIC BLOOD PRESSURE: 71 MMHG | WEIGHT: 265.65 LBS

## 2023-08-28 DIAGNOSIS — N18.6 ESRD (END STAGE RENAL DISEASE) (HCC): Primary | ICD-10-CM

## 2023-08-28 LAB
ANION GAP BLD CALC-SCNC: 10 MMOL/L (ref 10–20)
CA-I BLD-MCNC: 1.18 MMOL/L (ref 1.12–1.32)
CHLORIDE BLD-SCNC: 98 MMOL/L (ref 98–107)
CO2 BLD-SCNC: 29.5 MMOL/L (ref 21–32)
CREAT BLD-MCNC: 6.6 MG/DL (ref 0.6–1.3)
GLUCOSE BLD-MCNC: 91 MG/DL (ref 65–100)
POTASSIUM BLD-SCNC: 4.1 MMOL/L (ref 3.5–5.1)
SERVICE CMNT-IMP: ABNORMAL
SODIUM BLD-SCNC: 136 MMOL/L (ref 136–145)

## 2023-08-28 PROCEDURE — 2580000003 HC RX 258: Performed by: NURSE ANESTHETIST, CERTIFIED REGISTERED

## 2023-08-28 PROCEDURE — 6360000002 HC RX W HCPCS: Performed by: SURGERY

## 2023-08-28 PROCEDURE — 6370000000 HC RX 637 (ALT 250 FOR IP): Performed by: ANESTHESIOLOGY

## 2023-08-28 PROCEDURE — 7100000000 HC PACU RECOVERY - FIRST 15 MIN: Performed by: SURGERY

## 2023-08-28 PROCEDURE — 2580000003 HC RX 258: Performed by: SURGERY

## 2023-08-28 PROCEDURE — 7100000001 HC PACU RECOVERY - ADDTL 15 MIN: Performed by: SURGERY

## 2023-08-28 PROCEDURE — 3700000001 HC ADD 15 MINUTES (ANESTHESIA): Performed by: SURGERY

## 2023-08-28 PROCEDURE — 6360000002 HC RX W HCPCS: Performed by: NURSE ANESTHETIST, CERTIFIED REGISTERED

## 2023-08-28 PROCEDURE — 2500000003 HC RX 250 WO HCPCS: Performed by: ANESTHESIOLOGY

## 2023-08-28 PROCEDURE — 3600000012 HC SURGERY LEVEL 2 ADDTL 15MIN: Performed by: SURGERY

## 2023-08-28 PROCEDURE — 2500000003 HC RX 250 WO HCPCS: Performed by: SURGERY

## 2023-08-28 PROCEDURE — 7100000011 HC PHASE II RECOVERY - ADDTL 15 MIN: Performed by: SURGERY

## 2023-08-28 PROCEDURE — 2500000003 HC RX 250 WO HCPCS: Performed by: NURSE ANESTHETIST, CERTIFIED REGISTERED

## 2023-08-28 PROCEDURE — 7100000010 HC PHASE II RECOVERY - FIRST 15 MIN: Performed by: SURGERY

## 2023-08-28 PROCEDURE — 80047 BASIC METABLC PNL IONIZED CA: CPT

## 2023-08-28 PROCEDURE — 3700000000 HC ANESTHESIA ATTENDED CARE: Performed by: SURGERY

## 2023-08-28 PROCEDURE — 6370000000 HC RX 637 (ALT 250 FOR IP): Performed by: SURGERY

## 2023-08-28 PROCEDURE — A4217 STERILE WATER/SALINE, 500 ML: HCPCS | Performed by: SURGERY

## 2023-08-28 PROCEDURE — 2709999900 HC NON-CHARGEABLE SUPPLY: Performed by: SURGERY

## 2023-08-28 PROCEDURE — 3600000002 HC SURGERY LEVEL 2 BASE: Performed by: SURGERY

## 2023-08-28 PROCEDURE — 6360000002 HC RX W HCPCS: Performed by: ANESTHESIOLOGY

## 2023-08-28 PROCEDURE — 2580000003 HC RX 258: Performed by: STUDENT IN AN ORGANIZED HEALTH CARE EDUCATION/TRAINING PROGRAM

## 2023-08-28 RX ORDER — OXYCODONE HYDROCHLORIDE 5 MG/1
10 TABLET ORAL PRN
Status: DISCONTINUED | OUTPATIENT
Start: 2023-08-28 | End: 2023-08-28 | Stop reason: HOSPADM

## 2023-08-28 RX ORDER — HYDRALAZINE HYDROCHLORIDE 20 MG/ML
10 INJECTION INTRAMUSCULAR; INTRAVENOUS
Status: DISCONTINUED | OUTPATIENT
Start: 2023-08-28 | End: 2023-08-29 | Stop reason: HOSPADM

## 2023-08-28 RX ORDER — ACETAMINOPHEN 500 MG
1000 TABLET ORAL ONCE
Status: COMPLETED | OUTPATIENT
Start: 2023-08-28 | End: 2023-08-28

## 2023-08-28 RX ORDER — SODIUM CHLORIDE 9 MG/ML
INJECTION, SOLUTION INTRAVENOUS PRN
Status: DISCONTINUED | OUTPATIENT
Start: 2023-08-28 | End: 2023-08-29 | Stop reason: HOSPADM

## 2023-08-28 RX ORDER — SODIUM CHLORIDE 9 MG/ML
INJECTION, SOLUTION INTRAVENOUS PRN
Status: DISCONTINUED | OUTPATIENT
Start: 2023-08-28 | End: 2023-08-28 | Stop reason: HOSPADM

## 2023-08-28 RX ORDER — DEXMEDETOMIDINE HYDROCHLORIDE 100 UG/ML
INJECTION, SOLUTION INTRAVENOUS PRN
Status: DISCONTINUED | OUTPATIENT
Start: 2023-08-28 | End: 2023-08-28 | Stop reason: SDUPTHER

## 2023-08-28 RX ORDER — SODIUM CHLORIDE, SODIUM LACTATE, POTASSIUM CHLORIDE, CALCIUM CHLORIDE 600; 310; 30; 20 MG/100ML; MG/100ML; MG/100ML; MG/100ML
INJECTION, SOLUTION INTRAVENOUS CONTINUOUS
Status: DISCONTINUED | OUTPATIENT
Start: 2023-08-28 | End: 2023-08-28 | Stop reason: HOSPADM

## 2023-08-28 RX ORDER — SODIUM CHLORIDE 0.9 % (FLUSH) 0.9 %
5-40 SYRINGE (ML) INJECTION EVERY 12 HOURS SCHEDULED
Status: DISCONTINUED | OUTPATIENT
Start: 2023-08-28 | End: 2023-08-28 | Stop reason: HOSPADM

## 2023-08-28 RX ORDER — PROTAMINE SULFATE 10 MG/ML
INJECTION, SOLUTION INTRAVENOUS PRN
Status: DISCONTINUED | OUTPATIENT
Start: 2023-08-28 | End: 2023-08-28 | Stop reason: SDUPTHER

## 2023-08-28 RX ORDER — DIPHENHYDRAMINE HYDROCHLORIDE 50 MG/ML
12.5 INJECTION INTRAMUSCULAR; INTRAVENOUS
Status: DISCONTINUED | OUTPATIENT
Start: 2023-08-28 | End: 2023-08-29 | Stop reason: HOSPADM

## 2023-08-28 RX ORDER — SODIUM CHLORIDE 0.9 % (FLUSH) 0.9 %
5-40 SYRINGE (ML) INJECTION PRN
Status: DISCONTINUED | OUTPATIENT
Start: 2023-08-28 | End: 2023-08-28 | Stop reason: HOSPADM

## 2023-08-28 RX ORDER — DEXAMETHASONE SODIUM PHOSPHATE 4 MG/ML
4 INJECTION, SOLUTION INTRA-ARTICULAR; INTRALESIONAL; INTRAMUSCULAR; INTRAVENOUS; SOFT TISSUE
Status: DISCONTINUED | OUTPATIENT
Start: 2023-08-28 | End: 2023-08-29 | Stop reason: HOSPADM

## 2023-08-28 RX ORDER — FENTANYL CITRATE 50 UG/ML
100 INJECTION, SOLUTION INTRAMUSCULAR; INTRAVENOUS
Status: DISCONTINUED | OUTPATIENT
Start: 2023-08-28 | End: 2023-08-28 | Stop reason: HOSPADM

## 2023-08-28 RX ORDER — LIDOCAINE HYDROCHLORIDE 20 MG/ML
INJECTION, SOLUTION EPIDURAL; INFILTRATION; INTRACAUDAL; PERINEURAL
Status: COMPLETED | OUTPATIENT
Start: 2023-08-28 | End: 2023-08-28

## 2023-08-28 RX ORDER — SODIUM CHLORIDE 9 MG/ML
INJECTION, SOLUTION INTRAVENOUS ONCE
Status: COMPLETED | OUTPATIENT
Start: 2023-08-28 | End: 2023-08-28

## 2023-08-28 RX ORDER — MIDAZOLAM HYDROCHLORIDE 2 MG/2ML
2 INJECTION, SOLUTION INTRAMUSCULAR; INTRAVENOUS
Status: DISCONTINUED | OUTPATIENT
Start: 2023-08-28 | End: 2023-08-28 | Stop reason: HOSPADM

## 2023-08-28 RX ORDER — HEPARIN SODIUM 5000 [USP'U]/ML
INJECTION, SOLUTION INTRAVENOUS; SUBCUTANEOUS PRN
Status: DISCONTINUED | OUTPATIENT
Start: 2023-08-28 | End: 2023-08-28 | Stop reason: SDUPTHER

## 2023-08-28 RX ORDER — OXYCODONE HYDROCHLORIDE AND ACETAMINOPHEN 5; 325 MG/1; MG/1
1 TABLET ORAL EVERY 6 HOURS PRN
Qty: 20 TABLET | Refills: 0 | Status: SHIPPED | OUTPATIENT
Start: 2023-08-28 | End: 2023-09-04

## 2023-08-28 RX ORDER — MIDAZOLAM HYDROCHLORIDE 1 MG/ML
INJECTION INTRAMUSCULAR; INTRAVENOUS PRN
Status: DISCONTINUED | OUTPATIENT
Start: 2023-08-28 | End: 2023-08-28 | Stop reason: SDUPTHER

## 2023-08-28 RX ORDER — ROPIVACAINE HYDROCHLORIDE 5 MG/ML
INJECTION, SOLUTION EPIDURAL; INFILTRATION; PERINEURAL
Status: COMPLETED | OUTPATIENT
Start: 2023-08-28 | End: 2023-08-28

## 2023-08-28 RX ORDER — LIDOCAINE HYDROCHLORIDE AND EPINEPHRINE 10; 10 MG/ML; UG/ML
INJECTION, SOLUTION INFILTRATION; PERINEURAL PRN
Status: DISCONTINUED | OUTPATIENT
Start: 2023-08-28 | End: 2023-08-28 | Stop reason: ALTCHOICE

## 2023-08-28 RX ORDER — SODIUM CHLORIDE 0.9 % (FLUSH) 0.9 %
5-40 SYRINGE (ML) INJECTION PRN
Status: DISCONTINUED | OUTPATIENT
Start: 2023-08-28 | End: 2023-08-29 | Stop reason: HOSPADM

## 2023-08-28 RX ORDER — PROCHLORPERAZINE EDISYLATE 5 MG/ML
5 INJECTION INTRAMUSCULAR; INTRAVENOUS
Status: DISCONTINUED | OUTPATIENT
Start: 2023-08-28 | End: 2023-08-29 | Stop reason: HOSPADM

## 2023-08-28 RX ORDER — OXYCODONE HYDROCHLORIDE 5 MG/1
5 TABLET ORAL PRN
Status: DISCONTINUED | OUTPATIENT
Start: 2023-08-28 | End: 2023-08-28 | Stop reason: HOSPADM

## 2023-08-28 RX ORDER — 0.9 % SODIUM CHLORIDE 0.9 %
INTRAVENOUS SOLUTION INTRAVENOUS PRN
Status: DISCONTINUED | OUTPATIENT
Start: 2023-08-28 | End: 2023-08-28 | Stop reason: SDUPTHER

## 2023-08-28 RX ORDER — MIDAZOLAM HYDROCHLORIDE 1 MG/ML
2 INJECTION, SOLUTION INTRAMUSCULAR; INTRAVENOUS
Status: DISCONTINUED | OUTPATIENT
Start: 2023-08-28 | End: 2023-08-29 | Stop reason: HOSPADM

## 2023-08-28 RX ORDER — HYDROMORPHONE HYDROCHLORIDE 1 MG/ML
0.5 INJECTION, SOLUTION INTRAMUSCULAR; INTRAVENOUS; SUBCUTANEOUS EVERY 5 MIN PRN
Status: DISCONTINUED | OUTPATIENT
Start: 2023-08-28 | End: 2023-08-29 | Stop reason: HOSPADM

## 2023-08-28 RX ORDER — FENTANYL CITRATE 50 UG/ML
25 INJECTION, SOLUTION INTRAMUSCULAR; INTRAVENOUS EVERY 5 MIN PRN
Status: DISCONTINUED | OUTPATIENT
Start: 2023-08-28 | End: 2023-08-29 | Stop reason: HOSPADM

## 2023-08-28 RX ORDER — SODIUM CHLORIDE 0.9 % (FLUSH) 0.9 %
5-40 SYRINGE (ML) INJECTION EVERY 12 HOURS SCHEDULED
Status: DISCONTINUED | OUTPATIENT
Start: 2023-08-28 | End: 2023-08-29 | Stop reason: HOSPADM

## 2023-08-28 RX ORDER — ONDANSETRON 2 MG/ML
4 INJECTION INTRAMUSCULAR; INTRAVENOUS ONCE
Status: DISCONTINUED | OUTPATIENT
Start: 2023-08-28 | End: 2023-08-28 | Stop reason: HOSPADM

## 2023-08-28 RX ADMIN — WATER 2000 MG: 1 INJECTION INTRAMUSCULAR; INTRAVENOUS; SUBCUTANEOUS at 10:03

## 2023-08-28 RX ADMIN — HEPARIN SODIUM 1800 UNITS: 1000 INJECTION INTRAVENOUS; SUBCUTANEOUS at 12:58

## 2023-08-28 RX ADMIN — PROTAMINE SULFATE 20 MG: 10 INJECTION, SOLUTION INTRAVENOUS at 11:00

## 2023-08-28 RX ADMIN — HEPARIN SODIUM 5000 UNITS: 5000 INJECTION INTRAVENOUS; SUBCUTANEOUS at 10:27

## 2023-08-28 RX ADMIN — ROPIVACAINE HYDROCHLORIDE 20 ML: 5 INJECTION, SOLUTION EPIDURAL; INFILTRATION; PERINEURAL at 08:36

## 2023-08-28 RX ADMIN — MIDAZOLAM HYDROCHLORIDE 3 MG: 1 INJECTION, SOLUTION INTRAMUSCULAR; INTRAVENOUS at 08:37

## 2023-08-28 RX ADMIN — DEXMEDETOMIDINE HYDROCHLORIDE 4 MCG: 100 INJECTION, SOLUTION, CONCENTRATE INTRAVENOUS at 10:03

## 2023-08-28 RX ADMIN — Medication 3 AMPULE: at 08:22

## 2023-08-28 RX ADMIN — VANCOMYCIN HYDROCHLORIDE 1500 MG: 1.5 INJECTION, POWDER, LYOPHILIZED, FOR SOLUTION INTRAVENOUS at 08:22

## 2023-08-28 RX ADMIN — ACETAMINOPHEN 1000 MG: 500 TABLET ORAL at 08:29

## 2023-08-28 RX ADMIN — LIDOCAINE HYDROCHLORIDE 20 ML: 20 INJECTION, SOLUTION EPIDURAL; INFILTRATION; INTRACAUDAL; PERINEURAL at 08:36

## 2023-08-28 RX ADMIN — DEXMEDETOMIDINE HYDROCHLORIDE 4 MCG: 100 INJECTION, SOLUTION, CONCENTRATE INTRAVENOUS at 10:00

## 2023-08-28 RX ADMIN — SODIUM CHLORIDE 250 ML: 9 INJECTION, SOLUTION INTRAVENOUS at 11:05

## 2023-08-28 RX ADMIN — SODIUM CHLORIDE: 9 INJECTION, SOLUTION INTRAVENOUS at 08:22

## 2023-08-28 NOTE — ANESTHESIA POSTPROCEDURE EVALUATION
Department of Anesthesiology  Postprocedure Note    Patient: Carolynn Ram  MRN: [id number]  YOB: 1955  Date of evaluation: 8/28/2023      Procedure Summary     Date: 08/28/23 Room / Location: [facility] MAIN OR M4 / [facility] MAIN OR    Anesthesia Start: 0950 Anesthesia Stop: 1126    Procedure: LEFT UPPER EXTREMITY ARTERIOVENOUS FISTULA  (MAC W/BLOCK) (Left: Arm Upper) Diagnosis:       End stage renal disease (HCC)      (End stage renal disease ([address]) [N18.6])    Providers: Tracey Crowley MD Responsible Provider: Ashley Vo MD    Anesthesia Type: Regional, MAC ASA Status: 3          Anesthesia Type: Regional, MAC    Sam Phase I: Sam Score: 9    Sam Phase II:        Anesthesia Post Evaluation    Patient location during evaluation: PACU  Patient participation: complete - patient participated  Level of consciousness: awake  Airway patency: patent  Nausea & Vomiting: no vomiting  Complications: no  Cardiovascular status: hemodynamically stable  Respiratory status: acceptable  Hydration status: euvolemic

## 2023-08-28 NOTE — FLOWSHEET NOTE
08/28/23 1339   Handoff   Communication Given Periop Handoff/Relief   Handoff phase Phase I transferring   Handoff Given To [employer] RN   Handoff Received From Frank Bryson RN   Handoff Communication Face to Face; At bedside   Time Handoff Given 5966

## 2023-08-28 NOTE — DISCHARGE INSTRUCTIONS
Patient Discharge Instructions    Branden Cervantes / [id number] : 1955    Admitted 2023 Discharged: 2023     Take Home Medications     {Medication reconciliation information is now added to the patient's AVS automatically when it is printed. There is no need to use this SmartLink in discharge instructions. Highlight this text and delete it to clear this message}       It is important that you take the medication exactly as they are prescribed. Keep your medication in the bottles provided by the pharmacist and keep a list of the medication names, dosages, and times to be taken in your wallet. Do not take other medications without consulting your doctor. What to do at [address] [place] [address]: You may remove dressings and leave open to air    Recommended activity: As Tolerated. No Strenuous activity or heavy lifting    If you experience any of the following symptoms: bleeding, hand numbness/weakness, fevers, chills then please call the office    Follow-up with Dr Abram Arango in 2 weeks [phone] in [address] [place] [address] obtained by :  I understand that if any problems occur once I am at home I am to contact my physician. I understand and acknowledge receipt of the instructions indicated above. Physician's or R.N.'s Signature                                                                  Date/Time                                                                                                                                              Patient or Representative Signature                                                          Date/Time      How to Care for Your Wound After It's Treated With  DERMABOND* Topical Skin Adhesive    DERMABOND* Topical Skin Adhesive (2-octyl cyanoacrylate) is a sterile, liquid skin adhesive  that holds wound edges together.

## 2023-08-28 NOTE — OP NOTE
[facility]  OPERATIVE REPORT     Name: Casey Garcia  MR#: [id number]  : 1955  DATE OF SERVICE: 23      PREOPERATIVE DIAGNOSIS: ESRD     POSTOPERATIVE DIAGNOSIS: ESRD     PROCEDURE PERFORMED:  Creation of left brachiocephalic fistula    SURGEON:  Darinel Solano MD.     ASSISTANT: Krystal    ANESTHESIA:  Axillary/MAC. COMPLICATIONS:  None. SPECIMENS REMOVED:  None. IMPLANTS:  None. ESTIMATED BLOOD LOSS:  minimal     INDICATIONS:  The patient is a 77year old male who was recently started on hemodialysis and needs placement of permanent access. Vein mapping showed adequate left cephalic vein. Risks and benefits of procedure discussed with patient and they wished to proceed. PROCEDURE: After informed consent was obtained, the patient was taken to the operating room. The patient was placed in the supine position. The patient received intravenous sedation. The left arm was prepped and draped in the usual sterile fashion. We made a small transverse incision in the left cubital fossa. The cephalic vein was identified and mobilized. The fascia was incised, and the brachial artery was also identified and mobilized. The brachial artery was free off significant disease. A good pulse was noted. The cephalic vein was mobilized proximally and distally. The brachial artery was mobilized proximally and distally. Heparin was given. The brachial artery was then clamped proximally and distally. The cephalic vein was ligated distally and clamped proximally. Longitudinal arteriotomy was made in brachial artery. We then sewed the vein to the artery in a end-to-side fashion using a running 6-0 Prolene suture. Just prior to completion of the anastomosis, it was flushed, and the anastomosis was then completed. A great thrill was noted. A great thrill remained in the fistula. Hemostasis was secured.  We then closed the wound using interrupted 3-0 vicryl sutures for the fascia

## 2023-08-28 NOTE — H&P
Vascular Surgery History & Physical    Subjective:     Isatu Russell is a 77 y.o.  male with ESRD that needs permanent dialysis access. Past Medical History:   Diagnosis Date    Arthritis     shoulder    At risk for sleep apnea     Cancer ([facility])     CKD (chronic kidney disease) stage 4, GFR 15-29 ml/min ([facility])     DM (diabetes mellitus) ([facility])     Diet controlled    H/O cervical spine surgery     Hemodialysis patient ([facility])     tu/th/sat Dr Nazia Gross kidney specialist    HTN (hypertension)     Hypothyroid     Murmur     Prostate cancer ([address] [place] [address]) 2015    radiation      Past Surgical History:   Procedure Laterality Date    DIALYSIS CATHETER INSERTION      peritoneal    IR TUNNELED CATHETER PLACEMENT GREATER THAN 5 YEARS  5/1/2023    IR TUNNELED CATHETER PLACEMENT GREATER THAN 5 YEARS  05/01/2023    IR TUNNELED CATHETER PLACEMENT GREATER THAN 5 YEARS 5/1/2023 SSR RAD ANGIO IR    ORTHOPEDIC SURGERY  2020    Cervical Spine Surgery     Family History   Problem Relation Age of Onset    Pacemaker Sister     Coronary Art Dis Mother     Heart Failure Mother       Social History     Tobacco Use    Smoking status: Never    Smokeless tobacco: Never   Substance Use Topics    Alcohol use: Not Currently     Comment: rarely - beer       Prior to Admission medications    Medication Sig Start Date End Date Taking?  Authorizing Provider   allopurinol (ZYLOPRIM) 100 MG tablet Take 1.5 tablets by mouth daily    Ar Automatic Reconciliation   amLODIPine (NORVASC) 10 MG tablet Take 1 tablet by mouth daily    Ar Automatic Reconciliation   atenolol (TENORMIN) 100 MG tablet Take 0.5 tablets by mouth daily    Ar Automatic Reconciliation   calcitRIOL (ROCALTROL) 0.25 MCG capsule Take 1 capsule by mouth daily    Ar Automatic Reconciliation   famotidine (PEPCID) 20 MG tablet Take 2 tablets by mouth 2 times daily    Ar Automatic Reconciliation   ferrous sulfate (IRON 325) 325 (65 Fe) MG tablet Take by mouth 3 times daily (with

## 2023-08-28 NOTE — ANESTHESIA PROCEDURE NOTES
Peripheral Block    Patient location during procedure: pre-op  Reason for block: primary anesthetic  Start time: 8/28/2023 8:36 AM  End time: 8/28/2023 8:46 AM  Staffing  Performed: anesthesiologist   Anesthesiologist: Ethel Gaytan MD  Preanesthetic Checklist  Completed: patient identified, IV checked, site marked, risks and benefits discussed, surgical/procedural consents, equipment checked, pre-op evaluation, timeout performed, anesthesia consent given, oxygen available, monitors applied/VS acknowledged, fire risk safety assessment completed and verbalized and blood product R/B/A discussed and consented  Peripheral Block   Patient position: supine  Prep: ChloraPrep  Provider prep: sterile gloves and mask  Patient monitoring: cardiac monitor, continuous pulse ox, frequent blood pressure checks, IV access and oxygen  Block type: Brachial plexus  Supraclavicular  Laterality: left  Injection technique: single-shot  Guidance: nerve stimulator    Needle   Needle localization: nerve stimulator  Assessment   Injection assessment: negative aspiration for heme and no paresthesia on injection  Slow fractionated injection: yes  Hemodynamics: stable  Outcomes: uncomplicated and patient tolerated procedure well    Medications Administered  lidocaine 2 % (PF) injection - Perineural   20 mL - 8/28/2023 8:36:00 AM  ropivacaine (NAROPIN) injection 0.5% - Perineural   20 mL - 8/28/2023 8:36:00 AM

## 2023-08-28 NOTE — FLOWSHEET NOTE
08/28/23 1310   Handoff   Communication Given Periop Handoff/Relief   Handoff phase Phase I relief   Handoff Given To Nir Mahmood RN   Handoff Received From Iveth Felix   Handoff Communication Face to Face; At bedside   Time Handoff Given 1310

## 2023-10-23 NOTE — PROGRESS NOTES
Cardiology notes from 7/2023 on chart - reviewed notes and EKG done 6/2023 w/TRAVIS Horn NP - no additional testing needed.

## 2023-10-25 ENCOUNTER — ANESTHESIA (OUTPATIENT)
Facility: HOSPITAL | Age: 68
End: 2023-10-25
Payer: MEDICARE

## 2023-10-25 ENCOUNTER — ANESTHESIA EVENT (OUTPATIENT)
Facility: HOSPITAL | Age: 68
End: 2023-10-25
Payer: MEDICARE

## 2023-10-25 ENCOUNTER — HOSPITAL ENCOUNTER (OUTPATIENT)
Facility: HOSPITAL | Age: 68
Setting detail: OUTPATIENT SURGERY
Discharge: HOME | End: 2023-10-25
Attending: SURGERY | Admitting: SURGERY
Payer: MEDICARE

## 2023-10-25 VITALS
SYSTOLIC BLOOD PRESSURE: 135 MMHG | BODY MASS INDEX: 40.31 KG/M2 | HEIGHT: 67 IN | HEART RATE: 72 BPM | DIASTOLIC BLOOD PRESSURE: 68 MMHG | TEMPERATURE: 97.5 F | OXYGEN SATURATION: 93 % | WEIGHT: 256.84 LBS | RESPIRATION RATE: 19 BRPM

## 2023-10-25 DIAGNOSIS — N18.6 ESRD (END STAGE RENAL DISEASE) (HCC): Primary | ICD-10-CM

## 2023-10-25 LAB
ANION GAP BLD CALC-SCNC: 10.7 MMOL/L (ref 10–20)
CA-I BLD-MCNC: 1.14 MMOL/L (ref 1.12–1.32)
CHLORIDE BLD-SCNC: 102 MMOL/L (ref 98–107)
CO2 BLD-SCNC: 26.3 MMOL/L (ref 21–32)
CREAT BLD-MCNC: 4.98 MG/DL (ref 0.6–1.3)
GLUCOSE BLD STRIP.AUTO-MCNC: 99 MG/DL (ref 65–117)
GLUCOSE BLD-MCNC: 101 MG/DL (ref 65–100)
POTASSIUM BLD-SCNC: 4.2 MMOL/L (ref 3.5–5.1)
SERVICE CMNT-IMP: ABNORMAL
SERVICE CMNT-IMP: NORMAL
SODIUM BLD-SCNC: 139 MMOL/L (ref 136–145)

## 2023-10-25 PROCEDURE — 3600000012 HC SURGERY LEVEL 2 ADDTL 15MIN: Performed by: SURGERY

## 2023-10-25 PROCEDURE — 2720000010 HC SURG SUPPLY STERILE: Performed by: SURGERY

## 2023-10-25 PROCEDURE — 2580000003 HC RX 258: Performed by: STUDENT IN AN ORGANIZED HEALTH CARE EDUCATION/TRAINING PROGRAM

## 2023-10-25 PROCEDURE — 3700000000 HC ANESTHESIA ATTENDED CARE: Performed by: SURGERY

## 2023-10-25 PROCEDURE — 2709999900 HC NON-CHARGEABLE SUPPLY: Performed by: SURGERY

## 2023-10-25 PROCEDURE — 3700000001 HC ADD 15 MINUTES (ANESTHESIA): Performed by: SURGERY

## 2023-10-25 PROCEDURE — 6360000002 HC RX W HCPCS: Performed by: SURGERY

## 2023-10-25 PROCEDURE — 3600000002 HC SURGERY LEVEL 2 BASE: Performed by: SURGERY

## 2023-10-25 PROCEDURE — 7100000000 HC PACU RECOVERY - FIRST 15 MIN: Performed by: SURGERY

## 2023-10-25 PROCEDURE — 2500000003 HC RX 250 WO HCPCS: Performed by: STUDENT IN AN ORGANIZED HEALTH CARE EDUCATION/TRAINING PROGRAM

## 2023-10-25 PROCEDURE — A4217 STERILE WATER/SALINE, 500 ML: HCPCS | Performed by: SURGERY

## 2023-10-25 PROCEDURE — 6360000002 HC RX W HCPCS: Performed by: STUDENT IN AN ORGANIZED HEALTH CARE EDUCATION/TRAINING PROGRAM

## 2023-10-25 PROCEDURE — 2500000003 HC RX 250 WO HCPCS

## 2023-10-25 PROCEDURE — 2580000003 HC RX 258: Performed by: SURGERY

## 2023-10-25 PROCEDURE — 7100000001 HC PACU RECOVERY - ADDTL 15 MIN: Performed by: SURGERY

## 2023-10-25 PROCEDURE — C1769 GUIDE WIRE: HCPCS | Performed by: SURGERY

## 2023-10-25 PROCEDURE — 64415 NJX AA&/STRD BRCH PLXS IMG: CPT | Performed by: ANESTHESIOLOGY

## 2023-10-25 PROCEDURE — C1725 CATH, TRANSLUMIN NON-LASER: HCPCS | Performed by: SURGERY

## 2023-10-25 PROCEDURE — C1894 INTRO/SHEATH, NON-LASER: HCPCS | Performed by: SURGERY

## 2023-10-25 PROCEDURE — 82962 GLUCOSE BLOOD TEST: CPT

## 2023-10-25 PROCEDURE — 2500000003 HC RX 250 WO HCPCS: Performed by: SURGERY

## 2023-10-25 PROCEDURE — 80047 BASIC METABLC PNL IONIZED CA: CPT

## 2023-10-25 RX ORDER — EPHEDRINE SULFATE/0.9% NACL/PF 50 MG/5 ML
SYRINGE (ML) INTRAVENOUS PRN
Status: DISCONTINUED | OUTPATIENT
Start: 2023-10-25 | End: 2023-10-25 | Stop reason: SDUPTHER

## 2023-10-25 RX ORDER — ONDANSETRON 2 MG/ML
4 INJECTION INTRAMUSCULAR; INTRAVENOUS
Status: DISCONTINUED | OUTPATIENT
Start: 2023-10-25 | End: 2023-10-25 | Stop reason: HOSPADM

## 2023-10-25 RX ORDER — SODIUM CHLORIDE 0.9 % (FLUSH) 0.9 %
5-40 SYRINGE (ML) INJECTION PRN
Status: DISCONTINUED | OUTPATIENT
Start: 2023-10-25 | End: 2023-10-25 | Stop reason: HOSPADM

## 2023-10-25 RX ORDER — SODIUM CHLORIDE 9 MG/ML
INJECTION, SOLUTION INTRAVENOUS PRN
Status: DISCONTINUED | OUTPATIENT
Start: 2023-10-25 | End: 2023-10-25 | Stop reason: HOSPADM

## 2023-10-25 RX ORDER — LIDOCAINE HYDROCHLORIDE 20 MG/ML
INJECTION, SOLUTION EPIDURAL; INFILTRATION; INTRACAUDAL; PERINEURAL PRN
Status: DISCONTINUED | OUTPATIENT
Start: 2023-10-25 | End: 2023-10-25 | Stop reason: SDUPTHER

## 2023-10-25 RX ORDER — HYDROMORPHONE HYDROCHLORIDE 1 MG/ML
0.5 INJECTION, SOLUTION INTRAMUSCULAR; INTRAVENOUS; SUBCUTANEOUS EVERY 5 MIN PRN
Status: DISCONTINUED | OUTPATIENT
Start: 2023-10-25 | End: 2023-10-25 | Stop reason: HOSPADM

## 2023-10-25 RX ORDER — LIDOCAINE HYDROCHLORIDE 10 MG/ML
INJECTION, SOLUTION EPIDURAL; INFILTRATION; INTRACAUDAL; PERINEURAL PRN
Status: DISCONTINUED | OUTPATIENT
Start: 2023-10-25 | End: 2023-10-25 | Stop reason: ALTCHOICE

## 2023-10-25 RX ORDER — DEXTROSE MONOHYDRATE 100 MG/ML
INJECTION, SOLUTION INTRAVENOUS CONTINUOUS PRN
Status: DISCONTINUED | OUTPATIENT
Start: 2023-10-25 | End: 2023-10-25 | Stop reason: HOSPADM

## 2023-10-25 RX ORDER — SODIUM CHLORIDE 9 MG/ML
INJECTION, SOLUTION INTRAVENOUS ONCE
Status: COMPLETED | OUTPATIENT
Start: 2023-10-25 | End: 2023-10-25

## 2023-10-25 RX ORDER — FENTANYL CITRATE 50 UG/ML
25 INJECTION, SOLUTION INTRAMUSCULAR; INTRAVENOUS EVERY 5 MIN PRN
Status: DISCONTINUED | OUTPATIENT
Start: 2023-10-25 | End: 2023-10-25 | Stop reason: HOSPADM

## 2023-10-25 RX ORDER — SODIUM CHLORIDE 0.9 % (FLUSH) 0.9 %
5-40 SYRINGE (ML) INJECTION EVERY 12 HOURS SCHEDULED
Status: DISCONTINUED | OUTPATIENT
Start: 2023-10-25 | End: 2023-10-25 | Stop reason: HOSPADM

## 2023-10-25 RX ORDER — OXYCODONE HYDROCHLORIDE AND ACETAMINOPHEN 5; 325 MG/1; MG/1
1 TABLET ORAL EVERY 6 HOURS PRN
Qty: 25 TABLET | Refills: 0 | Status: SHIPPED | OUTPATIENT
Start: 2023-10-25 | End: 2023-11-01

## 2023-10-25 RX ADMIN — VANCOMYCIN HYDROCHLORIDE 1500 MG: 1.5 INJECTION, POWDER, LYOPHILIZED, FOR SOLUTION INTRAVENOUS at 11:54

## 2023-10-25 RX ADMIN — Medication 10 MG: at 13:18

## 2023-10-25 RX ADMIN — PROPOFOL 10 MG: 10 INJECTION, EMULSION INTRAVENOUS at 12:06

## 2023-10-25 RX ADMIN — MEPIVACAINE HYDROCHLORIDE 400 MG: 20 INJECTION, SOLUTION EPIDURAL; INFILTRATION at 11:25

## 2023-10-25 RX ADMIN — LIDOCAINE HYDROCHLORIDE 100 MG: 20 INJECTION, SOLUTION EPIDURAL; INFILTRATION; INTRACAUDAL; PERINEURAL at 11:54

## 2023-10-25 RX ADMIN — WATER 2000 MG: 1 INJECTION INTRAMUSCULAR; INTRAVENOUS; SUBCUTANEOUS at 12:01

## 2023-10-25 RX ADMIN — PROPOFOL 20 MG: 10 INJECTION, EMULSION INTRAVENOUS at 12:30

## 2023-10-25 RX ADMIN — PROPOFOL 20 MG: 10 INJECTION, EMULSION INTRAVENOUS at 11:54

## 2023-10-25 RX ADMIN — SODIUM CHLORIDE: 9 INJECTION, SOLUTION INTRAVENOUS at 11:50

## 2023-10-25 RX ADMIN — LIDOCAINE HYDROCHLORIDE 10 ML: 20 INJECTION, SOLUTION EPIDURAL; INFILTRATION; INTRACAUDAL; PERINEURAL at 11:27

## 2023-10-25 RX ADMIN — PROPOFOL 100 MCG/KG/MIN: 10 INJECTION, EMULSION INTRAVENOUS at 11:55

## 2023-10-25 RX ADMIN — PROPOFOL 20 MG: 10 INJECTION, EMULSION INTRAVENOUS at 11:20

## 2023-10-25 NOTE — DISCHARGE INSTRUCTIONS
Patient Discharge Instructions    Charly Pimentel / [id number] : 1955    Admitted 10/25/2023 Discharged: 10/25/2023     Take Home Medications     {Medication reconciliation information is now added to the patient's AVS automatically when it is printed. There is no need to use this SmartLink in discharge instructions. Highlight this text and delete it to clear this message}       It is important that you take the medication exactly as they are prescribed. Keep your medication in the bottles provided by the pharmacist and keep a list of the medication names, dosages, and times to be taken in your wallet. Do not take other medications without consulting your doctor. What to do at [address] [place] [address]: You may remove dressings on 10/27 and leave open to air    Recommended diet: AHA    Recommended activity: As Tolerated.  No Strenuous activity or heavy lifting    If you experience any of the following symptoms: bleeding, hand numbness/weakness, fevers, chills then please call the office    Follow-up with Dr Emigdio Servin in 2 weeks [facility] Begin from Nurse    PATIENT INSTRUCTIONS:    After general anesthesia or intravenous sedation, for 24 hours or while taking prescription narcotics:    Have someone responsible help you with your care  Limit your activities  Do not drive and operate hazardous machinery  Do not make important personal, legal or business decisions  Do not drink alcoholic beverages  If you have not urinated within 8 hours after discharge, please contact your surgeon on call  Resume your medications unless otherwise instructed    From general anesthesia, intravenous sedation, or while taking prescription narcotics, you may experience:    Drowsiness, dizziness, sleepiness, or confusion  Difficulty remembering or delayed reaction times  Difficulty with your balance, especially while walking, move slowly and carefully, do not make sudden position changes  Difficulty

## 2023-10-25 NOTE — FLOWSHEET NOTE
10/25/23 1340   Handoff   Communication Given Periop Handoff/Relief   Handoff phase Phase I receiving   Handoff Given To Aria Noyola RN/[employer] Received From Emani Strange RN/[facility]   Handoff Communication Face to Face; At bedside   Time Handoff Given [phone]

## 2023-10-25 NOTE — PERIOP NOTE
Irrisept Wound Debridement and Cleansing System  Ref: Tess Yates: [id number] LOT: [id number] Expiration Date: 06/30/2026

## 2023-10-25 NOTE — ANESTHESIA PROCEDURE NOTES
Peripheral Block    Patient location during procedure: holding area  Reason for block: post-op pain management, primary anesthetic and at surgeon's request  Start time: 10/25/2023 11:20 AM  End time: 10/25/2023 11:30 AM  Staffing  Performed: anesthesiologist   Anesthesiologist: Ardie Castleman, MD  Performed by: Ardie Castleman, MD  Authorized by: Ardie Castleman, MD    Preanesthetic Checklist  Completed: patient identified, IV checked, site marked, risks and benefits discussed, surgical/procedural consents, equipment checked, pre-op evaluation, timeout performed, anesthesia consent given, oxygen available, monitors applied/VS acknowledged, fire risk safety assessment completed and verbalized and blood product R/B/A discussed and consented  Peripheral Block   Patient position: sitting  Prep: ChloraPrep  Provider prep: mask and sterile gloves  Patient monitoring: cardiac monitor, continuous pulse ox, continuous capnometry, frequent blood pressure checks, IV access, oxygen and responsive to questions  Block type: Brachial plexus  Supraclavicular  Laterality: left  Injection technique: single-shot  Guidance: ultrasound guided    Needle   Needle type: insulated echogenic nerve stimulator needle   Needle gauge: 20 G  Needle localization: ultrasound guidance  Needle length: 10 cm  Assessment   Injection assessment: negative aspiration for heme, no paresthesia on injection, local visualized surrounding nerve on ultrasound and no intravascular symptoms  Paresthesia pain: immediately resolved  Slow fractionated injection: yes  Hemodynamics: stable  Outcomes: patient tolerated procedure well and uncomplicated    Additional Notes  With 10cc of 2%lidocaine for intercostobrachial block

## 2023-10-25 NOTE — CARE COORDINATION
5:47 PM UPDATE  Received call from [address] [place] [address] Drive who reports that [place] [employer] transport has accepted request and will pick pt up from surgery center entrance at 6:15 PM. PACU called and updated on ETA. Initial note-  Transportation has been coordinated with pt's [employer] Medicare transportation benefit through [address] [place] [address] Drive: [phone] for reservation line    Reservation #: [id number] 3-4 Hours ETA for new trip. Please call [address] [place] [address] [employer] at [phone] to check on status of ride if there are any questions. If utilization of alternative transport is needed, please escalate to RN Supervisor for approval and coordination.        Marjorie Rocha, [address], [facility]  [phone]/Available on [employer]

## 2023-10-25 NOTE — ANESTHESIA POSTPROCEDURE EVALUATION
Department of Anesthesiology  Postprocedure Note    Patient: Anne Bedolla  MRN: [id number]  YOB: 1955  Date of evaluation: 10/25/2023      Procedure Summary     Date: 10/25/23 Room / Location: [facility] MAIN OR M3 / [facility] MAIN OR    Anesthesia Start: 1146 Anesthesia Stop: 3797    Procedure: LEFT ARM BRACHIO CEPHALIC TRANSPOSITION FISTULA WITH ANGIOPLASTY, REMOVAL PERITONEAL DIALYSIS CATHETER (Left: Arm Upper) Diagnosis:       End stage renal disease ([address])      (End stage renal disease ([address]) [N18.6])    Providers:  Denise Chow MD Responsible Provider: Heriberto Arroyo MD    Anesthesia Type: Regional, MAC ASA Status: 3          Anesthesia Type: Regional, MAC    Sam Phase I: Sam Score: 8    Sam Phase II:        Anesthesia Post Evaluation    Patient location during evaluation: PACU  Patient participation: complete - patient participated  Level of consciousness: sleepy but conscious and responsive to verbal stimuli  Airway patency: patent  Nausea & Vomiting: no vomiting and no nausea  Complications: no  Cardiovascular status: blood pressure returned to baseline and hemodynamically stable  Respiratory status: acceptable  Hydration status: stable

## 2023-10-25 NOTE — H&P
Vascular Surgery History & Physical    Subjective:     Jose Pedroza is a 77 y.o.  male presenting for left arm fistula revision and pd catheter removal.     Past Medical History:   Diagnosis Date    Arthritis     shoulder    At risk for sleep apnea 10/20/2023    LAYLA 5 - recommendation faxed to PCP    Cancer [facility])     CKD (chronic kidney disease) stage 4, GFR 15-29 ml/min ([facility])     DM (diabetes mellitus) ([address])     Diet controlled    H/O cervical spine surgery     Hemodialysis patient ([address])     tu/th/sat Dr Rebekah Hernandez kidney specialist    HTN (hypertension)     Hypothyroid     Murmur     Prostate cancer ([address]) 2015    radiation      Past Surgical History:   Procedure Laterality Date    CARDIAC CATHETERIZATION  2009    [facility] - Dr. Andrés Miller      peritoneal    [place] Left 08/28/2023    LEFT UPPER EXTREMITY ARTERIOVENOUS FISTULA  (MAC W/BLOCK) performed by Kaushal Hernandez MD at hospitals MAIN OR    IR TUNNELED CATHETER PLACEMENT GREATER THAN 5 YEARS  5/1/2023    IR TUNNELED CATHETER PLACEMENT GREATER THAN 5 YEARS  05/01/2023    IR TUNNELED CATHETER PLACEMENT GREATER THAN 5 YEARS 5/1/2023 SSR RAD ANGIO IR    ORTHOPEDIC SURGERY  2020    Cervical Spine Surgery     Family History   Problem Relation Age of Onset    Coronary Art Dis Mother     Heart Failure Mother     Pacemaker Sister       Social History     Tobacco Use    Smoking status: Never    Smokeless tobacco: Never   Substance Use Topics    Alcohol use: Not Currently     Comment: rarely - beer       Prior to Admission medications    Medication Sig Start Date End Date Taking?  Authorizing Provider   allopurinol (ZYLOPRIM) 100 MG tablet Take 1.5 tablets by mouth daily    Automatic Reconciliation, Ar   amLODIPine (NORVASC) 10 MG tablet Take 1 tablet by mouth daily    Automatic Reconciliation, Ar   atenolol (TENORMIN) 100 MG tablet Take 0.5 tablets by mouth daily    Automatic Reconciliation, Ar   calcitRIOL

## 2023-10-30 ENCOUNTER — HOSPITAL ENCOUNTER (EMERGENCY)
Facility: HOSPITAL | Age: 68
Discharge: HOME | End: 2023-10-30
Attending: EMERGENCY MEDICINE
Payer: MEDICARE

## 2023-10-30 ENCOUNTER — APPOINTMENT (OUTPATIENT)
Facility: HOSPITAL | Age: 68
End: 2023-10-30
Payer: MEDICARE

## 2023-10-30 VITALS
SYSTOLIC BLOOD PRESSURE: 139 MMHG | HEIGHT: 67 IN | BODY MASS INDEX: 40.81 KG/M2 | HEART RATE: 75 BPM | OXYGEN SATURATION: 100 % | RESPIRATION RATE: 17 BRPM | WEIGHT: 260 LBS | TEMPERATURE: 97.5 F | DIASTOLIC BLOOD PRESSURE: 82 MMHG

## 2023-10-30 DIAGNOSIS — S01.81XA FACIAL LACERATION, INITIAL ENCOUNTER: ICD-10-CM
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
## 2023-10-30 PROCEDURE — 99284 EMERGENCY DEPT VISIT MOD MDM: CPT

## 2023-10-30 PROCEDURE — 70486 CT MAXILLOFACIAL W/O DYE: CPT

## 2023-10-30 PROCEDURE — 12011 RPR F/E/E/N/L/M 2.5 CM/<: CPT

## 2023-10-30 PROCEDURE — 93005 ELECTROCARDIOGRAM TRACING: CPT | Performed by: EMERGENCY MEDICINE

## 2023-10-30 ASSESSMENT — PAIN - FUNCTIONAL ASSESSMENT
PAIN_FUNCTIONAL_ASSESSMENT: NONE - DENIES PAIN

## 2023-10-30 NOTE — ED PROVIDER NOTES
known as: SYNTHROID     oxyCODONE-acetaminophen 5-325 MG per tablet  Commonly known as: Percocet  Take 1 tablet by mouth every 6 hours as needed for Pain for up to 7 days. Intended supply: 7 days. Take lowest dose possible to manage pain Max Daily Amount: 4 tablets     sodium bicarbonate 650 MG tablet                DISCONTINUED MEDICATIONS:  Current Discharge Medication List          I am the Primary Clinician of Record. Wendie Cordon MD (electronically signed)    (Please note that parts of this dictation were completed with voice recognition software. Quite often unanticipated grammatical, syntax, homophones, and other interpretive errors are inadvertently transcribed by the computer software. Please disregards these errors.  Please excuse any errors that have escaped final proofreading.)     Wendie Cordon MD  10/30/23 2210       Wendie Cordon MD  10/30/23 3503

## 2023-10-30 NOTE — ED TRIAGE NOTES
Was getting  up off rolator and  tripped on the rolator and fell. Hit face on wall. No loc. No blood thinners. Pt has no pain or headache. Lac noted to bridge on nose.  Pt just wasts checked out

## 2023-10-31 LAB
EKG ATRIAL RATE: 70 BPM
EKG DIAGNOSIS: NORMAL
EKG P AXIS: 44 DEGREES
EKG P-R INTERVAL: 186 MS
EKG Q-T INTERVAL: 394 MS
EKG QRS DURATION: 82 MS
EKG QTC CALCULATION (BAZETT): 425 MS
EKG R AXIS: 39 DEGREES
EKG T AXIS: 52 DEGREES
EKG VENTRICULAR RATE: 70 BPM

## 2023-10-31 NOTE — DISCHARGE INSTRUCTIONS
Thank you! Thank you for allowing me to care for you in the emergency department. It is my goal to provide you with excellent care. If you have not received excellent quality care, please ask to speak to the nurse manager. Please fill out the survey that will come to you by mail or email since we listen to your feedback! Below you will find a list of your tests from today's visit. Should you have any questions, please do not hesitate to call the emergency department. Labs  No results found for this or any previous visit (from the past 12 hour(s)). Radiologic Studies  CT MAXILLOFACIAL WO CONTRAST   Final Result   1. No fracture. 2. Small left parotid mass is amenable to ultrasound-guided percutaneous   aspiration.        ------------------------------------------------------------------------------------------------------------  The exam and treatment you received in the Emergency Department were for an urgent problem and are not intended as complete care. It is important that you follow-up with a doctor, nurse practitioner, or physician assistant to:  (1) confirm your diagnosis,  (2) re-evaluation of changes in your illness and treatment, and  (3) for ongoing care. Please take your discharge instructions with you when you go to your follow-up appointment. If you have any problem arranging a follow-up appointment, contact the Emergency Department. If your symptoms become worse or you do not improve as expected and you are unable to reach your health care provider, please return to the Emergency Department. We are available 24 hours a day. If a prescription has been provided, please have it filled as soon as possible to prevent a delay in treatment. If you have any questions or reservations about taking the medication due to side effects or interactions with other medications, please call your primary care provider or contact the ER.

## 2024-09-26 ENCOUNTER — APPOINTMENT (OUTPATIENT)
Facility: HOSPITAL | Age: 69
End: 2024-09-26
Payer: MEDICARE

## 2024-09-26 ENCOUNTER — HOSPITAL ENCOUNTER (EMERGENCY)
Facility: HOSPITAL | Age: 69
Discharge: HOME | End: 2024-09-26
Attending: EMERGENCY MEDICINE
Payer: MEDICARE

## 2024-09-26 VITALS
HEART RATE: 74 BPM | RESPIRATION RATE: 17 BRPM | DIASTOLIC BLOOD PRESSURE: 65 MMHG | SYSTOLIC BLOOD PRESSURE: 123 MMHG | OXYGEN SATURATION: 98 % | TEMPERATURE: 97.8 F | WEIGHT: 268.96 LBS | BODY MASS INDEX: 42.21 KG/M2 | HEIGHT: 67 IN

## 2024-09-26 DIAGNOSIS — R19.7 DIARRHEA, UNSPECIFIED TYPE: Primary | ICD-10-CM

## 2024-09-26 LAB
ALBUMIN SERPL-MCNC: 3.5 G/DL (ref 3.5–5)
ALBUMIN/GLOB SERPL: 0.8 (ref 1.1–2.2)
ALP SERPL-CCNC: 128 U/L (ref 45–117)
ALT SERPL-CCNC: 18 U/L (ref 12–78)
ANION GAP SERPL CALC-SCNC: 15 MMOL/L (ref 2–12)
AST SERPL W P-5'-P-CCNC: 14 U/L (ref 15–37)
BASOPHILS # BLD: 0 K/UL (ref 0–0.1)
BASOPHILS NFR BLD: 0 % (ref 0–1)
BILIRUB SERPL-MCNC: 0.4 MG/DL (ref 0.2–1)
BUN SERPL-MCNC: 49 MG/DL (ref 6–20)
BUN/CREAT SERPL: 5 (ref 12–20)
CA-I BLD-MCNC: 9.1 MG/DL (ref 8.5–10.1)
CHLORIDE SERPL-SCNC: 96 MMOL/L (ref 97–108)
CO2 SERPL-SCNC: 27 MMOL/L (ref 21–32)
CREAT SERPL-MCNC: 9.4 MG/DL (ref 0.7–1.3)
DIFFERENTIAL METHOD BLD: ABNORMAL
EOSINOPHIL # BLD: 0.2 K/UL (ref 0–0.4)
EOSINOPHIL NFR BLD: 2 % (ref 0–7)
ERYTHROCYTE [DISTWIDTH] IN BLOOD BY AUTOMATED COUNT: 14.8 % (ref 11.5–14.5)
GLOBULIN SER CALC-MCNC: 4.2 G/DL (ref 2–4)
GLUCOSE SERPL-MCNC: 93 MG/DL (ref 65–100)
HCT VFR BLD AUTO: 28.1 % (ref 36.6–50.3)
HGB BLD-MCNC: 9.4 G/DL (ref 12.1–17)
IMM GRANULOCYTES # BLD AUTO: 0 K/UL (ref 0–0.04)
IMM GRANULOCYTES NFR BLD AUTO: 1 % (ref 0–0.5)
LYMPHOCYTES # BLD: 0.8 K/UL (ref 0.8–3.5)
LYMPHOCYTES NFR BLD: 11 % (ref 12–49)
MCH RBC QN AUTO: 31.1 PG (ref 26–34)
MCHC RBC AUTO-ENTMCNC: 33.5 G/DL (ref 30–36.5)
MCV RBC AUTO: 93 FL (ref 80–99)
MONOCYTES # BLD: 0.8 K/UL (ref 0–1)
MONOCYTES NFR BLD: 11 % (ref 5–13)
NEUTS SEG # BLD: 5.1 K/UL (ref 1.8–8)
NEUTS SEG NFR BLD: 75 % (ref 32–75)
NRBC # BLD: 0 K/UL (ref 0–0.01)
NRBC BLD-RTO: 0 PER 100 WBC
PLATELET # BLD AUTO: 163 K/UL (ref 150–400)
PMV BLD AUTO: 8.8 FL (ref 8.9–12.9)
POTASSIUM SERPL-SCNC: 4.8 MMOL/L (ref 3.5–5.1)
PROCALCITONIN SERPL-MCNC: 0.3 NG/ML
PROT SERPL-MCNC: 7.7 G/DL (ref 6.4–8.2)
RBC # BLD AUTO: 3.02 M/UL (ref 4.1–5.7)
SODIUM SERPL-SCNC: 138 MMOL/L (ref 136–145)
TROPONIN I SERPL HS-MCNC: 7 NG/L (ref 0–76)
WBC # BLD AUTO: 6.9 K/UL (ref 4.1–11.1)

## 2024-09-26 PROCEDURE — 93005 ELECTROCARDIOGRAM TRACING: CPT | Performed by: EMERGENCY MEDICINE

## 2024-09-26 PROCEDURE — 36415 COLL VENOUS BLD VENIPUNCTURE: CPT

## 2024-09-26 PROCEDURE — 85025 COMPLETE CBC W/AUTO DIFF WBC: CPT

## 2024-09-26 PROCEDURE — 87340 HEPATITIS B SURFACE AG IA: CPT

## 2024-09-26 PROCEDURE — 80053 COMPREHEN METABOLIC PANEL: CPT

## 2024-09-26 PROCEDURE — 84484 ASSAY OF TROPONIN QUANT: CPT

## 2024-09-26 PROCEDURE — 2709999900 HC NON-CHARGEABLE SUPPLY

## 2024-09-26 PROCEDURE — 99285 EMERGENCY DEPT VISIT HI MDM: CPT

## 2024-09-26 PROCEDURE — 87040 BLOOD CULTURE FOR BACTERIA: CPT

## 2024-09-26 PROCEDURE — 84145 PROCALCITONIN (PCT): CPT

## 2024-09-26 PROCEDURE — 90935 HEMODIALYSIS ONE EVALUATION: CPT

## 2024-09-26 PROCEDURE — 71045 X-RAY EXAM CHEST 1 VIEW: CPT

## 2024-09-26 PROCEDURE — G0257 UNSCHED DIALYSIS ESRD PT HOS: HCPCS

## 2024-09-27 LAB
EKG ATRIAL RATE: 75 BPM
EKG DIAGNOSIS: NORMAL
EKG P AXIS: 43 DEGREES
EKG P-R INTERVAL: 190 MS
EKG Q-T INTERVAL: 412 MS
EKG QRS DURATION: 98 MS
EKG QTC CALCULATION (BAZETT): 460 MS
EKG R AXIS: 28 DEGREES
EKG T AXIS: 41 DEGREES
EKG VENTRICULAR RATE: 75 BPM
HBV SURFACE AG SER QL: <0.1 INDEX
HBV SURFACE AG SER QL: NEGATIVE

## 2024-09-28 LAB
BACTERIA SPEC CULT: NORMAL
Lab: NORMAL

## 2024-09-29 LAB
BACTERIA SPEC CULT: NORMAL
BACTERIA SPEC CULT: NORMAL
Lab: NORMAL

## 2024-09-30 LAB
BACTERIA SPEC CULT: ABNORMAL
BACTERIA SPEC CULT: ABNORMAL
Lab: ABNORMAL

## 2024-10-01 ENCOUNTER — HOSPITAL ENCOUNTER (EMERGENCY)
Facility: HOSPITAL | Age: 69
Discharge: HOME | End: 2024-10-01
Payer: MEDICARE

## 2024-10-01 VITALS
TEMPERATURE: 97.5 F | WEIGHT: 270 LBS | RESPIRATION RATE: 16 BRPM | OXYGEN SATURATION: 98 % | HEIGHT: 67 IN | BODY MASS INDEX: 42.38 KG/M2 | SYSTOLIC BLOOD PRESSURE: 103 MMHG | DIASTOLIC BLOOD PRESSURE: 62 MMHG | HEART RATE: 74 BPM

## 2024-10-01 DIAGNOSIS — T14.8XXA CHRONIC WOUND: Primary | ICD-10-CM

## 2024-10-01 PROCEDURE — 99284 EMERGENCY DEPT VISIT MOD MDM: CPT

## 2024-10-01 ASSESSMENT — LIFESTYLE VARIABLES
HOW MANY STANDARD DRINKS CONTAINING ALCOHOL DO YOU HAVE ON A TYPICAL DAY: PATIENT DOES NOT DRINK
HOW OFTEN DO YOU HAVE A DRINK CONTAINING ALCOHOL: NEVER

## 2024-10-01 ASSESSMENT — PAIN - FUNCTIONAL ASSESSMENT
PAIN_FUNCTIONAL_ASSESSMENT: 0-10
PAIN_FUNCTIONAL_ASSESSMENT: 0-10

## 2024-10-01 ASSESSMENT — PAIN SCALES - GENERAL
PAINLEVEL_OUTOF10: 0
PAINLEVEL_OUTOF10: 0

## 2024-10-01 NOTE — ED NOTES
Discharge instructions reviewed with pt and pt indicated understanding. Pt ambulated out with the assist of a Rolator and all belongings.

## 2024-10-01 NOTE — ED TRIAGE NOTES
Left Leg pain and swelling. Pt stopped seeing wound dr a few years ago and needs something to keep it from  oozing

## 2024-10-01 NOTE — ED NOTES
Pt states wound care nurse rounded on him but states he was not told a plan of treatment. Neither nurse nor MD were notified by wound care. Nurse attempted to call wound care and no answer on that line.

## 2024-10-01 NOTE — WOUND CARE
IP WOUND CONSULT    Mike Auguste  MEDICAL RECORD NUMBER:  [id number]  AGE: 69 y.o.   GENDER: male  : 1955  TODAY'S DATE:  10/1/2024    GENERAL     [] Follow-up   [x] New Consult    Mike Auguste is a 69 y.o. male referred by:   [x] Physician  [] Nursing  [] Other:         PAST MEDICAL HISTORY    Past Medical History:   Diagnosis Date    Arthritis     shoulder    At risk for sleep apnea 10/20/2023    LAYLA 5 - recommendation faxed to PCP    Cancer ([facility])     CKD (chronic kidney disease) stage 4, GFR 15-29 ml/min ([facility])     DM (diabetes mellitus) ([facility])     Diet controlled    H/O cervical spine surgery     Hemodialysis patient ([facility])     //sat Dr DURAND Gladstone kidney specialist    HTN (hypertension)     Hypothyroid     Murmur     Prostate cancer ([facility]) 2015    radiation        PAST SURGICAL HISTORY    Past Surgical History:   Procedure Laterality Date    CARDIAC CATHETERIZATION      [facility] - Dr. Tomlin    DIALYSIS CATHETER INSERTION      peritoneal    DIALYSIS FISTULA CREATION Left 2023    LEFT UPPER EXTREMITY ARTERIOVENOUS FISTULA  (MAC W/BLOCK) performed by Osorio RENO MD at [facility] MAIN OR    IR TUNNELED CATHETER PLACEMENT GREATER THAN 5 YEARS  2023    IR TUNNELED CATHETER PLACEMENT GREATER THAN 5 YEARS  2023    IR TUNNELED CATHETER PLACEMENT GREATER THAN 5 YEARS 2023 SSR RAD ANGIO IR    ORTHOPEDIC SURGERY      Cervical Spine Surgery    VASCULAR SURGERY Left 10/25/2023    LEFT ARM BRACHIO CEPHALIC TRANSPOSITION FISTULA WITH ANGIOPLASTY, REMOVAL PERITONEAL DIALYSIS CATHETER performed by Osorio Santana MD at [facility] MAIN OR       FAMILY HISTORY    Family History   Problem Relation Age of Onset    Coronary Art Dis Mother     Heart Failure Mother     Pacemaker Sister          ALLERGIES    No Known Allergies    MEDICATIONS    No current facility-administered medications on file prior to encounter.     Current Outpatient Medications on File Prior to Encounter   Medication Sig Dispense

## 2024-10-01 NOTE — ED NOTES
Wound care nurse contacted primary nurse to let us know they received consult and will be down to see him as soon as they are able. PT updated on status and given a snack

## 2024-10-02 ENCOUNTER — APPOINTMENT (OUTPATIENT)
Facility: HOSPITAL | Age: 69
End: 2024-10-02
Payer: MEDICARE

## 2024-10-02 ENCOUNTER — HOSPITAL ENCOUNTER (EMERGENCY)
Facility: HOSPITAL | Age: 69
Discharge: HOME | End: 2024-10-02
Attending: EMERGENCY MEDICINE
Payer: MEDICARE

## 2024-10-02 ENCOUNTER — TELEPHONE (OUTPATIENT)
Age: 69
End: 2024-10-02

## 2024-10-02 VITALS
SYSTOLIC BLOOD PRESSURE: 113 MMHG | DIASTOLIC BLOOD PRESSURE: 70 MMHG | WEIGHT: 275 LBS | BODY MASS INDEX: 43.16 KG/M2 | OXYGEN SATURATION: 95 % | TEMPERATURE: 97.9 F | HEART RATE: 78 BPM | HEIGHT: 67 IN | RESPIRATION RATE: 18 BRPM

## 2024-10-02 DIAGNOSIS — R79.89 ELEVATED LACTIC ACID LEVEL: ICD-10-CM

## 2024-10-02 DIAGNOSIS — N28.9 RENAL INSUFFICIENCY: ICD-10-CM

## 2024-10-02 DIAGNOSIS — L03.119 CELLULITIS OF LOWER EXTREMITY, UNSPECIFIED LATERALITY: Primary | ICD-10-CM

## 2024-10-02 LAB
ALBUMIN SERPL-MCNC: 3.2 G/DL (ref 3.5–5)
ALBUMIN/GLOB SERPL: 0.8 (ref 1.1–2.2)
ALP SERPL-CCNC: 101 U/L (ref 45–117)
ALT SERPL-CCNC: 13 U/L (ref 12–78)
ANION GAP SERPL CALC-SCNC: 11 MMOL/L (ref 2–12)
AST SERPL W P-5'-P-CCNC: 16 U/L (ref 15–37)
BACTERIA SPEC CULT: NORMAL
BASOPHILS # BLD: 0 K/UL (ref 0–0.1)
BASOPHILS NFR BLD: 1 % (ref 0–1)
BILIRUB DIRECT SERPL-MCNC: <0.1 MG/DL (ref 0–0.2)
BILIRUB SERPL-MCNC: 0.4 MG/DL (ref 0.2–1)
BNP SERPL-MCNC: 920 PG/ML
BUN SERPL-MCNC: 39 MG/DL (ref 6–20)
BUN/CREAT SERPL: 5 (ref 12–20)
CA-I BLD-MCNC: 9.2 MG/DL (ref 8.5–10.1)
CHLORIDE SERPL-SCNC: 98 MMOL/L (ref 97–108)
CO2 SERPL-SCNC: 28 MMOL/L (ref 21–32)
CREAT SERPL-MCNC: 8.07 MG/DL (ref 0.7–1.3)
CRP SERPL-MCNC: 4.59 MG/DL (ref 0–0.3)
DIFFERENTIAL METHOD BLD: ABNORMAL
EOSINOPHIL # BLD: 0.2 K/UL (ref 0–0.4)
EOSINOPHIL NFR BLD: 4 % (ref 0–7)
ERYTHROCYTE [DISTWIDTH] IN BLOOD BY AUTOMATED COUNT: 15.1 % (ref 11.5–14.5)
ERYTHROCYTE [SEDIMENTATION RATE] IN BLOOD: 60 MM/HR (ref 0–20)
GLOBULIN SER CALC-MCNC: 4 G/DL (ref 2–4)
GLUCOSE SERPL-MCNC: 94 MG/DL (ref 65–100)
HCT VFR BLD AUTO: 31.4 % (ref 36.6–50.3)
HGB BLD-MCNC: 10.6 G/DL (ref 12.1–17)
IMM GRANULOCYTES # BLD AUTO: 0 K/UL (ref 0–0.04)
IMM GRANULOCYTES NFR BLD AUTO: 1 % (ref 0–0.5)
LACTATE SERPL-SCNC: 2.3 MMOL/L (ref 0.4–2)
LYMPHOCYTES # BLD: 0.7 K/UL (ref 0.8–3.5)
LYMPHOCYTES NFR BLD: 11 % (ref 12–49)
Lab: NORMAL
MAGNESIUM SERPL-MCNC: 2.2 MG/DL (ref 1.6–2.4)
MCH RBC QN AUTO: 30.7 PG (ref 26–34)
MCHC RBC AUTO-ENTMCNC: 33.8 G/DL (ref 30–36.5)
MCV RBC AUTO: 91 FL (ref 80–99)
MONOCYTES # BLD: 0.7 K/UL (ref 0–1)
MONOCYTES NFR BLD: 10 % (ref 5–13)
NEUTS SEG # BLD: 4.9 K/UL (ref 1.8–8)
NEUTS SEG NFR BLD: 73 % (ref 32–75)
NRBC # BLD: 0 K/UL (ref 0–0.01)
NRBC BLD-RTO: 0 PER 100 WBC
PLATELET # BLD AUTO: 203 K/UL (ref 150–400)
PMV BLD AUTO: 9.5 FL (ref 8.9–12.9)
POTASSIUM SERPL-SCNC: 5.1 MMOL/L (ref 3.5–5.1)
PROCALCITONIN SERPL-MCNC: 0.27 NG/ML
PROT SERPL-MCNC: 7.2 G/DL (ref 6.4–8.2)
RBC # BLD AUTO: 3.45 M/UL (ref 4.1–5.7)
SODIUM SERPL-SCNC: 137 MMOL/L (ref 136–145)
WBC # BLD AUTO: 6.5 K/UL (ref 4.1–11.1)

## 2024-10-02 PROCEDURE — 80048 BASIC METABOLIC PNL TOTAL CA: CPT

## 2024-10-02 PROCEDURE — 73620 X-RAY EXAM OF FOOT: CPT

## 2024-10-02 PROCEDURE — 83880 ASSAY OF NATRIURETIC PEPTIDE: CPT

## 2024-10-02 PROCEDURE — 6360000002 HC RX W HCPCS: Performed by: EMERGENCY MEDICINE

## 2024-10-02 PROCEDURE — 85025 COMPLETE CBC W/AUTO DIFF WBC: CPT

## 2024-10-02 PROCEDURE — 2580000003 HC RX 258: Performed by: EMERGENCY MEDICINE

## 2024-10-02 PROCEDURE — 86140 C-REACTIVE PROTEIN: CPT

## 2024-10-02 PROCEDURE — 83605 ASSAY OF LACTIC ACID: CPT

## 2024-10-02 PROCEDURE — 96361 HYDRATE IV INFUSION ADD-ON: CPT

## 2024-10-02 PROCEDURE — 85652 RBC SED RATE AUTOMATED: CPT

## 2024-10-02 PROCEDURE — 96374 THER/PROPH/DIAG INJ IV PUSH: CPT

## 2024-10-02 PROCEDURE — 84145 PROCALCITONIN (PCT): CPT

## 2024-10-02 PROCEDURE — 83735 ASSAY OF MAGNESIUM: CPT

## 2024-10-02 PROCEDURE — 87040 BLOOD CULTURE FOR BACTERIA: CPT

## 2024-10-02 PROCEDURE — 36415 COLL VENOUS BLD VENIPUNCTURE: CPT

## 2024-10-02 PROCEDURE — 99284 EMERGENCY DEPT VISIT MOD MDM: CPT

## 2024-10-02 PROCEDURE — 80076 HEPATIC FUNCTION PANEL: CPT

## 2024-10-02 RX ORDER — 0.9 % SODIUM CHLORIDE 0.9 %
1000 INTRAVENOUS SOLUTION INTRAVENOUS ONCE
Status: DISCONTINUED | OUTPATIENT
Start: 2024-10-02 | End: 2024-10-02 | Stop reason: HOSPADM

## 2024-10-02 RX ORDER — 0.9 % SODIUM CHLORIDE 0.9 %
1000 INTRAVENOUS SOLUTION INTRAVENOUS ONCE
Status: COMPLETED | OUTPATIENT
Start: 2024-10-02 | End: 2024-10-02

## 2024-10-02 RX ADMIN — SODIUM CHLORIDE 1000 ML: 9 INJECTION, SOLUTION INTRAVENOUS at 12:13

## 2024-10-02 RX ADMIN — CEFTRIAXONE SODIUM 1000 MG: 1 INJECTION, POWDER, FOR SOLUTION INTRAMUSCULAR; INTRAVENOUS at 12:12

## 2024-10-02 ASSESSMENT — PAIN - FUNCTIONAL ASSESSMENT: PAIN_FUNCTIONAL_ASSESSMENT: NONE - DENIES PAIN

## 2024-10-02 NOTE — DISCHARGE INSTRUCTIONS
Thank you for choosing our Emergency Department for your care.  It is our privilege to care for you in your time of need.  In the next several days, you may receive a survey via email or mailed to your home about your experience with our team.  We would greatly appreciate you taking a few minutes to complete the survey, as we use this information to learn what we have done well and what we could be doing better. Thank you for trusting us with your care!    Below you will find a list of your tests from today's visit.   Labs  Recent Results (from the past 12 hour(s))   CBC with Auto Differential    Collection Time: 10/02/24 11:17 AM   Result Value Ref Range    WBC 6.5 4.1 - 11.1 K/uL    RBC 3.45 (L) 4.10 - 5.70 M/uL    Hemoglobin 10.6 (L) 12.1 - 17.0 g/dL    Hematocrit 31.4 (L) 36.6 - 50.3 %    MCV 91.0 80.0 - 99.0 FL    MCH 30.7 26.0 - 34.0 PG    MCHC 33.8 30.0 - 36.5 g/dL    RDW 15.1 (H) 11.5 - 14.5 %    Platelets 203 150 - 400 K/uL    MPV 9.5 8.9 - 12.9 FL    Nucleated RBCs 0.0 0.0  WBC    nRBC 0.00 0.00 - 0.01 K/uL    Neutrophils % 73 32 - 75 %    Lymphocytes % 11 (L) 12 - 49 %    Monocytes % 10 5 - 13 %    Eosinophils % 4 0 - 7 %    Basophils % 1 0 - 1 %    Immature Granulocytes % 1 (H) 0 - 0.5 %    Neutrophils Absolute 4.9 1.8 - 8.0 K/UL    Lymphocytes Absolute 0.7 (L) 0.8 - 3.5 K/UL    Monocytes Absolute 0.7 0.0 - 1.0 K/UL    Eosinophils Absolute 0.2 0.0 - 0.4 K/UL    Basophils Absolute 0.0 0.0 - 0.1 K/UL    Immature Granulocytes Absolute 0.0 0.00 - 0.04 K/UL    Differential Type AUTOMATED     BMP    Collection Time: 10/02/24 11:17 AM   Result Value Ref Range    Sodium 137 136 - 145 mmol/L    Potassium 5.1 3.5 - 5.1 mmol/L    Chloride 98 97 - 108 mmol/L    CO2 28 21 - 32 mmol/L    Anion Gap 11 2 - 12 mmol/L    Glucose 94 65 - 100 mg/dL    BUN 39 (H) 6 - 20 mg/dL    Creatinine 8.07 (H) 0.70 - 1.30 mg/dL    BUN/Creatinine Ratio 5 (L) 12 - 20      Est, Glom Filt Rate 7 (L) >60 ml/min/1.73m2    Calcium

## 2024-10-02 NOTE — ED PROVIDER NOTES
[facility] EMERGENCY DEPT  EMERGENCY DEPARTMENT HISTORY AND PHYSICAL EXAM      Date: 10/2/2024  Patient Name: Mike Auguste  MRN: [id number]  Birthdate 1955  Date of evaluation: 10/2/2024  Provider: Delfina Bui MD   Note Started: 1:56 PM EDT 10/2/24    HISTORY OF PRESENT ILLNESS     Chief Complaint   Patient presents with    Dressing Change    Wound Check       History Provided By: Patient    HPI: Mike Auguste is a 69 y.o. male patient presents with a known left leg wound.  Needs his bandage change has not been changed in a while.  Does follow with wound care center.  Also on dialysis.    PAST MEDICAL HISTORY   Past Medical History:  Past Medical History:   Diagnosis Date    Arthritis     shoulder    At risk for sleep apnea 10/20/2023    LAYLA 5 - recommendation faxed to PCP    Cancer (HCC)     CKD (chronic kidney disease) stage 4, GFR 15-29 ml/min ([facility])     DM (diabetes mellitus) ([facility])     Diet controlled    H/O cervical spine surgery     Hemodialysis patient ([facility])     tu/th/sat Dr DURAND [place] kidney specialist    HTN (hypertension)     Hypothyroid     Murmur     Prostate cancer (HCC) 2015    radiation       Past Surgical History:  Past Surgical History:   Procedure Laterality Date    CARDIAC CATHETERIZATION  2009    [facility] - Dr. Tomlin    DIALYSIS CATHETER INSERTION      peritoneal    DIALYSIS FISTULA CREATION Left 08/28/2023    LEFT UPPER EXTREMITY ARTERIOVENOUS FISTULA  (MAC W/BLOCK) performed by Osorio RENO MD at [facility] MAIN OR    IR TUNNELED CATHETER PLACEMENT GREATER THAN 5 YEARS  5/1/2023    IR TUNNELED CATHETER PLACEMENT GREATER THAN 5 YEARS  05/01/2023    IR TUNNELED CATHETER PLACEMENT GREATER THAN 5 YEARS 5/1/2023 [facility] RAD ANGIO IR    ORTHOPEDIC SURGERY  2020    Cervical Spine Surgery    VASCULAR SURGERY Left 10/25/2023    LEFT ARM BRACHIO CEPHALIC TRANSPOSITION FISTULA WITH ANGIOPLASTY, REMOVAL PERITONEAL DIALYSIS CATHETER performed by Osorio Santana MD at [facility] MAIN OR       Family History:  Family  chronic inflammation of the CRP sed rate.  Received his dose of antibiotics with improvement in blood pressure.  Patient is not allowing us to recheck his lactate.  I will discharge home with Augmentin.  With his kidney disease I will provide him with a nephrologist.  Patient is comfortable with going home.  He also has wound care to follow with [HP]      ED Course User Index  [HP] Delfina Bui MD   Patient is also feeling much better comfortable with discharge no respiratory distress or evidence of fluid overload.    Pt is CKD on hd    SEPSIS Reassessment: Sepsis reassessment not applicable    Clinical Management Tools:  Not Applicable    Patient was given the following medications:  Medications   sodium chloride 0.9 % bolus 1,000 mL (has no administration in time range)   sodium chloride 0.9 % bolus 1,000 mL (1,000 mLs IntraVENous New Bag 10/2/24 1213)   cefTRIAXone (ROCEPHIN) 1,000 mg in sterile water 10 mL IV syringe (1,000 mg IntraVENous Given 10/2/24 1212)       CONSULTS: See ED Course/MDM for further details.  None     Social Determinants affecting Diagnosis/Treatment: None    Smoking Cessation: Not Applicable    PROCEDURES   Unless otherwise noted above, none  Procedures      CRITICAL CARE TIME   Patient does not meet Critical Care Time, 0 minutes    ED IMPRESSION     1. Cellulitis of lower extremity, unspecified laterality    2. Renal insufficiency    3. Elevated lactic acid level          DISPOSITION/PLAN   DISPOSITION Decision To Discharge 10/02/2024 01:47:29 PM  Condition at Disposition: Data Unavailable    Discharge Note: The patient is stable for discharge home. The signs, symptoms, diagnosis, and discharge instructions have been discussed, understanding conveyed, and agreed upon. The patient is to follow up as recommended or return to ER should their symptoms worsen.      PATIENT REFERRED TO:  Delmar Garcia DO  [id number] [facility] [id number] [phone]          Adamaris Haas,

## 2024-10-02 NOTE — TELEPHONE ENCOUNTER
Patient called today. He was seen in ED yesterday and has a wound. He needs to follow up with Dr. Ennis. When should I put him on? He has dialysis tomorrow. [phone]

## 2024-10-02 NOTE — ED NOTES
Patient refused to have his blood draw for repeated lactic acid and stated that he wants to go home. Dr. Bui notified and agreed to discharge.

## 2024-10-02 NOTE — ED TRIAGE NOTES
Arrives to ED by self with complaints of wanting dressing changed to LLE; wound noted, reports for approx 1 year, follows with vascular and podiatry.     GCS 15

## 2024-10-03 NOTE — ED PROVIDER NOTES
[facility] EMERGENCY DEPT  EMERGENCY DEPARTMENT HISTORY AND PHYSICAL EXAM      Date: 10/1/2024  Patient Name: Mike Auguste  MRN: [id number]  Birthdate 1955  Date of evaluation: 10/1/2024  Provider: Roxane Higgins MD   Note Started: 11:53 PM EDT 10/2/24    HISTORY OF PRESENT ILLNESS     Chief Complaint   Patient presents with    Leg Injury       History Provided By: Patient    HPI: Mike Auguste is a 69 y.o. male who presents with chronic wound to his right lower extremity.  Patient reports he has had swelling and skin changes for a long time, states that a couple weeks ago wound started to open.  He comes in today because the wound is seeping fluid.  He denies any fevers, pain, or purulent discharge.    PAST MEDICAL HISTORY   Past Medical History:  Past Medical History:   Diagnosis Date    Arthritis     shoulder    At risk for sleep apnea 10/20/2023    ALYLA 5 - recommendation faxed to PCP    Cancer ([facility])     CKD (chronic kidney disease) stage 4, GFR 15-29 ml/min ([facility])     DM (diabetes mellitus) ([facility])     Diet controlled    H/O cervical spine surgery     Hemodialysis patient ([facility])     tu/th/sat Dr DURAND [place] kidney specialist    HTN (hypertension)     Hypothyroid     Murmur     Prostate cancer ([facility]) 2015    radiation       Past Surgical History:  Past Surgical History:   Procedure Laterality Date    CARDIAC CATHETERIZATION  2009    [facility] - Dr. Tomlin    DIALYSIS CATHETER INSERTION      peritoneal    DIALYSIS FISTULA CREATION Left 08/28/2023    LEFT UPPER EXTREMITY ARTERIOVENOUS FISTULA  (MAC W/BLOCK) performed by Osorio RENO MD at [facility] MAIN OR    IR TUNNELED CATHETER PLACEMENT GREATER THAN 5 YEARS  5/1/2023    IR TUNNELED CATHETER PLACEMENT GREATER THAN 5 YEARS  05/01/2023    IR TUNNELED CATHETER PLACEMENT GREATER THAN 5 YEARS 5/1/2023 [facility] RAD ANGIO IR    ORTHOPEDIC SURGERY  2020    Cervical Spine Surgery    VASCULAR SURGERY Left 10/25/2023    LEFT ARM BRACHIO CEPHALIC TRANSPOSITION FISTULA WITH

## 2024-10-06 LAB
BACTERIA SPEC CULT: NORMAL
BACTERIA SPEC CULT: NORMAL
Lab: NORMAL
Lab: NORMAL

## 2024-10-08 ENCOUNTER — HOSPITAL ENCOUNTER (EMERGENCY)
Facility: HOSPITAL | Age: 69
Discharge: HOME | End: 2024-10-08
Payer: MEDICARE

## 2024-10-08 VITALS
RESPIRATION RATE: 18 BRPM | HEART RATE: 72 BPM | HEIGHT: 67 IN | DIASTOLIC BLOOD PRESSURE: 60 MMHG | OXYGEN SATURATION: 98 % | SYSTOLIC BLOOD PRESSURE: 113 MMHG | TEMPERATURE: 97.7 F | WEIGHT: 265 LBS | BODY MASS INDEX: 41.59 KG/M2

## 2024-10-08 DIAGNOSIS — T14.8XXA CHRONIC WOUND: Primary | ICD-10-CM

## 2024-10-08 LAB
BACTERIA SPEC CULT: NORMAL
BACTERIA SPEC CULT: NORMAL
Lab: NORMAL
Lab: NORMAL

## 2024-10-08 PROCEDURE — 99282 EMERGENCY DEPT VISIT SF MDM: CPT

## 2024-10-08 ASSESSMENT — PAIN DESCRIPTION - LOCATION: LOCATION: LEG

## 2024-10-08 ASSESSMENT — PAIN DESCRIPTION - ORIENTATION: ORIENTATION: RIGHT;LEFT

## 2024-10-08 ASSESSMENT — LIFESTYLE VARIABLES
HOW MANY STANDARD DRINKS CONTAINING ALCOHOL DO YOU HAVE ON A TYPICAL DAY: 3 OR 4
HOW OFTEN DO YOU HAVE A DRINK CONTAINING ALCOHOL: 2-3 TIMES A WEEK

## 2024-10-08 ASSESSMENT — PAIN - FUNCTIONAL ASSESSMENT: PAIN_FUNCTIONAL_ASSESSMENT: 0-10

## 2024-10-08 ASSESSMENT — PAIN SCALES - GENERAL: PAINLEVEL_OUTOF10: 0

## 2024-10-08 NOTE — ED PROVIDER NOTES
[facility] EMERGENCY DEPT  EMERGENCY DEPARTMENT HISTORY AND PHYSICAL EXAM      Date: 10/8/2024  Patient Name: Mike Auguste  MRN: [id number]  YOB: 1955  Date of evaluation: 10/8/2024  Provider: Francoise Ruano PA-C   Note Started: 11:47 AM EDT 10/8/24    HISTORY OF PRESENT ILLNESS     Chief Complaint   Patient presents with    Dressing Change       History Provided By: Patient    HPI: Mike Auguste is a 69 y.o. male requesting dressing changes for chronic wounds to his bilateral shins and left foot. Patient reports these wounds have been present for \"a long time\" and have been draining fluid for weeks. He does not follow with wound care nor podiatry. He was seen in the ED on 10/1/24 for these wounds and was evaluated by wound care at that time. He was suppose to call to start outpatient wound care but states he \"has not gotten around to it.\" He returned to the ED on 10/2/24 requesting wound dressing changes. At that time he underwent a thorough workup involving labs and radiographs of his left foot due to concern for possible gangrene versus osteomyelitis versus sepsis. At that time he had no leukocytosis but lactic acid was found to be elevated at 2.3.  He was given fluids but declined having his lactic acid repeated.  Radiograph of his left foot showed no signs of osteomyelitis.  He was offered admission for wound management and IV antibiotics, however, patient declined. He was given a single dose of IV antibiotics in the ED and discharged with oral Augmentin.  He states that he has been taking this as prescribed. He does have an appointment with his PCP Dr. Garcia tomorrow.  He denies pain around the wound and states that the purulent drainage has decreased since being on the oral antibiotics.  He denies fevers, chills, weakness, dizziness, headaches, GI symptoms,  symptoms, SOB, or CP.    PAST MEDICAL HISTORY   Past Medical History:  Past Medical History:   Diagnosis Date    Arthritis     shoulder

## 2024-10-08 NOTE — ED TRIAGE NOTES
Reports he came to get bandages to his legs. Reports he came last Thursday and did them. At this time no bandages are on his leg \"they came off\". Reports he is suppose to see  tomorrow.

## 2024-10-10 DIAGNOSIS — I87.311 IDIOPATHIC CHRONIC VENOUS HYPERTENSION OF RIGHT LEG WITH ULCER (HCC): Primary | ICD-10-CM

## 2024-10-10 DIAGNOSIS — L97.919 IDIOPATHIC CHRONIC VENOUS HYPERTENSION OF RIGHT LEG WITH ULCER (HCC): Primary | ICD-10-CM

## 2024-10-12 ENCOUNTER — HOSPITAL ENCOUNTER (EMERGENCY)
Facility: HOSPITAL | Age: 69
Discharge: HOME | End: 2024-10-13
Attending: STUDENT IN AN ORGANIZED HEALTH CARE EDUCATION/TRAINING PROGRAM
Payer: MEDICARE

## 2024-10-12 DIAGNOSIS — Z99.2 CKD (CHRONIC KIDNEY DISEASE) REQUIRING CHRONIC DIALYSIS (HCC): Primary | ICD-10-CM

## 2024-10-12 DIAGNOSIS — N18.6 CKD (CHRONIC KIDNEY DISEASE) REQUIRING CHRONIC DIALYSIS (HCC): Primary | ICD-10-CM

## 2024-10-12 LAB
ANION GAP SERPL CALC-SCNC: 11 MMOL/L (ref 2–12)
BASOPHILS # BLD: 0 K/UL (ref 0–0.1)
BASOPHILS NFR BLD: 1 % (ref 0–1)
BUN SERPL-MCNC: 55 MG/DL (ref 6–20)
BUN/CREAT SERPL: 5 (ref 12–20)
CA-I BLD-MCNC: 9 MG/DL (ref 8.5–10.1)
CHLORIDE SERPL-SCNC: 94 MMOL/L (ref 97–108)
CO2 SERPL-SCNC: 29 MMOL/L (ref 21–32)
CREAT SERPL-MCNC: 11.5 MG/DL (ref 0.7–1.3)
DIFFERENTIAL METHOD BLD: ABNORMAL
EOSINOPHIL # BLD: 0 K/UL (ref 0–0.4)
EOSINOPHIL NFR BLD: 0 % (ref 0–7)
ERYTHROCYTE [DISTWIDTH] IN BLOOD BY AUTOMATED COUNT: 15.2 % (ref 11.5–14.5)
GLUCOSE SERPL-MCNC: 110 MG/DL (ref 65–100)
HCT VFR BLD AUTO: 29.5 % (ref 36.6–50.3)
HGB BLD-MCNC: 9.8 G/DL (ref 12.1–17)
IMM GRANULOCYTES # BLD AUTO: 0 K/UL (ref 0–0.04)
IMM GRANULOCYTES NFR BLD AUTO: 1 % (ref 0–0.5)
LYMPHOCYTES # BLD: 0.3 K/UL (ref 0.8–3.5)
LYMPHOCYTES NFR BLD: 6 % (ref 12–49)
MCH RBC QN AUTO: 30.4 PG (ref 26–34)
MCHC RBC AUTO-ENTMCNC: 33.2 G/DL (ref 30–36.5)
MCV RBC AUTO: 91.6 FL (ref 80–99)
MONOCYTES # BLD: 0.4 K/UL (ref 0–1)
MONOCYTES NFR BLD: 9 % (ref 5–13)
NEUTS SEG # BLD: 3.7 K/UL (ref 1.8–8)
NEUTS SEG NFR BLD: 83 % (ref 32–75)
NRBC # BLD: 0 K/UL (ref 0–0.01)
NRBC BLD-RTO: 0 PER 100 WBC
PLATELET # BLD AUTO: 169 K/UL (ref 150–400)
PMV BLD AUTO: 9.4 FL (ref 8.9–12.9)
POTASSIUM SERPL-SCNC: 6.4 MMOL/L (ref 3.5–5.1)
POTASSIUM SERPL-SCNC: 6.7 MMOL/L (ref 3.5–5.1)
RBC # BLD AUTO: 3.22 M/UL (ref 4.1–5.7)
SODIUM SERPL-SCNC: 134 MMOL/L (ref 136–145)
TROPONIN I SERPL HS-MCNC: 8 NG/L (ref 0–76)
TROPONIN I SERPL HS-MCNC: 9 NG/L (ref 0–76)
WBC # BLD AUTO: 4.4 K/UL (ref 4.1–11.1)

## 2024-10-12 PROCEDURE — 93005 ELECTROCARDIOGRAM TRACING: CPT | Performed by: STUDENT IN AN ORGANIZED HEALTH CARE EDUCATION/TRAINING PROGRAM

## 2024-10-12 PROCEDURE — 99284 EMERGENCY DEPT VISIT MOD MDM: CPT

## 2024-10-12 PROCEDURE — 36415 COLL VENOUS BLD VENIPUNCTURE: CPT

## 2024-10-12 PROCEDURE — G0257 UNSCHED DIALYSIS ESRD PT HOS: HCPCS

## 2024-10-12 PROCEDURE — 6370000000 HC RX 637 (ALT 250 FOR IP): Performed by: STUDENT IN AN ORGANIZED HEALTH CARE EDUCATION/TRAINING PROGRAM

## 2024-10-12 PROCEDURE — 2709999900 HC NON-CHARGEABLE SUPPLY

## 2024-10-12 PROCEDURE — 96365 THER/PROPH/DIAG IV INF INIT: CPT

## 2024-10-12 PROCEDURE — 85025 COMPLETE CBC W/AUTO DIFF WBC: CPT

## 2024-10-12 PROCEDURE — 80048 BASIC METABOLIC PNL TOTAL CA: CPT

## 2024-10-12 PROCEDURE — 84484 ASSAY OF TROPONIN QUANT: CPT

## 2024-10-12 PROCEDURE — 6360000002 HC RX W HCPCS: Performed by: STUDENT IN AN ORGANIZED HEALTH CARE EDUCATION/TRAINING PROGRAM

## 2024-10-12 PROCEDURE — 84132 ASSAY OF SERUM POTASSIUM: CPT

## 2024-10-12 RX ORDER — GLUCAGON 1 MG/ML
1 KIT INJECTION PRN
Status: DISCONTINUED | OUTPATIENT
Start: 2024-10-12 | End: 2024-10-13 | Stop reason: HOSPADM

## 2024-10-12 RX ORDER — ALBUTEROL SULFATE 5 MG/ML
10 SOLUTION RESPIRATORY (INHALATION) ONCE
Status: COMPLETED | OUTPATIENT
Start: 2024-10-12 | End: 2024-10-12

## 2024-10-12 RX ORDER — CALCIUM GLUCONATE 20 MG/ML
1000 INJECTION, SOLUTION INTRAVENOUS ONCE
Status: COMPLETED | OUTPATIENT
Start: 2024-10-12 | End: 2024-10-12

## 2024-10-12 RX ORDER — DEXTROSE MONOHYDRATE 100 MG/ML
INJECTION, SOLUTION INTRAVENOUS CONTINUOUS PRN
Status: DISCONTINUED | OUTPATIENT
Start: 2024-10-12 | End: 2024-10-13 | Stop reason: HOSPADM

## 2024-10-12 RX ADMIN — CALCIUM GLUCONATE 1000 MG: 20 INJECTION, SOLUTION INTRAVENOUS at 20:06

## 2024-10-12 RX ADMIN — SODIUM ZIRCONIUM CYCLOSILICATE 10 G: 10 POWDER, FOR SUSPENSION ORAL at 20:13

## 2024-10-12 RX ADMIN — ALBUTEROL SULFATE 10 MG: 2.5 SOLUTION RESPIRATORY (INHALATION) at 20:10

## 2024-10-12 ASSESSMENT — PAIN SCALES - GENERAL: PAINLEVEL_OUTOF10: 0

## 2024-10-12 ASSESSMENT — PAIN - FUNCTIONAL ASSESSMENT: PAIN_FUNCTIONAL_ASSESSMENT: 0-10

## 2024-10-12 NOTE — ED TRIAGE NOTES
Pt missed dialysis on Thursday and today because he didn't feel good. He has been feeling weak for a week now.

## 2024-10-12 NOTE — ED PROVIDER NOTES
planned 2 hours of dialysis.  This was discussed with the nephrologist to states patient can be discharged [KK]      ED Course User Index  [AA] Ranulfo German MD  [KK] Karen Faustin MD       Patient was given the following medications:  Medications   calcium gluconate 1,000 mg in sodium chloride 50 mL (0 mg IntraVENous Stopped 10/12/24 2032)   albuterol (PROVENTIL) nebulizer solution 10 mg (10 mg Nebulization Given 10/12/24 2010)   sodium zirconium cyclosilicate (LOKELMA) oral suspension 10 g (10 g Oral Given 10/12/24 2013)   acetaminophen (TYLENOL) tablet 1,000 mg (1,000 mg Oral Given 10/13/24 0032)       CONSULTS: See ED Course/MDM for further details.  IP CONSULT TO CASE MANAGEMENT  IP CONSULT TO NEPHROLOGY  IP CONSULT TO NEPHROLOGY     Social Determinants affecting Diagnosis/Treatment: None    Smoking Cessation: Not Applicable    PROCEDURES   Unless otherwise noted above, none  Procedures      CRITICAL CARE TIME   CRITICAL CARE NOTE :    1:13 PM    IMPENDING DETERIORATION -Metabolic  ASSOCIATED RISK FACTORS - Metabolic changes  MANAGEMENT- Bedside Assessment  INTERPRETATION -  ECG and Blood Pressure  INTERVENTIONS - Metabolic interventions  CASE REVIEW - Medical Sub-Specialist and Nursing  TREATMENT RESPONSE -Stable  PERFORMED BY - Self    NOTES:  I have spent 35 minutes of critical care time involved in lab review, consultations with specialist, family decision- making, bedside attention and documentation. Time is exclusive of EKG interpretation, imaging interpretation and separately billed procedures.  During this entire length of time I was immediately available to the patient.  Ranulfo German MD    ED IMPRESSION     1. CKD (chronic kidney disease) requiring chronic dialysis (HCC)          DISPOSITION/PLAN   DISPOSITION Decision To Discharge 10/12/2024 11:43:14 PM  Condition at Disposition: Data Unavailable    Discharge Note: The patient is stable for discharge home. The signs, symptoms,

## 2024-10-13 VITALS
HEART RATE: 90 BPM | WEIGHT: 265 LBS | OXYGEN SATURATION: 97 % | SYSTOLIC BLOOD PRESSURE: 105 MMHG | TEMPERATURE: 97.9 F | RESPIRATION RATE: 24 BRPM | DIASTOLIC BLOOD PRESSURE: 70 MMHG | HEIGHT: 67 IN | BODY MASS INDEX: 41.59 KG/M2

## 2024-10-13 PROCEDURE — 6370000000 HC RX 637 (ALT 250 FOR IP): Performed by: EMERGENCY MEDICINE

## 2024-10-13 RX ORDER — ACETAMINOPHEN 500 MG
1000 TABLET ORAL
Status: COMPLETED | OUTPATIENT
Start: 2024-10-13 | End: 2024-10-13

## 2024-10-13 RX ADMIN — ACETAMINOPHEN 1000 MG: 500 TABLET ORAL at 00:32

## 2024-10-13 ASSESSMENT — PAIN - FUNCTIONAL ASSESSMENT: PAIN_FUNCTIONAL_ASSESSMENT: 0-10

## 2024-10-13 ASSESSMENT — PAIN SCALES - GENERAL
PAINLEVEL_OUTOF10: 9
PAINLEVEL_OUTOF10: 3

## 2024-10-13 NOTE — DIALYSIS
Report given to DANYA Leigh ED. Pt dialyzed for 1 hour and 20 minutes, removed 1.3 liters of fluid.  Unable to complete treatment due to automatic disinfection process of RO. Dr So aware. Pt returned to ED.

## 2024-10-13 NOTE — ED NOTES
Cleaned up pt's BM. Linens changed. Brief placed under pt and external cath placed.     Pt now off the floor to dialysis

## 2024-10-13 NOTE — ED NOTES
Spoke with charge RN who consulted Dr. ORELLANA and dialysis nurse about pt's Tx being cut short.   Dialysis RN advsd Charge RNTracy that Nephrology is okay with pt going home w/out redraw.

## 2024-10-15 LAB
EKG ATRIAL RATE: 77 BPM
EKG DIAGNOSIS: NORMAL
EKG P AXIS: 78 DEGREES
EKG P-R INTERVAL: 166 MS
EKG Q-T INTERVAL: 364 MS
EKG QRS DURATION: 80 MS
EKG QTC CALCULATION (BAZETT): 411 MS
EKG R AXIS: 32 DEGREES
EKG T AXIS: 77 DEGREES
EKG VENTRICULAR RATE: 77 BPM

## 2024-10-19 ENCOUNTER — HOSPITAL ENCOUNTER (EMERGENCY)
Facility: HOSPITAL | Age: 69
Discharge: HOME | End: 2024-10-19
Attending: EMERGENCY MEDICINE
Payer: MEDICARE

## 2024-10-19 ENCOUNTER — APPOINTMENT (OUTPATIENT)
Facility: HOSPITAL | Age: 69
End: 2024-10-19
Payer: MEDICARE

## 2024-10-19 VITALS
OXYGEN SATURATION: 96 % | HEART RATE: 77 BPM | TEMPERATURE: 98.2 F | SYSTOLIC BLOOD PRESSURE: 128 MMHG | DIASTOLIC BLOOD PRESSURE: 79 MMHG | RESPIRATION RATE: 20 BRPM

## 2024-10-19 DIAGNOSIS — Z99.2 ESRD ON HEMODIALYSIS (HCC): ICD-10-CM

## 2024-10-19 DIAGNOSIS — E87.70 HYPERVOLEMIA, UNSPECIFIED HYPERVOLEMIA TYPE: Primary | ICD-10-CM

## 2024-10-19 DIAGNOSIS — N18.6 ESRD ON HEMODIALYSIS (HCC): ICD-10-CM

## 2024-10-19 LAB
ALBUMIN SERPL-MCNC: 3.1 G/DL (ref 3.5–5)
ALBUMIN/GLOB SERPL: 0.7 (ref 1.1–2.2)
ALP SERPL-CCNC: 82 U/L (ref 45–117)
ALT SERPL-CCNC: 14 U/L (ref 12–78)
ANION GAP SERPL CALC-SCNC: 14 MMOL/L (ref 2–12)
AST SERPL W P-5'-P-CCNC: 11 U/L (ref 15–37)
BASOPHILS # BLD: 0 K/UL (ref 0–0.1)
BASOPHILS NFR BLD: 0 % (ref 0–1)
BILIRUB SERPL-MCNC: 0.4 MG/DL (ref 0.2–1)
BUN SERPL-MCNC: 94 MG/DL (ref 6–20)
BUN/CREAT SERPL: 6 (ref 12–20)
CA-I BLD-MCNC: 9.2 MG/DL (ref 8.5–10.1)
CHLORIDE SERPL-SCNC: 93 MMOL/L (ref 97–108)
CO2 SERPL-SCNC: 26 MMOL/L (ref 21–32)
CREAT SERPL-MCNC: 14.8 MG/DL (ref 0.7–1.3)
DIFFERENTIAL METHOD BLD: ABNORMAL
EOSINOPHIL # BLD: 0.3 K/UL (ref 0–0.4)
EOSINOPHIL NFR BLD: 4 % (ref 0–7)
ERYTHROCYTE [DISTWIDTH] IN BLOOD BY AUTOMATED COUNT: 15.1 % (ref 11.5–14.5)
GLOBULIN SER CALC-MCNC: 4.7 G/DL (ref 2–4)
GLUCOSE SERPL-MCNC: 107 MG/DL (ref 65–100)
HCT VFR BLD AUTO: 28.7 % (ref 36.6–50.3)
HGB BLD-MCNC: 9.5 G/DL (ref 12.1–17)
IMM GRANULOCYTES # BLD AUTO: 0.1 K/UL (ref 0–0.04)
IMM GRANULOCYTES NFR BLD AUTO: 1 % (ref 0–0.5)
LYMPHOCYTES # BLD: 0.8 K/UL (ref 0.8–3.5)
LYMPHOCYTES NFR BLD: 10 % (ref 12–49)
MCH RBC QN AUTO: 30.4 PG (ref 26–34)
MCHC RBC AUTO-ENTMCNC: 33.1 G/DL (ref 30–36.5)
MCV RBC AUTO: 92 FL (ref 80–99)
MONOCYTES # BLD: 0.7 K/UL (ref 0–1)
MONOCYTES NFR BLD: 8 % (ref 5–13)
NEUTS SEG # BLD: 6 K/UL (ref 1.8–8)
NEUTS SEG NFR BLD: 77 % (ref 32–75)
NRBC # BLD: 0 K/UL (ref 0–0.01)
NRBC BLD-RTO: 0 PER 100 WBC
PLATELET # BLD AUTO: 234 K/UL (ref 150–400)
PMV BLD AUTO: 9.3 FL (ref 8.9–12.9)
POTASSIUM SERPL-SCNC: 6 MMOL/L (ref 3.5–5.1)
PROT SERPL-MCNC: 7.8 G/DL (ref 6.4–8.2)
RBC # BLD AUTO: 3.12 M/UL (ref 4.1–5.7)
SODIUM SERPL-SCNC: 133 MMOL/L (ref 136–145)
TROPONIN I SERPL HS-MCNC: 7 NG/L (ref 0–76)
WBC # BLD AUTO: 7.9 K/UL (ref 4.1–11.1)

## 2024-10-19 PROCEDURE — 71045 X-RAY EXAM CHEST 1 VIEW: CPT

## 2024-10-19 PROCEDURE — 90935 HEMODIALYSIS ONE EVALUATION: CPT

## 2024-10-19 PROCEDURE — 2709999900 HC NON-CHARGEABLE SUPPLY

## 2024-10-19 PROCEDURE — 6370000000 HC RX 637 (ALT 250 FOR IP): Performed by: EMERGENCY MEDICINE

## 2024-10-19 PROCEDURE — 36415 COLL VENOUS BLD VENIPUNCTURE: CPT

## 2024-10-19 PROCEDURE — 84484 ASSAY OF TROPONIN QUANT: CPT

## 2024-10-19 PROCEDURE — 85025 COMPLETE CBC W/AUTO DIFF WBC: CPT

## 2024-10-19 PROCEDURE — 99284 EMERGENCY DEPT VISIT MOD MDM: CPT

## 2024-10-19 PROCEDURE — 80053 COMPREHEN METABOLIC PANEL: CPT

## 2024-10-19 PROCEDURE — G0257 UNSCHED DIALYSIS ESRD PT HOS: HCPCS

## 2024-10-19 RX ORDER — ONDANSETRON 4 MG/1
4 TABLET, ORALLY DISINTEGRATING ORAL ONCE
Status: COMPLETED | OUTPATIENT
Start: 2024-10-19 | End: 2024-10-19

## 2024-10-19 RX ADMIN — ONDANSETRON 4 MG: 4 TABLET, ORALLY DISINTEGRATING ORAL at 15:24

## 2024-10-19 ASSESSMENT — PAIN SCALES - GENERAL: PAINLEVEL_OUTOF10: 0

## 2024-10-19 ASSESSMENT — PAIN - FUNCTIONAL ASSESSMENT: PAIN_FUNCTIONAL_ASSESSMENT: NONE - DENIES PAIN

## 2024-10-19 NOTE — ED PROVIDER NOTES
Activity: Not on file   Stress: Not on file   Social Connections: Not on file   Intimate Partner Violence: Not on file   Depression: Not at risk (1/15/2021)    Received from [facility]  (prior to 6/17/2023), [facility]  (prior to 6/17/2023)    PHQ-2     Total Score PHQ 2: 0   Housing Stability: Not on file   Interpersonal Safety: Not At Risk (10/8/2024)    Interpersonal Safety Domain Source: IP Abuse Screening     Physical abuse: Denies     Verbal abuse: Denies     Emotional abuse: Denies     Financial abuse: Denies     Sexual abuse: Denies   Utilities: Not on file       PHYSICAL EXAM   Physical Exam  Vitals and nursing note reviewed.   Constitutional:       General: He is not in acute distress.     Appearance: Normal appearance. He is normal weight.   HENT:      Head: Normocephalic and atraumatic.      Nose: Nose normal.      Mouth/Throat:      Mouth: Mucous membranes are moist.   Eyes:      Conjunctiva/sclera: Conjunctivae normal.   Cardiovascular:      Rate and Rhythm: Normal rate.      Pulses: Normal pulses.      Heart sounds: Normal heart sounds.   Pulmonary:      Effort: Pulmonary effort is normal. No respiratory distress.      Breath sounds: Normal breath sounds.   Musculoskeletal:         General: No swelling or deformity. Normal range of motion.   Skin:     Comments: L arm fistula   Neurological:      General: No focal deficit present.      Mental Status: He is alert.   Psychiatric:         Mood and Affect: Mood normal.         Behavior: Behavior normal.           SCREENINGS                No data recorded    LAB, EKG AND DIAGNOSTIC RESULTS   Labs:  Recent Results (from the past 12 hour(s))   CBC with Auto Differential    Collection Time: 10/19/24  9:23 AM   Result Value Ref Range    WBC 7.9 4.1 - 11.1 K/uL    RBC 3.12 (L) 4.10 - 5.70 M/uL    Hemoglobin 9.5 (L) 12.1 - 17.0 g/dL    Hematocrit 28.7 (L) 36.6 - 50.3 %    MCV 92.0 80.0 - 99.0 FL    MCH 30.4 26.0 - 34.0 PG     process on portable chest.         Electronically signed by TIFFANY AC           ED COURSE and DIFFERENTIAL DIAGNOSIS/MDM   2:36 PM Differential and Considerations:     Records Reviewed (source and summary of external notes): Prior medical records and Nursing notes.    Vitals:    Vitals:    10/19/24 1130 10/19/24 1200 10/19/24 1230 10/19/24 1300   BP: 120/74 118/68 119/74 118/65   Pulse: 74 74 70 71   Resp:       Temp:       TempSrc:       SpO2:            ED COURSE  ED Course as of 10/19/24 1436   Sat Oct 19, 2024   1009 68 yo M who presents for volume overload.  Has not gone to dialysis in the week because he is having diarrhea.  He still makes some urine.  He is feeling short of breath and like he has too much fluid.  Does have some swelling of his bilateral lower extremities which is chronic.  He labs including a CBC, CMP and troponin.  Chest x-ray ordered as well.  Consulted Dr. oMck for HD.  [LW]   1435 Patient had HD. Will d/c [LW]      ED Course User Index  [LW] Radha Junior MD       SEPSIS Reassessment: Sepsis reassessment not applicable    Clinical Management Tools:  Not Applicable    Patient was given the following medications:  Medications - No data to display    CONSULTS: See ED Course/MDM for further details.  IP CONSULT TO NEPHROLOGY     Social Determinants affecting Diagnosis/Treatment: None    Smoking Cessation: Not Applicable    PROCEDURES   Unless otherwise noted above, none  Procedures      CRITICAL CARE TIME   Patient does not meet Critical Care Time, 0 minutes    ED IMPRESSION     1. Hypervolemia, unspecified hypervolemia type    2. ESRD on hemodialysis (HCC)          DISPOSITION/PLAN   DISPOSITION Decision To Discharge 10/19/2024 02:36:09 PM  Condition at Disposition: Data Unavailable    Discharge Note: The patient is stable for discharge home. The signs, symptoms, diagnosis, and discharge instructions have been discussed, understanding conveyed, and agreed upon. The patient is to

## 2024-10-19 NOTE — ED NOTES
Called floor requesting if they have 4x4 Mepilex, they relayed had Mepilight in this size and will send. Will place over draining areas to bilateral shins.

## 2024-10-19 NOTE — DIALYSIS
Pt baljeet 3 hour hemodialysis fair, only able to remove 2.2 liters net fluid, due to c/o leg cramps.  Report called to [facility], ED.  To ED 20, via stretcher

## 2024-10-19 NOTE — CONSULTS
NAME:  Mike Auguste   :   1955   MRN:   [id number]     ATTENDING: No admitting provider for patient encounter.  PCP:  Delmar Garcia DO    Date/Time:  10/19/2024       Subjective:   REQUESTING PHYSICIAN:  REASON FOR CONSULT:   ESRD on HD    History of presenting illness:    Mike Auguste is a 69-year-old male with past medical history including ESRD on HD TTS at [facility], diabetes mellitus, history of prostate cancer, BLE wounds, and noncompliance who presented to the emergency room with complaint of shortness of breath.  Nephrology consulted for management of ESRD on HD.  Initial labs showed, BUN and creatinine 94 and 14.8, potassium 6.0.  Patient is hemodynamically stable.  Inpatient hemodialysis arranged for today    Patient was seen while in HD, he is awake, alert, but seems to be with mild confused.  In no acute distress.  Patient reports last dialysis was on Saturday.  Attributes missing dialysis earlier in the week to vague report of having diarrhea that has now resolved.  States that today he did not feel like driving himself  to treatment.  BLE are edematous, weeping and with chronic wounds present.  Complaining of exertional shortness of breath and poor exercise tolerance.       Past Medical History:   Diagnosis Date    Arthritis     shoulder    At risk for sleep apnea 10/20/2023    LAYLA 5 - recommendation faxed to PCP    Cancer ([facility])     CKD (chronic kidney disease) stage 4, GFR 15-29 ml/min ([facility])     DM (diabetes mellitus) ([facility])     Diet controlled    H/O cervical spine surgery     Hemodialysis patient ([facility])     //sat Dr DURAND [place] kidney specialist    HTN (hypertension)     Hypothyroid     Murmur     Prostate cancer ([facility])     radiation      Past Surgical History:   Procedure Laterality Date    CARDIAC CATHETERIZATION      [facility] - Dr. Tomlin    DIALYSIS CATHETER INSERTION      peritoneal    DIALYSIS FISTULA CREATION Left 2023    LEFT UPPER EXTREMITY ARTERIOVENOUS FISTULA  (MAC  BLE.  Musculoskeletal: no acute joint swellings.     HD access: Left upper arm AV fistula    LAB DATA REVIEWED:    Recent Results (from the past 24 hour(s))   CBC with Auto Differential    Collection Time: 10/19/24  9:23 AM   Result Value Ref Range    WBC 7.9 4.1 - 11.1 K/uL    RBC 3.12 (L) 4.10 - 5.70 M/uL    Hemoglobin 9.5 (L) 12.1 - 17.0 g/dL    Hematocrit 28.7 (L) 36.6 - 50.3 %    MCV 92.0 80.0 - 99.0 FL    MCH 30.4 26.0 - 34.0 PG    MCHC 33.1 30.0 - 36.5 g/dL    RDW 15.1 (H) 11.5 - 14.5 %    Platelets 234 150 - 400 K/uL    MPV 9.3 8.9 - 12.9 FL    Nucleated RBCs 0.0 0.0  WBC    nRBC 0.00 0.00 - 0.01 K/uL    Neutrophils % 77 (H) 32 - 75 %    Lymphocytes % 10 (L) 12 - 49 %    Monocytes % 8 5 - 13 %    Eosinophils % 4 0 - 7 %    Basophils % 0 0 - 1 %    Immature Granulocytes % 1 (H) 0 - 0.5 %    Neutrophils Absolute 6.0 1.8 - 8.0 K/UL    Lymphocytes Absolute 0.8 0.8 - 3.5 K/UL    Monocytes Absolute 0.7 0.0 - 1.0 K/UL    Eosinophils Absolute 0.3 0.0 - 0.4 K/UL    Basophils Absolute 0.0 0.0 - 0.1 K/UL    Immature Granulocytes Absolute 0.1 (H) 0.00 - 0.04 K/UL    Differential Type AUTOMATED     Comprehensive Metabolic Panel    Collection Time: 10/19/24  9:23 AM   Result Value Ref Range    Sodium 133 (L) 136 - 145 mmol/L    Potassium 6.0 (H) 3.5 - 5.1 mmol/L    Chloride 93 (L) 97 - 108 mmol/L    CO2 26 21 - 32 mmol/L    Anion Gap 14 (H) 2 - 12 mmol/L    Glucose 107 (H) 65 - 100 mg/dL    BUN 94 (H) 6 - 20 mg/dL    Creatinine 14.80 (H) 0.70 - 1.30 mg/dL    BUN/Creatinine Ratio 6 (L) 12 - 20      Est, Glom Filt Rate 3 (L) >60 ml/min/1.73m2    Calcium 9.2 8.5 - 10.1 mg/dL    Total Bilirubin 0.4 0.2 - 1.0 mg/dL    AST 11 (L) 15 - 37 U/L    ALT 14 12 - 78 U/L    Alk Phosphatase 82 45 - 117 U/L    Total Protein 7.8 6.4 - 8.2 g/dL    Albumin 3.1 (L) 3.5 - 5.0 g/dL    Globulin 4.7 (H) 2.0 - 4.0 g/dL    Albumin/Globulin Ratio 0.7 (L) 1.1 - 2.2     Troponin    Collection Time: 10/19/24  9:23 AM   Result Value Ref Range

## 2024-10-19 NOTE — ED NOTES
Patient back from dialysis at this time. Dialysis report was patient on for 3 hours, removed 2 liters fluid. Patient moaning at this time, vomited small amount tan stomach contents, relayed this to Dr. Junior

## 2024-10-19 NOTE — DISCHARGE INSTRUCTIONS
Thank you for choosing our Emergency Department for your care.  It is our privilege to care for you in your time of need.  In the next several days, you may receive a survey via email or mailed to your home about your experience with our team.  We would greatly appreciate you taking a few minutes to complete the survey, as we use this information to learn what we have done well and what we could be doing better. Thank you for trusting us with your care!    Below you will find a list of your tests from today's visit.   Labs  Recent Results (from the past 12 hour(s))   CBC with Auto Differential    Collection Time: 10/19/24  9:23 AM   Result Value Ref Range    WBC 7.9 4.1 - 11.1 K/uL    RBC 3.12 (L) 4.10 - 5.70 M/uL    Hemoglobin 9.5 (L) 12.1 - 17.0 g/dL    Hematocrit 28.7 (L) 36.6 - 50.3 %    MCV 92.0 80.0 - 99.0 FL    MCH 30.4 26.0 - 34.0 PG    MCHC 33.1 30.0 - 36.5 g/dL    RDW 15.1 (H) 11.5 - 14.5 %    Platelets 234 150 - 400 K/uL    MPV 9.3 8.9 - 12.9 FL    Nucleated RBCs 0.0 0.0  WBC    nRBC 0.00 0.00 - 0.01 K/uL    Neutrophils % 77 (H) 32 - 75 %    Lymphocytes % 10 (L) 12 - 49 %    Monocytes % 8 5 - 13 %    Eosinophils % 4 0 - 7 %    Basophils % 0 0 - 1 %    Immature Granulocytes % 1 (H) 0 - 0.5 %    Neutrophils Absolute 6.0 1.8 - 8.0 K/UL    Lymphocytes Absolute 0.8 0.8 - 3.5 K/UL    Monocytes Absolute 0.7 0.0 - 1.0 K/UL    Eosinophils Absolute 0.3 0.0 - 0.4 K/UL    Basophils Absolute 0.0 0.0 - 0.1 K/UL    Immature Granulocytes Absolute 0.1 (H) 0.00 - 0.04 K/UL    Differential Type AUTOMATED     Comprehensive Metabolic Panel    Collection Time: 10/19/24  9:23 AM   Result Value Ref Range    Sodium 133 (L) 136 - 145 mmol/L    Potassium 6.0 (H) 3.5 - 5.1 mmol/L    Chloride 93 (L) 97 - 108 mmol/L    CO2 26 21 - 32 mmol/L    Anion Gap 14 (H) 2 - 12 mmol/L    Glucose 107 (H) 65 - 100 mg/dL    BUN 94 (H) 6 - 20 mg/dL    Creatinine 14.80 (H) 0.70 - 1.30 mg/dL    BUN/Creatinine Ratio 6 (L) 12 - 20      Est,  Glom Filt Rate 3 (L) >60 ml/min/1.73m2    Calcium 9.2 8.5 - 10.1 mg/dL    Total Bilirubin 0.4 0.2 - 1.0 mg/dL    AST 11 (L) 15 - 37 U/L    ALT 14 12 - 78 U/L    Alk Phosphatase 82 45 - 117 U/L    Total Protein 7.8 6.4 - 8.2 g/dL    Albumin 3.1 (L) 3.5 - 5.0 g/dL    Globulin 4.7 (H) 2.0 - 4.0 g/dL    Albumin/Globulin Ratio 0.7 (L) 1.1 - 2.2     Troponin    Collection Time: 10/19/24  9:23 AM   Result Value Ref Range    Troponin, High Sensitivity 7 0 - 76 ng/L       Radiologic Studies  XR CHEST PORTABLE   Final Result      No acute process on portable chest.         Electronically signed by TIFFANY AC        ------------------------------------------------------------------------------------------------------------  The evaluation and treatment you received in the Emergency Department were for an urgent problem. It is important that you follow-up with a doctor, nurse practitioner, or physician assistant to:  (1) confirm your diagnosis,  (2) re-evaluation of changes in your illness and treatment, and (3) for ongoing care. Please take your discharge instructions with you when you go to your follow-up appointment.     If you have any problem arranging a follow-up appointment, contact us!  If your symptoms become worse or you do not improve as expected, please return to us. We are available 24 hours a day.     If a prescription has been provided, please fill it as soon as possible to prevent a delay in treatment. If you have any questions or reservations about taking the medication due to side effects or interactions with other medications, please call your primary care provider or contact us directly.  Again, THANK YOU for choosing us to care for YOU!

## 2024-10-19 NOTE — ED TRIAGE NOTES
Patient here due to fluid overload. Patient is Tues, Thurs, and Sat, he has missed all this week due to \" I didn't feel like I could drive there\".

## 2024-10-19 NOTE — ED NOTES
Wound to bilateral shin area cleansed with NS, Miniplex 4x4 dressing placed to each, 2 abd pads on each and secured with Kerlix. Patient states feeling better after Zofran ODT. Helped patient out of bed at this time into chair, will feed patient and if he feels OK will discharge.

## 2025-07-22 ENCOUNTER — HOSPITAL ENCOUNTER (EMERGENCY)
Facility: HOSPITAL | Age: 70
Discharge: HOME | End: 2025-07-22
Payer: MEDICARE

## 2025-07-22 ENCOUNTER — APPOINTMENT (OUTPATIENT)
Facility: HOSPITAL | Age: 70
End: 2025-07-22
Payer: MEDICARE

## 2025-07-22 VITALS
WEIGHT: 285 LBS | HEART RATE: 83 BPM | RESPIRATION RATE: 18 BRPM | DIASTOLIC BLOOD PRESSURE: 78 MMHG | TEMPERATURE: 98.2 F | SYSTOLIC BLOOD PRESSURE: 187 MMHG | BODY MASS INDEX: 44.73 KG/M2 | HEIGHT: 67 IN | OXYGEN SATURATION: 100 %

## 2025-07-22 DIAGNOSIS — S01.81XA FACIAL LACERATION, INITIAL ENCOUNTER: ICD-10-CM
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
## 2025-07-22 DIAGNOSIS — S81.812A LACERATION OF LEFT LOWER EXTREMITY, INITIAL ENCOUNTER: ICD-10-CM

## 2025-07-22 PROCEDURE — 70450 CT HEAD/BRAIN W/O DYE: CPT

## 2025-07-22 PROCEDURE — 99284 EMERGENCY DEPT VISIT MOD MDM: CPT

## 2025-07-22 PROCEDURE — 90714 TD VACC NO PRESV 7 YRS+ IM: CPT | Performed by: NURSE PRACTITIONER

## 2025-07-22 PROCEDURE — 73610 X-RAY EXAM OF ANKLE: CPT

## 2025-07-22 PROCEDURE — 6360000002 HC RX W HCPCS: Performed by: NURSE PRACTITIONER

## 2025-07-22 PROCEDURE — 12011 RPR F/E/E/N/L/M 2.5 CM/<: CPT

## 2025-07-22 PROCEDURE — 90471 IMMUNIZATION ADMIN: CPT | Performed by: NURSE PRACTITIONER

## 2025-07-22 PROCEDURE — 12002 RPR S/N/AX/GEN/TRNK2.6-7.5CM: CPT

## 2025-07-22 RX ORDER — LIDOCAINE HYDROCHLORIDE 10 MG/ML
5 INJECTION, SOLUTION EPIDURAL; INFILTRATION; INTRACAUDAL; PERINEURAL ONCE
Status: COMPLETED | OUTPATIENT
Start: 2025-07-22 | End: 2025-07-22

## 2025-07-22 RX ADMIN — LIDOCAINE HYDROCHLORIDE 5 ML: 10 INJECTION, SOLUTION EPIDURAL; INFILTRATION; INTRACAUDAL; PERINEURAL at 08:22

## 2025-07-22 RX ADMIN — CLOSTRIDIUM TETANI TOXOID ANTIGEN (FORMALDEHYDE INACTIVATED) AND CORYNEBACTERIUM DIPHTHERIAE TOXOID ANTIGEN (FORMALDEHYDE INACTIVATED) 0.5 ML: 5; 2 INJECTION, SUSPENSION INTRAMUSCULAR at 08:21

## 2025-07-22 ASSESSMENT — LIFESTYLE VARIABLES
HOW OFTEN DO YOU HAVE A DRINK CONTAINING ALCOHOL: 2-4 TIMES A MONTH
HOW MANY STANDARD DRINKS CONTAINING ALCOHOL DO YOU HAVE ON A TYPICAL DAY: 3 OR 4

## 2025-07-22 ASSESSMENT — PAIN - FUNCTIONAL ASSESSMENT: PAIN_FUNCTIONAL_ASSESSMENT: 0-10

## 2025-07-22 ASSESSMENT — PAIN SCALES - GENERAL
PAINLEVEL_OUTOF10: 0
PAINLEVEL_OUTOF10: 0

## 2025-07-22 NOTE — ED PROVIDER NOTES
Open (no dressing)    Procedure completion:  Tolerated well, no immediate complications      SEPSIS REASSESSMENT & CRITICAL CARE TIME   SEPSIS REASSESSMENT: Patient does NOT meet Sepsis criteria after ED workup    Patient does not meet Critical Care Time, 0 minutes  CLINICAL IMPRESSIONS     1. Fall, initial encounter    2. Facial laceration, initial encounter    3. Laceration of left lower extremity, initial encounter       SDOH/DISPOSITION/PLAN   Social Determinants affecting Treatment Plan: None    DISPOSITION Decision To Discharge 07/22/2025 09:50:51 AM   DISPOSITION CONDITION Stable         Discharge Note: The patient is stable for discharge home. The signs, symptoms, diagnosis, and discharge instructions have been discussed, understanding conveyed, and agreed upon. The patient is to follow up as recommended or return to ER should their symptoms worsen.      PATIENT REFERRED TO:  Delmar Garcia DO  [id number] [facility] [address] [phone]          Delmar Garcia [place]  [id number] [facility] [address] [phone]          [facility] Emergency Department  [address] [place] [address]  [phone]    For suture removal        DISCHARGE MEDICATIONS:     Medication List        ASK your doctor about these medications      allopurinol 100 MG tablet  Commonly known as: ZYLOPRIM     amLODIPine 10 MG tablet  Commonly known as: NORVASC     atenolol 100 MG tablet  Commonly known as: TENORMIN     calcitRIOL 0.25 MCG capsule  Commonly known as: ROCALTROL     famotidine 20 MG tablet  Commonly known as: PEPCID     ferrous sulfate 325 (65 Fe) MG tablet  Commonly known as: IRON 325     furosemide 40 MG tablet  Commonly known as: LASIX     hydrALAZINE 50 MG tablet  Commonly known as: APRESOLINE     levothyroxine 50 MCG tablet  Commonly known as: SYNTHROID     sodium bicarbonate 650 MG tablet                DISCONTINUED MEDICATIONS:  Discharge Medication List as of

## 2025-07-22 NOTE — ED TRIAGE NOTES
Patient came in from [facility] due to a fall that happened around 3am prior to arrival to the treatment center. Dialysis wanted him sent over here because they did not want to do his treatment until he was medically cleared due to the heparin they use. Patient has no complaints upon arrival. Patient did hit head, no LOC, no thinners    Small lac noted to his left ankle

## 2025-07-22 NOTE — DISCHARGE INSTRUCTIONS
Thank you for choosing our Emergency Department for your care.  It is our privilege to care for you in your time of need.  In the next several days, you may receive a survey via email or mailed to your home about your experience with our team.  We would greatly appreciate you taking a few minutes to complete the survey, as we use this information to learn what we have done well and what we could be doing better. Thank you for trusting us with your care!    Below you will find a list of your tests from today's visit.   Labs and Radiology Studies  No results found for this or any previous visit (from the past 12 hours).  XR ANKLE LEFT (MIN 3 VIEWS)  Result Date: 7/22/2025  EXAM: XR ANKLE LEFT (MIN 3 VIEWS) INDICATION: fall lac. COMPARISON: None. FINDINGS: Three views of the left ankle demonstrate no fracture or disruption of the ankle mortise.  There is no other acute osseous or articular abnormality. Vascular calcifications.     No acute abnormality. Electronically signed by Mandeep Nettles    CT HEAD WO CONTRAST  Result Date: 7/22/2025  EXAM:  CT HEAD WO CONTRAST INDICATION:   fall COMPARISON: CT head 6/3/2023. TECHNIQUE: Unenhanced CT of the head was performed using 5 mm images. Brain and bone windows were generated.  CT dose reduction was achieved through use of a standardized protocol tailored for this examination and automatic exposure control for dose modulation. FINDINGS: The ventricles are normal in size and position. Unchanged cerebellar atrophy. Basilar cisterns are patent. No midline shift. There is no evidence of acute infarct, hemorrhage, or extraaxial fluid collection. The paranasal sinuses, mastoid air cells, and middle ears are clear. The orbital contents are within normal limits with right lens replacement. Mild left supraorbital soft tissue swelling.     1. No evidence of acute intracranial abnormality. Mild left supraorbital soft tissue swelling. Electronically signed by Óscar LANE

## 2025-08-13 ENCOUNTER — HOSPITAL ENCOUNTER (EMERGENCY)
Facility: HOSPITAL | Age: 70
Discharge: HOME | End: 2025-08-13
Payer: MEDICARE

## 2025-08-13 VITALS
RESPIRATION RATE: 18 BRPM | HEART RATE: 98 BPM | SYSTOLIC BLOOD PRESSURE: 181 MMHG | HEIGHT: 67 IN | WEIGHT: 270 LBS | BODY MASS INDEX: 42.38 KG/M2 | DIASTOLIC BLOOD PRESSURE: 78 MMHG | OXYGEN SATURATION: 95 % | TEMPERATURE: 97.5 F

## 2025-08-13 DIAGNOSIS — T81.30XA WOUND DEHISCENCE: ICD-10-CM

## 2025-08-13 DIAGNOSIS — Z48.02 VISIT FOR SUTURE REMOVAL: Primary | ICD-10-CM

## 2025-08-13 PROCEDURE — 87186 SC STD MICRODIL/AGAR DIL: CPT

## 2025-08-13 PROCEDURE — 87147 CULTURE TYPE IMMUNOLOGIC: CPT

## 2025-08-13 PROCEDURE — 99283 EMERGENCY DEPT VISIT LOW MDM: CPT

## 2025-08-13 PROCEDURE — 87205 SMEAR GRAM STAIN: CPT

## 2025-08-13 PROCEDURE — 87070 CULTURE OTHR SPECIMN AEROBIC: CPT

## 2025-08-13 PROCEDURE — 87077 CULTURE AEROBIC IDENTIFY: CPT

## 2025-08-13 RX ORDER — CLINDAMYCIN HYDROCHLORIDE 300 MG/1
300 CAPSULE ORAL 3 TIMES DAILY
Qty: 21 CAPSULE | Refills: 0 | Status: SHIPPED | OUTPATIENT
Start: 2025-08-13 | End: 2025-08-20

## 2025-08-13 ASSESSMENT — PAIN SCALES - GENERAL: PAINLEVEL_OUTOF10: 0

## 2025-08-16 LAB
BACTERIA SPEC CULT: ABNORMAL
GRAM STN SPEC: ABNORMAL
GRAM STN SPEC: ABNORMAL
Lab: ABNORMAL

## 2025-08-16 RX ORDER — CIPROFLOXACIN 500 MG/1
250 TABLET, FILM COATED ORAL 2 TIMES DAILY
Qty: 7 TABLET | Refills: 0 | Status: SHIPPED | OUTPATIENT
Start: 2025-08-16 | End: 2025-08-23

## 2025-09-06 ENCOUNTER — HOSPITAL ENCOUNTER (EMERGENCY)
Facility: HOSPITAL | Age: 70
Discharge: HOME | End: 2025-09-06
Attending: STUDENT IN AN ORGANIZED HEALTH CARE EDUCATION/TRAINING PROGRAM
Payer: MEDICARE

## 2025-09-06 VITALS
OXYGEN SATURATION: 98 % | HEART RATE: 93 BPM | DIASTOLIC BLOOD PRESSURE: 91 MMHG | TEMPERATURE: 97.3 F | RESPIRATION RATE: 16 BRPM | SYSTOLIC BLOOD PRESSURE: 148 MMHG

## 2025-09-06 DIAGNOSIS — Z99.2 ESRD NEEDING DIALYSIS (HCC): ICD-10-CM

## 2025-09-06 DIAGNOSIS — K59.1 FUNCTIONAL DIARRHEA: Primary | ICD-10-CM

## 2025-09-06 DIAGNOSIS — N18.6 ESRD NEEDING DIALYSIS (HCC): ICD-10-CM

## 2025-09-06 LAB
ALBUMIN SERPL-MCNC: 3.1 G/DL (ref 3.5–5)
ALBUMIN/GLOB SERPL: 0.8 (ref 1.1–2.2)
ALP SERPL-CCNC: 86 U/L (ref 45–117)
ALT SERPL-CCNC: 18 U/L (ref 12–78)
ANION GAP SERPL CALC-SCNC: 7 MMOL/L (ref 2–12)
AST SERPL W P-5'-P-CCNC: 10 U/L (ref 15–37)
BASOPHILS # BLD: 0.03 K/UL (ref 0–0.1)
BASOPHILS NFR BLD: 0.7 % (ref 0–1)
BILIRUB SERPL-MCNC: 0.6 MG/DL (ref 0.2–1)
BUN SERPL-MCNC: 41 MG/DL (ref 6–20)
BUN/CREAT SERPL: 5 (ref 12–20)
CA-I BLD-MCNC: 9.2 MG/DL (ref 8.5–10.1)
CHLORIDE SERPL-SCNC: 100 MMOL/L (ref 97–108)
CO2 SERPL-SCNC: 27 MMOL/L (ref 21–32)
CREAT SERPL-MCNC: 8.78 MG/DL (ref 0.7–1.3)
DIFFERENTIAL METHOD BLD: ABNORMAL
EOSINOPHIL # BLD: 0.14 K/UL (ref 0–0.4)
EOSINOPHIL NFR BLD: 3.1 % (ref 0–7)
ERYTHROCYTE [DISTWIDTH] IN BLOOD BY AUTOMATED COUNT: 15.7 % (ref 11.5–14.5)
GLOBULIN SER CALC-MCNC: 3.7 G/DL (ref 2–4)
GLUCOSE SERPL-MCNC: 95 MG/DL (ref 65–100)
HCT VFR BLD AUTO: 30.2 % (ref 36.6–50.3)
HGB BLD-MCNC: 9.7 G/DL (ref 12.1–17)
IMM GRANULOCYTES # BLD AUTO: 0.02 K/UL (ref 0–0.04)
IMM GRANULOCYTES NFR BLD AUTO: 0.4 % (ref 0–0.5)
LYMPHOCYTES # BLD: 0.61 K/UL (ref 0.8–3.5)
LYMPHOCYTES NFR BLD: 13.5 % (ref 12–49)
MAGNESIUM SERPL-MCNC: 2.4 MG/DL (ref 1.6–2.4)
MCH RBC QN AUTO: 29.5 PG (ref 26–34)
MCHC RBC AUTO-ENTMCNC: 32.1 G/DL (ref 30–36.5)
MCV RBC AUTO: 91.8 FL (ref 80–99)
MONOCYTES # BLD: 0.8 K/UL (ref 0–1)
MONOCYTES NFR BLD: 17.7 % (ref 5–13)
NEUTS SEG # BLD: 2.91 K/UL (ref 1.8–8)
NEUTS SEG NFR BLD: 64.6 % (ref 32–75)
NRBC # BLD: 0 K/UL (ref 0–0.01)
NRBC BLD-RTO: 0 PER 100 WBC
PLATELET # BLD AUTO: 163 K/UL (ref 150–400)
PMV BLD AUTO: 9.6 FL (ref 8.9–12.9)
POTASSIUM SERPL-SCNC: 4 MMOL/L (ref 3.5–5.1)
PROT SERPL-MCNC: 6.8 G/DL (ref 6.4–8.2)
RBC # BLD AUTO: 3.29 M/UL (ref 4.1–5.7)
SODIUM SERPL-SCNC: 134 MMOL/L (ref 136–145)
WBC # BLD AUTO: 4.5 K/UL (ref 4.1–11.1)

## 2025-09-06 PROCEDURE — 83735 ASSAY OF MAGNESIUM: CPT

## 2025-09-06 PROCEDURE — 2709999900 HC NON-CHARGEABLE SUPPLY

## 2025-09-06 PROCEDURE — 6370000000 HC RX 637 (ALT 250 FOR IP): Performed by: EMERGENCY MEDICINE

## 2025-09-06 PROCEDURE — 36415 COLL VENOUS BLD VENIPUNCTURE: CPT

## 2025-09-06 PROCEDURE — 80053 COMPREHEN METABOLIC PANEL: CPT

## 2025-09-06 PROCEDURE — G0257 UNSCHED DIALYSIS ESRD PT HOS: HCPCS

## 2025-09-06 PROCEDURE — 85025 COMPLETE CBC W/AUTO DIFF WBC: CPT

## 2025-09-06 RX ORDER — DIPHENOXYLATE HYDROCHLORIDE AND ATROPINE SULFATE 2.5; .025 MG/1; MG/1
1 TABLET ORAL 2 TIMES DAILY PRN
Qty: 10 TABLET | Refills: 0 | Status: SHIPPED | OUTPATIENT
Start: 2025-09-06 | End: 2025-09-16

## 2025-09-06 RX ORDER — DIPHENOXYLATE HYDROCHLORIDE AND ATROPINE SULFATE 2.5; .025 MG/1; MG/1
1 TABLET ORAL 4 TIMES DAILY PRN
Qty: 20 TABLET | Refills: 0 | Status: SHIPPED | OUTPATIENT
Start: 2025-09-06 | End: 2025-09-16

## 2025-09-06 RX ORDER — DIPHENOXYLATE HYDROCHLORIDE AND ATROPINE SULFATE 2.5; .025 MG/1; MG/1
1 TABLET ORAL 4 TIMES DAILY PRN
Status: DISCONTINUED | OUTPATIENT
Start: 2025-09-06 | End: 2025-09-06 | Stop reason: HOSPADM

## 2025-09-06 RX ADMIN — DIPHENOXYLATE HYDROCHLORIDE AND ATROPINE SULFATE 1 TABLET: 2.5; .025 TABLET ORAL at 18:39

## 2025-09-06 ASSESSMENT — PAIN SCALES - GENERAL
PAINLEVEL_OUTOF10: 0

## 2025-09-06 ASSESSMENT — PAIN - FUNCTIONAL ASSESSMENT: PAIN_FUNCTIONAL_ASSESSMENT: 0-10

## (undated) DEVICE — SOLUTION IV 500ML 0.9% SOD CHL PH 5 INJ USP VIAFLX PLAS

## (undated) DEVICE — SUTURE PROL SZ 6-0 L24IN NONABSORBABLE BLU L9.3MM BV-1 3/8 8805H

## (undated) DEVICE — DEVICE INFL ENCORE MEDI 26

## (undated) DEVICE — 450 ML BOTTLE OF 0.05% CHLORHEXIDINE GLUCONATE IN 99.95% STERILE WATER FOR IRRIGATION, USP AND APPLICATOR.: Brand: IRRISEPT ANTIMICROBIAL WOUND LAVAGE

## (undated) DEVICE — RADIFOCUS GLIDEWIRE: Brand: GLIDEWIRE

## (undated) DEVICE — AV FISTULA - MRMC: Brand: MEDLINE INDUSTRIES, INC.

## (undated) DEVICE — GLOVE SURG SZ 75 CRM LTX FREE POLYISOPRENE POLYMER BEAD ANTI

## (undated) DEVICE — AGENT HEMSTAT W4XL4IN OXIDIZED REGENERATED CELOS ABSRB SFT

## (undated) DEVICE — SUTURE VCRL SZ 2-0 L36IN ABSRB UD L36MM CT-1 1/2 CIR J945H

## (undated) DEVICE — OPTIFOAM GENTLE SA, POSTOP, 4X8: Brand: MEDLINE

## (undated) DEVICE — INTRODUCER SHTH PED DIA5FR CANN L5.5CM 0.035IN PERIPH W/

## (undated) DEVICE — SUTURE PROL SZ 6-0 L24IN NONABSORBABLE BLU L13MM C-1 3/8 8726H

## (undated) DEVICE — SUTURE NONABSORBABLE MONOFILAMENT 5-0 C-1 1X24 IN PROLENE 8725H

## (undated) DEVICE — PTA BALLOON DILATATION CATHETER: Brand: MUSTANG™

## (undated) DEVICE — BLADE,CARBON-STEEL,15,STRL,DISPOSABLE,TB: Brand: MEDLINE

## (undated) DEVICE — Device: Brand: JELCO

## (undated) DEVICE — PROBE VASC 8MHZ WTRPRF

## (undated) DEVICE — SOLUTION IRRIG 1000ML 0.9% SOD CHL USP POUR PLAS BTL

## (undated) DEVICE — SUTURE VCRL SZ 3-0 L27IN ABSRB UD L26MM SH 1/2 CIR J416H

## (undated) DEVICE — ECHOTIP SKINNY NEEDLE WITH CHIBA TIP: Brand: ECHOTIP

## (undated) DEVICE — LIQUIBAND RAPID ADHESIVE 36/CS 0.8ML: Brand: MEDLINE

## (undated) DEVICE — BLADE CLIPPER GEN PURP NS

## (undated) DEVICE — LOOP VES W13MM THK09MM MINI RED SIL FLD REPELLENT

## (undated) DEVICE — SUTURE SZ 0 27IN 5/8 CIR UR-6  TAPER PT VIOLET ABSRB VICRYL J603H